# Patient Record
Sex: FEMALE | Race: WHITE | Employment: UNEMPLOYED | URBAN - NONMETROPOLITAN AREA
[De-identification: names, ages, dates, MRNs, and addresses within clinical notes are randomized per-mention and may not be internally consistent; named-entity substitution may affect disease eponyms.]

---

## 2019-01-08 ENCOUNTER — HOSPITAL ENCOUNTER (OUTPATIENT)
Age: 59
Discharge: HOME | End: 2019-01-08
Payer: MEDICARE

## 2019-01-08 LAB
ALBUMIN SERPL-MCNC: 4.2 G/DL (ref 3.5–5.1)
ALP BLD-CCNC: 75 U/L (ref 38–126)
ALT SERPL-CCNC: 24 U/L (ref 11–66)
ANION GAP SERPL CALCULATED.3IONS-SCNC: 12 MEQ/L (ref 8–16)
AST SERPL-CCNC: 37 U/L (ref 5–40)
BASOPHILS # BLD: 0.3 %
BASOPHILS ABSOLUTE: 0 THOU/MM3 (ref 0–0.1)
BILIRUB SERPL-MCNC: 0.4 MG/DL (ref 0.3–1.2)
BUN BLDV-MCNC: 11 MG/DL (ref 7–22)
CALCIUM SERPL-MCNC: 10.2 MG/DL (ref 8.5–10.5)
CHLORIDE BLD-SCNC: 99 MEQ/L (ref 98–111)
CHOLESTEROL, TOTAL: 200 MG/DL (ref 100–199)
CO2: 27 MEQ/L (ref 23–33)
CREAT SERPL-MCNC: 0.8 MG/DL (ref 0.4–1.2)
CREATININE, URINE: 233.2 MG/DL
EOSINOPHIL # BLD: 1.3 %
EOSINOPHILS ABSOLUTE: 0.1 THOU/MM3 (ref 0–0.4)
ERYTHROCYTE [DISTWIDTH] IN BLOOD BY AUTOMATED COUNT: 14.1 % (ref 11.5–14.5)
ERYTHROCYTE [DISTWIDTH] IN BLOOD BY AUTOMATED COUNT: 47.2 FL (ref 35–45)
GFR SERPL CREATININE-BSD FRML MDRD: 74 ML/MIN/1.73M2
GLUCOSE BLD-MCNC: 129 MG/DL (ref 70–108)
HCT VFR BLD CALC: 44.1 % (ref 37–47)
HDLC SERPL-MCNC: 34 MG/DL
HEMOGLOBIN: 13.7 GM/DL (ref 12–16)
IMMATURE GRANS (ABS): 0.07 THOU/MM3 (ref 0–0.07)
IMMATURE GRANULOCYTES: 0.7 %
LDL CHOLESTEROL CALCULATED: 109 MG/DL
LYMPHOCYTES # BLD: 30.8 %
LYMPHOCYTES ABSOLUTE: 3 THOU/MM3 (ref 1–4.8)
MCH RBC QN AUTO: 28.6 PG (ref 26–33)
MCHC RBC AUTO-ENTMCNC: 31.1 GM/DL (ref 32.2–35.5)
MCV RBC AUTO: 92.1 FL (ref 81–99)
MICROALBUMIN UR-MCNC: 54.1 MG/DL
MICROALBUMIN/CREAT UR-RTO: 232 MG/G (ref 0–30)
MONOCYTES # BLD: 4.7 %
MONOCYTES ABSOLUTE: 0.5 THOU/MM3 (ref 0.4–1.3)
NUCLEATED RED BLOOD CELLS: 0 /100 WBC
PLATELET # BLD: 336 THOU/MM3 (ref 130–400)
PMV BLD AUTO: 10.4 FL (ref 9.4–12.4)
POTASSIUM SERPL-SCNC: 4.8 MEQ/L (ref 3.5–5.2)
RBC # BLD: 4.79 MILL/MM3 (ref 4.2–5.4)
SEG NEUTROPHILS: 62.2 %
SEGMENTED NEUTROPHILS ABSOLUTE COUNT: 6.2 THOU/MM3 (ref 1.8–7.7)
SODIUM BLD-SCNC: 138 MEQ/L (ref 135–145)
TOTAL PROTEIN: 7.3 G/DL (ref 6.1–8)
TRIGL SERPL-MCNC: 287 MG/DL (ref 0–199)
TSH SERPL DL<=0.05 MIU/L-ACNC: 2.65 UIU/ML (ref 0.4–4.2)
WBC # BLD: 9.9 THOU/MM3 (ref 4.8–10.8)

## 2019-01-08 PROCEDURE — 85025 COMPLETE CBC W/AUTO DIFF WBC: CPT

## 2019-01-08 PROCEDURE — 36415 COLL VENOUS BLD VENIPUNCTURE: CPT

## 2019-01-08 PROCEDURE — 80061 LIPID PANEL: CPT

## 2019-01-08 PROCEDURE — 84443 ASSAY THYROID STIM HORMONE: CPT

## 2019-01-08 PROCEDURE — 80053 COMPREHEN METABOLIC PANEL: CPT

## 2019-01-08 PROCEDURE — 82043 UR ALBUMIN QUANTITATIVE: CPT

## 2019-08-02 ENCOUNTER — TELEPHONE (OUTPATIENT)
Dept: FAMILY MEDICINE CLINIC | Age: 59
End: 2019-08-02

## 2019-08-07 ENCOUNTER — TELEPHONE (OUTPATIENT)
Dept: FAMILY MEDICINE CLINIC | Age: 59
End: 2019-08-07

## 2019-08-07 NOTE — TELEPHONE ENCOUNTER
1. Appt time and date. (give directions)   Future Appointments   Date Time Provider Demi Farley   8/27/2019  2:20 PM Curtis Antoine, [address]         2. Arrive 15 min before appt. 3. Please bring all medications to appt. 4. Bring immunization record. (if no record, which immunizations have you had and where?)          Left detailed message on mach.

## 2019-08-25 NOTE — PROGRESS NOTES
due to type 2 diabetes mellitus ([facility]     Nicotine dependence     Umbilical hernia      as a complication  after cholecystectomy per pt      Urge urinary incontinence     Hypertension, essential        Past Medical History:   Diagnosis Date    Asthma, mild persistent     Depression with anxiety     DM2 (diabetes mellitus, type 2) (HCC)     GERD (gastroesophageal reflux disease)     Glaucoma     History of CVA (cerebrovascular accident)     x 2 per pt; last was ~2010    Hypertension, essential     Microalbuminuria due to type 2 diabetes mellitus ([facility]     Nicotine dependence     Umbilical hernia     as a complication  after cholecystectomy per pt    Urge urinary incontinence          Past Surgical History:   Procedure Laterality Date    CATARACT REMOVAL WITH IMPLANT Bilateral     CHOLECYSTECTOMY      COLONOSCOPY  2014, 2016    FOOT SURGERY Right     10/17/13, hardware placed and removed    TONSILLECTOMY      UPPER GASTROINTESTINAL ENDOSCOPY  2014, 2016         Allergies   Allergen Reactions    Latex          Social History     Tobacco Use    Smoking status: Current Every Day Smoker     Packs/day: 1.00     Years: 33.00     Pack years: 33.00    Smokeless tobacco: Never Used   Substance Use Topics    Alcohol use: No         Family History   Problem Relation Age of Onset    Diabetes Mother     Glaucoma Mother     Heart Attack Father     Diabetes Sister     Diabetes Brother     Colon Polyps Brother 52    Colon Polyps Sister 54    Colon Cancer Neg Hx     Breast Cancer Neg Hx          I have reviewed the patient's past medical history, past surgical history, allergies, medications, social and family history and I have made updates where appropriate.       Review of Systems  Positive responses are highlighted in bold    Constitutional:  Fever, Chills, Night Sweats, Fatigue, Unexpected changes in weight  Eyes:  Eye discharge, Eye pain, Eye redness, Visual disturbances   HENT:  Ear pain, Tinnitus, Nosebleeds, Trouble swallowing, Hearing loss, Sore throat  Cardiovascular:  Chest Pain, Palpitations, Orthopnea, Paroxysmal Nocturnal Dyspnea  Respiratory:  Cough, Wheezing, Shortness of breath, Chest tightness, Apnea  Gastrointestinal:  Nausea, Vomiting, Diarrhea, Constipation, Heartburn, Blood in stool  Genitourinary:  Difficulty or painful urination, Flank pain, Change in frequency, Urgency  Skin:  Color change, Rash, Itching, Wound  Psychiatric:  Hallucinations, Anxiety, Depression, Suicidal ideation  Hematological:  Enlarged glands, Easy bleeding, Easily bruising  Musculoskeletal:  Joint pain, Back pain, Gait problems, Joint swelling, Myalgias  Neurological:  Dizziness, Headaches, Presyncope, Numbness, Seizures, Tremors  Allergy:  Environmental allergies, Food allergies  Endocrine:  Heat Intolerance, Cold Intolerance, Polydipsia, Polyphagia, Polyuria      PHYSICAL EXAM:  Vitals:    08/27/19 1423   BP: 120/68   Pulse: 80   Resp: 20   Temp: 98.1 °F (36.7 °C)   TempSrc: Oral   Weight: 230 lb (104.3 kg)   Height: 5' 6\" (1.676 m)     Body mass index is 37.12 kg/m². Pain Score:   0 - No pain    VS Reviewed  General Appearance: A&O x 3, No acute distress,well developed and well- nourished  Head: normocephalic and atraumatic  Eyes: pupils equal, round, and reactive to light, extraocular eye movements intact, conjunctivae and eye lids without erythema  ENT: external ear and ear canal clear bilaterally but skin a bit dry, TMs intact and regular, nose without deformity, nasal mucosa and turbinates normal without polyps, oropharynx normal, dentition is normal for age  Neck: supple and non-tender without mass, no thyromegaly or thyroid nodules, no cervical lymphadenopathy  Pulmonary/Chest: clear to auscultation bilaterally- no wheezes, rales or rhonchi, normal air movement, no respiratory distress or retractions  Cardiovascular: S1 and S2 auscultated w/ RRR.  No murmurs, rubs, clicks, or gallops, distal pulses Attached    I have instructed Say Jeff to complete a self tracking handout on Blood Pressures  and Weights and instructed them to bring it with them to her next appointment. Barriers to learning and self management: none    Discussed use, benefit, and side effects of prescribed medications. Barriers to medication compliance addressed. All patient questions answered. Pt voiced understanding. Low Dose CT (LDCT) Lung Screening criteria met   Age 50-69   Pack year smoking >30   Still smoking or less than 15 year since quit   No sign or symptoms of lung cancer   > 11 months since last LDCT     Risks and benefits of lung cancer screening with LDCT scans discussed:    Significance of positive screen - False-positive LDCT results often occur. 95% of all positive results do not lead to a diagnosis of cancer. Usually further imaging can resolve most false-positive results; however, some patients may require invasive procedures. Over diagnosis risk - 10% to 12% of screen-detected lung cancer cases are over diagnosed--that is, the cancer would not have been detected in the patient's lifetime without the screening. Need for follow up screens annually to continue lung cancer screening effectiveness     Risks associated with radiation from annual LDCT- Radiation exposure is about the same as for a mammogram, which is about 1/3 of the annual background radiation exposure from everyday life. Starting screening at age 54 is not likely to increase cancer risk from radiation exposure. Patients with comorbidities resulting in life expectancy of < 10 years, or that would preclude treatment of an abnormality identified on CT, should not be screened due to lack of benefit.     To obtain maximal benefit from this screening, smoking cessation and long-term abstinence from smoking is critical      Electronically signed by Paloma Gusman DO on 8/27/2019 at 3:24 PM

## 2019-08-27 ENCOUNTER — TELEPHONE (OUTPATIENT)
Dept: FAMILY MEDICINE CLINIC | Age: 59
End: 2019-08-27

## 2019-08-27 ENCOUNTER — OFFICE VISIT (OUTPATIENT)
Dept: FAMILY MEDICINE CLINIC | Age: 59
End: 2019-08-27
Payer: MEDICARE

## 2019-08-27 VITALS
BODY MASS INDEX: 36.96 KG/M2 | WEIGHT: 230 LBS | HEART RATE: 80 BPM | TEMPERATURE: 98.1 F | HEIGHT: 66 IN | RESPIRATION RATE: 20 BRPM | DIASTOLIC BLOOD PRESSURE: 68 MMHG | SYSTOLIC BLOOD PRESSURE: 120 MMHG

## 2019-08-27 DIAGNOSIS — E11.29 TYPE 2 DIABETES MELLITUS WITH MICROALBUMINURIA, WITHOUT LONG-TERM CURRENT USE OF INSULIN (HCC): Primary | ICD-10-CM

## 2019-08-27 DIAGNOSIS — R80.9 TYPE 2 DIABETES MELLITUS WITH MICROALBUMINURIA, WITHOUT LONG-TERM CURRENT USE OF INSULIN (HCC): Primary | ICD-10-CM

## 2019-08-27 DIAGNOSIS — N39.41 URGE URINARY INCONTINENCE: ICD-10-CM

## 2019-08-27 DIAGNOSIS — F41.8 DEPRESSION WITH ANXIETY: ICD-10-CM

## 2019-08-27 DIAGNOSIS — E55.9 VITAMIN D DEFICIENCY: ICD-10-CM

## 2019-08-27 DIAGNOSIS — L29.9 ITCHING OF EAR: ICD-10-CM

## 2019-08-27 DIAGNOSIS — J45.30 MILD PERSISTENT ASTHMA WITHOUT COMPLICATION: ICD-10-CM

## 2019-08-27 DIAGNOSIS — F17.210 CIGARETTE NICOTINE DEPENDENCE WITHOUT COMPLICATION: ICD-10-CM

## 2019-08-27 DIAGNOSIS — K21.00 GASTROESOPHAGEAL REFLUX DISEASE WITH ESOPHAGITIS: ICD-10-CM

## 2019-08-27 DIAGNOSIS — Z12.31 ENCOUNTER FOR SCREENING MAMMOGRAM FOR MALIGNANT NEOPLASM OF BREAST: ICD-10-CM

## 2019-08-27 DIAGNOSIS — I10 HYPERTENSION, ESSENTIAL: ICD-10-CM

## 2019-08-27 DIAGNOSIS — Z86.73 HISTORY OF CVA (CEREBROVASCULAR ACCIDENT): ICD-10-CM

## 2019-08-27 LAB — HBA1C MFR BLD: 6.8 %

## 2019-08-27 PROCEDURE — 83036 HEMOGLOBIN GLYCOSYLATED A1C: CPT | Performed by: FAMILY MEDICINE

## 2019-08-27 PROCEDURE — G8417 CALC BMI ABV UP PARAM F/U: HCPCS | Performed by: FAMILY MEDICINE

## 2019-08-27 PROCEDURE — G8427 DOCREV CUR MEDS BY ELIG CLIN: HCPCS | Performed by: FAMILY MEDICINE

## 2019-08-27 PROCEDURE — 3044F HG A1C LEVEL LT 7.0%: CPT | Performed by: FAMILY MEDICINE

## 2019-08-27 PROCEDURE — G0296 VISIT TO DETERM LDCT ELIG: HCPCS | Performed by: FAMILY MEDICINE

## 2019-08-27 PROCEDURE — 4004F PT TOBACCO SCREEN RCVD TLK: CPT | Performed by: FAMILY MEDICINE

## 2019-08-27 PROCEDURE — 3017F COLORECTAL CA SCREEN DOC REV: CPT | Performed by: FAMILY MEDICINE

## 2019-08-27 PROCEDURE — 2022F DILAT RTA XM EVC RTNOPTHY: CPT | Performed by: FAMILY MEDICINE

## 2019-08-27 PROCEDURE — 99204 OFFICE O/P NEW MOD 45 MIN: CPT | Performed by: FAMILY MEDICINE

## 2019-08-27 RX ORDER — ALBUTEROL SULFATE 90 UG/1
2 AEROSOL, METERED RESPIRATORY (INHALATION) EVERY 6 HOURS PRN
COMMUNITY
End: 2021-10-25

## 2019-08-27 RX ORDER — ALBUTEROL SULFATE 90 UG/1
2 AEROSOL, METERED RESPIRATORY (INHALATION) EVERY 4 HOURS PRN
Qty: 2 INHALER | Refills: 3 | Status: SHIPPED | OUTPATIENT
Start: 2019-08-27 | End: 2021-06-02 | Stop reason: SDUPTHER

## 2019-08-27 RX ORDER — ATENOLOL 25 MG/1
25 TABLET ORAL DAILY
Qty: 90 TABLET | Refills: 3 | Status: SHIPPED | OUTPATIENT
Start: 2019-08-27 | End: 2020-08-27

## 2019-08-27 RX ORDER — BUSPIRONE HYDROCHLORIDE 10 MG/1
10 TABLET ORAL
COMMUNITY
End: 2020-03-23 | Stop reason: SDUPTHER

## 2019-08-27 RX ORDER — AMLODIPINE BESYLATE AND BENAZEPRIL HYDROCHLORIDE 10; 20 MG/1; MG/1
1 CAPSULE ORAL DAILY
Qty: 90 CAPSULE | Refills: 3 | Status: SHIPPED | OUTPATIENT
Start: 2019-08-27 | End: 2020-05-29

## 2019-08-27 RX ORDER — ATENOLOL 25 MG/1
25 TABLET ORAL DAILY
COMMUNITY
End: 2019-08-27 | Stop reason: SDUPTHER

## 2019-08-27 ASSESSMENT — PATIENT HEALTH QUESTIONNAIRE - PHQ9
SUM OF ALL RESPONSES TO PHQ9 QUESTIONS 1 & 2: 2
1. LITTLE INTEREST OR PLEASURE IN DOING THINGS: 1
2. FEELING DOWN, DEPRESSED OR HOPELESS: 1
SUM OF ALL RESPONSES TO PHQ QUESTIONS 1-9: 2
SUM OF ALL RESPONSES TO PHQ QUESTIONS 1-9: 2

## 2019-08-27 NOTE — PATIENT INSTRUCTIONS
Medicare and most other insurers, strict criteria must be met. If you do not meet these criteria, but still wish to undergo LDCT testing, you will be required to sign a waiver indicating your willingness to pay for the scan. Patient Education        Type 2 Diabetes: Care Instructions  Your Care Instructions    Type 2 diabetes is a disease that develops when the body's tissues cannot use insulin properly. Over time, the pancreas cannot make enough insulin. Insulin is a hormone that helps the body's cells use sugar (glucose) for energy. It also helps the body store extra sugar in muscle, fat, and liver cells. Without insulin, the sugar cannot get into the cells to do its work. It stays in the blood instead. This can cause high blood sugar levels. A person has diabetes when the blood sugar stays too high too much of the time. Over time, diabetes can lead to diseases of the heart, blood vessels, nerves, kidneys, and eyes. You may be able to control your blood sugar by losing weight, eating a healthy diet, and getting daily exercise. You may also have to take insulin or other diabetes medicine. Follow-up care is a key part of your treatment and safety. Be sure to make and go to all appointments. Call your doctor if you are having problems. It's also a good idea to know your test results and keep a list of the medicines you take. How can you care for yourself at home? · Keep your blood sugar at a target level (which you set with your doctor). ? Eat a good diet that spreads carbohydrate throughout the day. Carbohydrate--the body's main source of fuel--affects blood sugar more than any other nutrient. Carbohydrate is in fruits, vegetables, milk, and yogurt. It also is in breads, cereals, vegetables such as potatoes and corn, and sugary foods such as candy and cakes. ? Aim for 30 minutes of exercise on most, preferably all, days of the week. Walking is a good choice.  You also may want to do other activities, such as running, swimming, cycling, or playing tennis or team sports. If your doctor says it's okay, do muscle-strengthening exercises at least 2 times a week. ? Take your medicines exactly as prescribed. Call your doctor if you think you are having a problem with your medicine. You will get more details on the specific medicines your doctor prescribes. · Check your blood sugar as often as your doctor recommends. It is important to keep track of any symptoms you have, such as low blood sugar. Also tell your doctor if you have any changes in your activities, diet, or insulin use. · Talk to your doctor before you start taking aspirin every day. Aspirin can help certain people lower their risk of a heart attack or stroke. But taking aspirin isn't right for everyone, because it can cause serious bleeding. · Do not smoke. If you need help quitting, talk to your doctor about stop-smoking programs and medicines. These can increase your chances of quitting for good. · Keep your cholesterol and blood pressure at normal levels. You may need to take one or more medicines to reach your goals. Take them exactly as directed. Do not stop or change a medicine without talking to your doctor first.  When should you call for help? Call 911 anytime you think you may need emergency care. For example, call if:    · You passed out (lost consciousness), or you suddenly become very sleepy or confused. (You may have very low blood sugar.)    Call your doctor now or seek immediate medical care if:    · Your blood sugar is 300 mg/dL or is higher than the level your doctor has set for you.     · You have symptoms of low blood sugar, such as:  ? Sweating. ? Feeling nervous, shaky, and weak. ? Extreme hunger and slight nausea. ? Dizziness and headache.  ? Blurred vision. ?  Confusion.    Watch closely for changes in your health, and be sure to contact your doctor if:    · You often have problems controlling your blood sugar.     · You have

## 2019-09-12 ENCOUNTER — HOSPITAL ENCOUNTER (OUTPATIENT)
Dept: PULMONOLOGY | Age: 59
Discharge: HOME | End: 2019-09-12
Payer: MEDICARE

## 2019-09-12 DIAGNOSIS — J45.30 MILD PERSISTENT ASTHMA WITHOUT COMPLICATION: ICD-10-CM

## 2019-09-12 PROCEDURE — 94060 EVALUATION OF WHEEZING: CPT

## 2019-09-12 PROCEDURE — 94726 PLETHYSMOGRAPHY LUNG VOLUMES: CPT

## 2019-09-12 PROCEDURE — 94729 DIFFUSING CAPACITY: CPT

## 2019-09-18 ENCOUNTER — TELEPHONE (OUTPATIENT)
Dept: FAMILY MEDICINE CLINIC | Age: 59
End: 2019-09-18

## 2019-09-18 NOTE — TELEPHONE ENCOUNTER
Patient has been informed and understands but would like to know what she can take for sneezing   Please advise

## 2019-10-07 ENCOUNTER — TELEPHONE (OUTPATIENT)
Dept: FAMILY MEDICINE CLINIC | Age: 59
End: 2019-10-07

## 2019-10-13 ENCOUNTER — APPOINTMENT (OUTPATIENT)
Dept: CT IMAGING | Age: 59
End: 2019-10-13
Payer: MEDICARE

## 2019-10-13 ENCOUNTER — HOSPITAL ENCOUNTER (EMERGENCY)
Age: 59
Discharge: HOME | End: 2019-10-14
Attending: EMERGENCY MEDICINE
Payer: MEDICARE

## 2019-10-13 ENCOUNTER — APPOINTMENT (OUTPATIENT)
Dept: GENERAL RADIOLOGY | Age: 59
End: 2019-10-13
Payer: MEDICARE

## 2019-10-13 VITALS
SYSTOLIC BLOOD PRESSURE: 167 MMHG | BODY MASS INDEX: 37.12 KG/M2 | TEMPERATURE: 98 F | HEIGHT: 66 IN | WEIGHT: 231 LBS | OXYGEN SATURATION: 96 % | HEART RATE: 92 BPM | RESPIRATION RATE: 18 BRPM | DIASTOLIC BLOOD PRESSURE: 89 MMHG

## 2019-10-13 DIAGNOSIS — J45.901 MILD ASTHMA WITH ACUTE EXACERBATION, UNSPECIFIED WHETHER PERSISTENT: ICD-10-CM

## 2019-10-13 DIAGNOSIS — S09.90XA CLOSED HEAD INJURY, INITIAL ENCOUNTER: ICD-10-CM
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
## 2019-10-13 LAB
ALBUMIN SERPL-MCNC: 4 G/DL (ref 3.5–5.1)
ALP BLD-CCNC: 69 U/L (ref 38–126)
ALT SERPL-CCNC: 12 U/L (ref 11–66)
AMPHETAMINE+METHAMPHETAMINE URINE SCREEN: NEGATIVE
ANION GAP SERPL CALCULATED.3IONS-SCNC: 13 MEQ/L (ref 8–16)
AST SERPL-CCNC: 18 U/L (ref 5–40)
BACTERIA: ABNORMAL /HPF
BARBITURATE QUANTITATIVE URINE: NEGATIVE
BASOPHILS # BLD: 0.3 %
BASOPHILS ABSOLUTE: 0 THOU/MM3 (ref 0–0.1)
BENZODIAZEPINE QUANTITATIVE URINE: NEGATIVE
BILIRUB SERPL-MCNC: 0.2 MG/DL (ref 0.3–1.2)
BILIRUBIN URINE: ABNORMAL
BLOOD, URINE: NEGATIVE
BUN BLDV-MCNC: 14 MG/DL (ref 7–22)
CALCIUM SERPL-MCNC: 9.9 MG/DL (ref 8.5–10.5)
CANNABINOID QUANTITATIVE URINE: NEGATIVE
CASTS 2: ABNORMAL /LPF
CASTS UA: ABNORMAL /LPF
CHARACTER, URINE: ABNORMAL
CHLORIDE BLD-SCNC: 97 MEQ/L (ref 98–111)
CO2: 27 MEQ/L (ref 23–33)
COCAINE METABOLITE QUANTITATIVE URINE: NEGATIVE
COLOR: YELLOW
CREAT SERPL-MCNC: 0.7 MG/DL (ref 0.4–1.2)
CRYSTALS, UA: ABNORMAL
EKG ATRIAL RATE: 86 BPM
EKG P AXIS: 60 DEGREES
EKG P-R INTERVAL: 176 MS
EKG Q-T INTERVAL: 386 MS
EKG QRS DURATION: 78 MS
EKG QTC CALCULATION (BAZETT): 461 MS
EKG R AXIS: 55 DEGREES
EKG T AXIS: 81 DEGREES
EKG VENTRICULAR RATE: 86 BPM
EOSINOPHIL # BLD: 1.4 %
EOSINOPHILS ABSOLUTE: 0.1 THOU/MM3 (ref 0–0.4)
EPITHELIAL CELLS, UA: ABNORMAL /HPF
ERYTHROCYTE [DISTWIDTH] IN BLOOD BY AUTOMATED COUNT: 14.4 % (ref 11.5–14.5)
ERYTHROCYTE [DISTWIDTH] IN BLOOD BY AUTOMATED COUNT: 46.9 FL (ref 35–45)
ETHYL ALCOHOL, SERUM: < 0.01 %
GFR SERPL CREATININE-BSD FRML MDRD: 86 ML/MIN/1.73M2
GLUCOSE BLD-MCNC: 232 MG/DL (ref 70–108)
GLUCOSE URINE: NEGATIVE MG/DL
HCT VFR BLD CALC: 40.3 % (ref 37–47)
HEMOGLOBIN: 12.9 GM/DL (ref 12–16)
ICTOTEST: NEGATIVE
IMMATURE GRANS (ABS): 0.04 THOU/MM3 (ref 0–0.07)
IMMATURE GRANULOCYTES: 0.4 %
KETONES, URINE: NEGATIVE
LEUKOCYTE ESTERASE, URINE: NEGATIVE
LYMPHOCYTES # BLD: 24.3 %
LYMPHOCYTES ABSOLUTE: 2.6 THOU/MM3 (ref 1–4.8)
MCH RBC QN AUTO: 28.9 PG (ref 26–33)
MCHC RBC AUTO-ENTMCNC: 32 GM/DL (ref 32.2–35.5)
MCV RBC AUTO: 90.2 FL (ref 81–99)
MISCELLANEOUS 2: ABNORMAL
MONOCYTES # BLD: 4.4 %
MONOCYTES ABSOLUTE: 0.5 THOU/MM3 (ref 0.4–1.3)
NITRITE, URINE: NEGATIVE
NUCLEATED RED BLOOD CELLS: 0 /100 WBC
OPIATES, URINE: NEGATIVE
OSMOLALITY CALCULATION: 281.7 MOSMOL/KG (ref 275–300)
OXYCODONE: NEGATIVE
PH UA: 5 (ref 5–9)
PHENCYCLIDINE QUANTITATIVE URINE: NEGATIVE
PLATELET # BLD: 271 THOU/MM3 (ref 130–400)
PMV BLD AUTO: 10.5 FL (ref 9.4–12.4)
POTASSIUM SERPL-SCNC: 3.9 MEQ/L (ref 3.5–5.2)
PROTEIN UA: NEGATIVE
RBC # BLD: 4.47 MILL/MM3 (ref 4.2–5.4)
RBC URINE: ABNORMAL /HPF
REASON FOR REJECTION: NORMAL
REJECTED TEST: NORMAL
RENAL EPITHELIAL, UA: ABNORMAL
SEG NEUTROPHILS: 69.2 %
SEGMENTED NEUTROPHILS ABSOLUTE COUNT: 7.3 THOU/MM3 (ref 1.8–7.7)
SODIUM BLD-SCNC: 137 MEQ/L (ref 135–145)
SPECIFIC GRAVITY, URINE: 1.03 (ref 1–1.03)
TOTAL CK: 129 U/L (ref 30–135)
TOTAL PROTEIN: 6.8 G/DL (ref 6.1–8)
TROPONIN T: < 0.01 NG/ML
UROBILINOGEN, URINE: 1 EU/DL (ref 0–1)
WBC # BLD: 10.6 THOU/MM3 (ref 4.8–10.8)
WBC UA: ABNORMAL /HPF
YEAST: ABNORMAL

## 2019-10-13 PROCEDURE — 81001 URINALYSIS AUTO W/SCOPE: CPT

## 2019-10-13 PROCEDURE — G0480 DRUG TEST DEF 1-7 CLASSES: HCPCS

## 2019-10-13 PROCEDURE — 93010 ELECTROCARDIOGRAM REPORT: CPT | Performed by: NUCLEAR MEDICINE

## 2019-10-13 PROCEDURE — 94760 N-INVAS EAR/PLS OXIMETRY 1: CPT

## 2019-10-13 PROCEDURE — 94640 AIRWAY INHALATION TREATMENT: CPT

## 2019-10-13 PROCEDURE — 82550 ASSAY OF CK (CPK): CPT

## 2019-10-13 PROCEDURE — 70486 CT MAXILLOFACIAL W/O DYE: CPT

## 2019-10-13 PROCEDURE — 6370000000 HC RX 637 (ALT 250 FOR IP): Performed by: EMERGENCY MEDICINE

## 2019-10-13 PROCEDURE — 36415 COLL VENOUS BLD VENIPUNCTURE: CPT

## 2019-10-13 PROCEDURE — 72125 CT NECK SPINE W/O DYE: CPT

## 2019-10-13 PROCEDURE — 73110 X-RAY EXAM OF WRIST: CPT

## 2019-10-13 PROCEDURE — 80307 DRUG TEST PRSMV CHEM ANLYZR: CPT

## 2019-10-13 PROCEDURE — 85025 COMPLETE CBC W/AUTO DIFF WBC: CPT

## 2019-10-13 PROCEDURE — 99285 EMERGENCY DEPT VISIT HI MDM: CPT

## 2019-10-13 PROCEDURE — 80053 COMPREHEN METABOLIC PANEL: CPT

## 2019-10-13 PROCEDURE — 93005 ELECTROCARDIOGRAM TRACING: CPT | Performed by: EMERGENCY MEDICINE

## 2019-10-13 PROCEDURE — 87086 URINE CULTURE/COLONY COUNT: CPT

## 2019-10-13 PROCEDURE — 84484 ASSAY OF TROPONIN QUANT: CPT

## 2019-10-13 PROCEDURE — 70450 CT HEAD/BRAIN W/O DYE: CPT

## 2019-10-13 RX ORDER — PREDNISONE 20 MG/1
40 TABLET ORAL ONCE
Status: COMPLETED | OUTPATIENT
Start: 2019-10-13 | End: 2019-10-13

## 2019-10-13 RX ORDER — ACETAMINOPHEN 500 MG
1000 TABLET ORAL EVERY 6 HOURS PRN
Status: DISCONTINUED | OUTPATIENT
Start: 2019-10-13 | End: 2019-10-14 | Stop reason: HOSPADM

## 2019-10-13 RX ORDER — IPRATROPIUM BROMIDE AND ALBUTEROL SULFATE 2.5; .5 MG/3ML; MG/3ML
1 SOLUTION RESPIRATORY (INHALATION)
Status: COMPLETED | OUTPATIENT
Start: 2019-10-13 | End: 2019-10-13

## 2019-10-13 RX ADMIN — PREDNISONE 40 MG: 20 TABLET ORAL at 22:29

## 2019-10-13 RX ADMIN — IPRATROPIUM BROMIDE AND ALBUTEROL SULFATE 1 AMPULE: .5; 3 SOLUTION RESPIRATORY (INHALATION) at 19:58

## 2019-10-13 RX ADMIN — IPRATROPIUM BROMIDE AND ALBUTEROL SULFATE 1 AMPULE: .5; 3 SOLUTION RESPIRATORY (INHALATION) at 19:32

## 2019-10-13 RX ADMIN — ACETAMINOPHEN 1000 MG: 500 TABLET ORAL at 22:29

## 2019-10-13 RX ADMIN — IPRATROPIUM BROMIDE AND ALBUTEROL SULFATE 1 AMPULE: .5; 3 SOLUTION RESPIRATORY (INHALATION) at 19:49

## 2019-10-13 ASSESSMENT — PAIN DESCRIPTION - PAIN TYPE: TYPE: ACUTE PAIN

## 2019-10-13 ASSESSMENT — ENCOUNTER SYMPTOMS
CHEST TIGHTNESS: 0
COUGH: 0
SHORTNESS OF BREATH: 0
RECTAL PAIN: 0
BACK PAIN: 0
DIARRHEA: 0
NAUSEA: 0
BLOOD IN STOOL: 0
SINUS PRESSURE: 0
CONSTIPATION: 0
EYE PAIN: 0
ABDOMINAL PAIN: 0
SINUS PAIN: 0

## 2019-10-13 ASSESSMENT — PAIN SCALES - GENERAL
PAINLEVEL_OUTOF10: 5
PAINLEVEL_OUTOF10: 2
PAINLEVEL_OUTOF10: 5

## 2019-10-13 ASSESSMENT — PAIN DESCRIPTION - LOCATION: LOCATION: GENERALIZED

## 2019-10-13 ASSESSMENT — PAIN DESCRIPTION - FREQUENCY: FREQUENCY: CONTINUOUS

## 2019-10-13 ASSESSMENT — PAIN DESCRIPTION - DESCRIPTORS: DESCRIPTORS: ACHING

## 2019-10-14 LAB
ORGANISM: ABNORMAL
URINE CULTURE REFLEX: ABNORMAL

## 2019-10-14 RX ORDER — PREDNISONE 20 MG/1
20 TABLET ORAL 2 TIMES DAILY
Qty: 10 TABLET | Refills: 0 | Status: SHIPPED | OUTPATIENT
Start: 2019-10-14 | End: 2019-10-17

## 2019-10-16 ENCOUNTER — TELEPHONE (OUTPATIENT)
Dept: FAMILY MEDICINE CLINIC | Age: 59
End: 2019-10-16

## 2019-10-17 ENCOUNTER — OFFICE VISIT (OUTPATIENT)
Dept: FAMILY MEDICINE CLINIC | Age: 59
End: 2019-10-17
Payer: MEDICARE

## 2019-10-17 VITALS
BODY MASS INDEX: 38.67 KG/M2 | TEMPERATURE: 98.2 F | SYSTOLIC BLOOD PRESSURE: 136 MMHG | HEIGHT: 66 IN | OXYGEN SATURATION: 93 % | DIASTOLIC BLOOD PRESSURE: 74 MMHG | RESPIRATION RATE: 12 BRPM | WEIGHT: 240.6 LBS | HEART RATE: 80 BPM

## 2019-10-17 DIAGNOSIS — J01.90 ACUTE RHINOSINUSITIS: ICD-10-CM
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
## 2019-10-17 DIAGNOSIS — S02.2XXA CLOSED FRACTURE OF NASAL BONE, INITIAL ENCOUNTER: Primary | ICD-10-CM

## 2019-10-17 DIAGNOSIS — N39.41 URGE URINARY INCONTINENCE: Primary | Chronic | ICD-10-CM

## 2019-10-17 DIAGNOSIS — R31.29 MICROSCOPIC HEMATURIA: ICD-10-CM

## 2019-10-17 DIAGNOSIS — F17.210 CIGARETTE NICOTINE DEPENDENCE WITHOUT COMPLICATION: Chronic | ICD-10-CM
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
## 2019-10-17 DIAGNOSIS — J45.31 MILD PERSISTENT ASTHMA WITH ACUTE EXACERBATION: Chronic | ICD-10-CM

## 2019-10-17 LAB
BACTERIA: ABNORMAL
BILIRUBIN URINE: NEGATIVE
BILIRUBIN, POC: NORMAL
BLOOD URINE, POC: NORMAL
BLOOD, URINE: ABNORMAL
CASTS: ABNORMAL /LPF
CASTS: ABNORMAL /LPF
CHARACTER, URINE: ABNORMAL
CLARITY, POC: CLEAR
COLOR, POC: YELLOW
COLOR: YELLOW
CRYSTALS: ABNORMAL
EPITHELIAL CELLS, UA: ABNORMAL /HPF
GLUCOSE URINE, POC: NORMAL
GLUCOSE, URINE: NEGATIVE MG/DL
KETONES, POC: NORMAL
KETONES, URINE: NEGATIVE
LEUKOCYTE EST, POC: NORMAL
LEUKOCYTE ESTERASE, URINE: ABNORMAL
MISCELLANEOUS LAB TEST RESULT: ABNORMAL
NITRITE, POC: NORMAL
NITRITE, URINE: NEGATIVE
PH UA: 5 (ref 5–9)
PH, POC: 5.5
PROTEIN UA: ABNORMAL MG/DL
PROTEIN, POC: NORMAL
RBC URINE: ABNORMAL /HPF
RENAL EPITHELIAL, UA: ABNORMAL
SPECIFIC GRAVITY UA: 1.02 (ref 1–1.03)
SPECIFIC GRAVITY, POC: >=1.03
UROBILINOGEN, POC: 0.2
UROBILINOGEN, URINE: 1 EU/DL (ref 0–1)
WBC UA: > 200 /HPF
YEAST: ABNORMAL

## 2019-10-17 PROCEDURE — G8427 DOCREV CUR MEDS BY ELIG CLIN: HCPCS | Performed by: FAMILY MEDICINE

## 2019-10-17 PROCEDURE — 99214 OFFICE O/P EST MOD 30 MIN: CPT | Performed by: FAMILY MEDICINE

## 2019-10-17 PROCEDURE — G8484 FLU IMMUNIZE NO ADMIN: HCPCS | Performed by: FAMILY MEDICINE

## 2019-10-17 PROCEDURE — 4004F PT TOBACCO SCREEN RCVD TLK: CPT | Performed by: FAMILY MEDICINE

## 2019-10-17 PROCEDURE — G8417 CALC BMI ABV UP PARAM F/U: HCPCS | Performed by: FAMILY MEDICINE

## 2019-10-17 PROCEDURE — 81002 URINALYSIS NONAUTO W/O SCOPE: CPT | Performed by: FAMILY MEDICINE

## 2019-10-17 PROCEDURE — 3017F COLORECTAL CA SCREEN DOC REV: CPT | Performed by: FAMILY MEDICINE

## 2019-10-17 RX ORDER — ALBUTEROL SULFATE 1.25 MG/3ML
1 SOLUTION RESPIRATORY (INHALATION) EVERY 6 HOURS PRN
Qty: 360 ML | Refills: 3 | Status: CANCELLED | OUTPATIENT
Start: 2019-10-17

## 2019-10-17 RX ORDER — OXYBUTYNIN CHLORIDE 10 MG/1
10 TABLET, EXTENDED RELEASE ORAL DAILY
Qty: 90 TABLET | Refills: 3 | Status: SHIPPED | OUTPATIENT
Start: 2019-10-17 | End: 2020-08-27

## 2019-10-17 RX ORDER — VARENICLINE TARTRATE 25 MG
KIT ORAL
Qty: 1 BOX | Refills: 0 | Status: SHIPPED | OUTPATIENT
Start: 2019-10-17 | End: 2020-04-30

## 2019-10-17 RX ORDER — DOXYCYCLINE HYCLATE 100 MG/1
100 CAPSULE ORAL 2 TIMES DAILY
Qty: 20 CAPSULE | Refills: 0 | Status: SHIPPED | OUTPATIENT
Start: 2019-10-17 | End: 2019-10-27

## 2019-10-17 RX ORDER — PREDNISONE 10 MG/1
TABLET ORAL
Qty: 30 TABLET | Refills: 0 | Status: SHIPPED | OUTPATIENT
Start: 2019-10-17 | End: 2019-11-02

## 2019-10-17 RX ORDER — VARENICLINE TARTRATE 1 MG/1
1 TABLET, FILM COATED ORAL 2 TIMES DAILY
Qty: 60 TABLET | Refills: 1 | Status: SHIPPED | OUTPATIENT
Start: 2019-10-17 | End: 2020-04-30

## 2019-10-18 ENCOUNTER — HOSPITAL ENCOUNTER (OUTPATIENT)
Age: 59
Discharge: HOME | End: 2019-10-18
Payer: MEDICARE

## 2019-10-18 ENCOUNTER — HOSPITAL ENCOUNTER (OUTPATIENT)
Dept: GENERAL RADIOLOGY | Age: 59
Discharge: HOME | End: 2019-10-18
Payer: MEDICARE

## 2019-10-18 DIAGNOSIS — J45.31 MILD PERSISTENT ASTHMA WITH ACUTE EXACERBATION: Chronic | ICD-10-CM

## 2019-10-18 LAB
ORGANISM: ABNORMAL
URINE CULTURE, ROUTINE: ABNORMAL

## 2019-10-18 PROCEDURE — 71046 X-RAY EXAM CHEST 2 VIEWS: CPT

## 2019-10-21 ENCOUNTER — TELEPHONE (OUTPATIENT)
Dept: FAMILY MEDICINE CLINIC | Age: 59
End: 2019-10-21

## 2019-10-23 ENCOUNTER — TELEPHONE (OUTPATIENT)
Dept: FAMILY MEDICINE CLINIC | Age: 59
End: 2019-10-23

## 2019-10-24 ENCOUNTER — OFFICE VISIT (OUTPATIENT)
Dept: FAMILY MEDICINE CLINIC | Age: 59
End: 2019-10-24
Payer: MEDICARE

## 2019-10-24 VITALS
SYSTOLIC BLOOD PRESSURE: 136 MMHG | RESPIRATION RATE: 16 BRPM | HEART RATE: 70 BPM | DIASTOLIC BLOOD PRESSURE: 62 MMHG | HEIGHT: 69 IN | OXYGEN SATURATION: 95 % | TEMPERATURE: 98.3 F | WEIGHT: 239 LBS | BODY MASS INDEX: 35.4 KG/M2

## 2019-10-24 DIAGNOSIS — J01.90 ACUTE RHINOSINUSITIS: ICD-10-CM

## 2019-10-24 DIAGNOSIS — R31.29 MICROSCOPIC HEMATURIA: ICD-10-CM

## 2019-10-24 DIAGNOSIS — J45.30 MILD PERSISTENT ASTHMA WITHOUT COMPLICATION: Primary | ICD-10-CM

## 2019-10-24 DIAGNOSIS — F17.210 CIGARETTE NICOTINE DEPENDENCE WITHOUT COMPLICATION: ICD-10-CM

## 2019-10-24 PROCEDURE — G0008 ADMIN INFLUENZA VIRUS VAC: HCPCS | Performed by: FAMILY MEDICINE

## 2019-10-24 PROCEDURE — 4004F PT TOBACCO SCREEN RCVD TLK: CPT | Performed by: FAMILY MEDICINE

## 2019-10-24 PROCEDURE — 90686 IIV4 VACC NO PRSV 0.5 ML IM: CPT | Performed by: FAMILY MEDICINE

## 2019-10-24 PROCEDURE — 3017F COLORECTAL CA SCREEN DOC REV: CPT | Performed by: FAMILY MEDICINE

## 2019-10-24 PROCEDURE — G8417 CALC BMI ABV UP PARAM F/U: HCPCS | Performed by: FAMILY MEDICINE

## 2019-10-24 PROCEDURE — 99214 OFFICE O/P EST MOD 30 MIN: CPT | Performed by: FAMILY MEDICINE

## 2019-10-24 PROCEDURE — G8482 FLU IMMUNIZE ORDER/ADMIN: HCPCS | Performed by: FAMILY MEDICINE

## 2019-10-24 PROCEDURE — G8427 DOCREV CUR MEDS BY ELIG CLIN: HCPCS | Performed by: FAMILY MEDICINE

## 2019-11-14 ENCOUNTER — TELEPHONE (OUTPATIENT)
Dept: FAMILY MEDICINE CLINIC | Age: 59
End: 2019-11-14

## 2019-11-26 ENCOUNTER — TELEPHONE (OUTPATIENT)
Dept: FAMILY MEDICINE CLINIC | Age: 59
End: 2019-11-26

## 2019-12-17 ENCOUNTER — TELEPHONE (OUTPATIENT)
Dept: FAMILY MEDICINE CLINIC | Age: 59
End: 2019-12-17

## 2020-01-17 ENCOUNTER — TELEPHONE (OUTPATIENT)
Dept: FAMILY MEDICINE CLINIC | Age: 60
End: 2020-01-17

## 2020-01-17 NOTE — TELEPHONE ENCOUNTER
2nd attempt to contact the pt re:overdue labs & CT Dr Sergio Davidson ordered on 8/27/19. HIPAA form is up to date, orders mailed.

## 2020-03-23 RX ORDER — BUSPIRONE HYDROCHLORIDE 10 MG/1
TABLET ORAL
Qty: 270 TABLET | Refills: 3 | Status: SHIPPED | OUTPATIENT
Start: 2020-03-23 | End: 2020-11-27

## 2020-03-23 NOTE — TELEPHONE ENCOUNTER
Tyshawn Rooney called requesting a refill on the following medications:  Requested Prescriptions     Pending Prescriptions Disp Refills    busPIRone (BUSPAR) 10 MG tablet       Sig: Take 1 tablet by mouth 2 tab in am and 1 q     Pharmacy verified:  [facility]      Date of last visit: 10/24/19  Date of next visit (if applicable): 8/8/3992

## 2020-04-16 ENCOUNTER — TELEPHONE (OUTPATIENT)
Dept: FAMILY MEDICINE CLINIC | Age: 60
End: 2020-04-16

## 2020-04-30 ENCOUNTER — VIRTUAL VISIT (OUTPATIENT)
Dept: FAMILY MEDICINE CLINIC | Age: 60
End: 2020-04-30
Payer: MEDICARE

## 2020-04-30 PROCEDURE — 99442 PR PHYS/QHP TELEPHONE EVALUATION 11-20 MIN: CPT | Performed by: FAMILY MEDICINE

## 2020-04-30 RX ORDER — HYDROXYZINE HYDROCHLORIDE 25 MG/1
25 TABLET, FILM COATED ORAL NIGHTLY PRN
Qty: 30 TABLET | Refills: 2 | Status: SHIPPED | OUTPATIENT
Start: 2020-04-30 | End: 2020-09-24 | Stop reason: SDUPTHER

## 2020-04-30 NOTE — PROGRESS NOTES
10/17/19 136/74   10/13/19 (!) 167/89       -GERD:    HPI:    Taking meds as prescribed ?: yes  Tolerating well ?: yes  Side Effects ?: denies  Dysphagia ?: denies  Odynophagia ?: denies  Melena ?: denies  Working on TLCS ?: yes      -Hx of CVA x 2:  Last was 2010  No residual sxs per pt  No weakness  On full dose asa and plavix  Pt not sure why she is on DAPT  Not on statin, pt not sure why note      -Urge urinary incontinence: On ditropan  Works well  Keep sxs well controlled      -Vit d def: On supplementation  Tolerating well        -Depression/anxiety:  On zoloft and buspar  Working well  Moods stable  Denies SI/HI  Trouble sleeping the last few wks. Thinks d/t lock down.       -Smoking PRIOR VISIT:  Wants to quit  Would like to try chantix    UPDATE LAST VISIT:   Has chantix  Hasn't started yet  Hasn't picked quit date  Encouraged to do so     UPDATE TODAY:   1/2 PPD yet  Tried chantix, but didn't help  Not ready to quit.         Age/Gender Health Maintenance    Lipid -   Lab Results   Component Value Date    CHOL 200 (H) 01/08/2019    CHOL 207 (H) 09/21/2016    CHOL 171 10/06/2015     Lab Results   Component Value Date    TRIG 287 (H) 01/08/2019    TRIG 178 09/21/2016    TRIG 176 10/06/2015     Lab Results   Component Value Date    HDL 34 01/08/2019    HDL 40 09/21/2016    HDL 42 10/06/2015     Lab Results   Component Value Date    LDLCALC 109 01/08/2019    [address] 131 09/21/2016    LDLCALC 94 10/06/2015     No results found for: LABVLDL, VLDL  No results found for: CHOLHDLRATIO      TSH -   Lab Results   Component Value Date    TSH 2.650 01/08/2019       Colon Cancer Screening - + multiple polyps DEC 2016, repeat 3 years per GI, Jennifer  Lung Cancer Screening (Age 54 to [de-identified] with 30 pack year hx, current smoker or quit within past 15 years) - due, ordered    Tetanus - to get at pharmacy per medicare rules  Influenza Vaccine - UTD FALL 2019  Pneumonia Vaccine - UTD NOV 2016, PPV 23  Zoster - to get at  aspirin 325 MG tablet Take 325 mg by mouth daily.  clopidogrel (PLAVIX) 75 MG tablet Take 75 mg by mouth daily. No current facility-administered medications for this visit. Orders Placed This Encounter   Medications    hydrOXYzine (ATARAX) 25 MG tablet     Sig: Take 1 tablet by mouth nightly as needed for Anxiety     Dispense:  30 tablet     Refill:  2         All medications reviewed and reconciled, including OTC and herbal medications. Updated list given to patient.        Patient Active Problem List    Diagnosis Date Noted    Asthma, mild persistent     Depression with anxiety     DM2 (diabetes mellitus, type 2) ([address]     GERD (gastroesophageal reflux disease)     Glaucoma     History of CVA (cerebrovascular accident)      x 2 per pt; last was ~2010      Microalbuminuria due to type 2 diabetes mellitus ([address]     Nicotine dependence     Umbilical hernia      as a complication  after cholecystectomy per pt      Urge urinary incontinence     Hypertension, essential        Past Medical History:   Diagnosis Date    Asthma, mild persistent     Depression with anxiety     [facility]     GERD (gastroesophageal reflux disease)     Glaucoma     History of CVA (cerebrovascular accident)     x 2 per pt; last was ~2010    Hypertension, essential     Microalbuminuria due to type 2 diabetes mellitus ([address]     Nicotine dependence     Umbilical hernia     as a complication  after cholecystectomy per pt    Urge urinary incontinence          Past Surgical History:   Procedure Laterality Date    CATARACT REMOVAL WITH IMPLANT Bilateral     CHOLECYSTECTOMY      COLONOSCOPY  2014, 2016    FOOT SURGERY Right     10/17/13, hardware placed and removed    TONSILLECTOMY      UPPER GASTROINTESTINAL ENDOSCOPY  2014, 2016         Allergies   Allergen Reactions    Latex          Social History     Tobacco Use    Smoking status: Current Every Day Smoker

## 2020-05-01 RX ORDER — CLOPIDOGREL BISULFATE 75 MG/1
75 TABLET ORAL DAILY
Qty: 90 TABLET | Refills: 3 | Status: SHIPPED | OUTPATIENT
Start: 2020-05-01 | End: 2020-11-25 | Stop reason: SDUPTHER

## 2020-05-05 ENCOUNTER — TELEPHONE (OUTPATIENT)
Dept: FAMILY MEDICINE CLINIC | Age: 60
End: 2020-05-05

## 2020-05-08 ENCOUNTER — TELEPHONE (OUTPATIENT)
Dept: FAMILY MEDICINE CLINIC | Age: 60
End: 2020-05-08

## 2020-05-08 ENCOUNTER — TELEPHONE (OUTPATIENT)
Dept: ADMINISTRATIVE | Age: 60
End: 2020-05-08

## 2020-05-21 ENCOUNTER — HOSPITAL ENCOUNTER (OUTPATIENT)
Dept: CT IMAGING | Age: 60
Discharge: HOME | End: 2020-05-21
Payer: MEDICARE

## 2020-05-21 ENCOUNTER — HOSPITAL ENCOUNTER (OUTPATIENT)
Dept: WOMENS IMAGING | Age: 60
Discharge: HOME | End: 2020-05-21
Payer: MEDICARE

## 2020-05-21 PROCEDURE — G0297 LDCT FOR LUNG CA SCREEN: HCPCS

## 2020-05-21 PROCEDURE — 77063 BREAST TOMOSYNTHESIS BI: CPT

## 2020-05-22 ENCOUNTER — TELEPHONE (OUTPATIENT)
Dept: FAMILY MEDICINE CLINIC | Age: 60
End: 2020-05-22

## 2020-05-22 NOTE — TELEPHONE ENCOUNTER
----- Message from David George DO sent at 5/22/2020 10:07 AM EDT -----  Please let pt know that mammogram is normal. Let me know if questions, thanks!

## 2020-05-29 RX ORDER — AMLODIPINE BESYLATE AND BENAZEPRIL HYDROCHLORIDE 10; 20 MG/1; MG/1
CAPSULE ORAL
Qty: 90 CAPSULE | Refills: 3 | Status: SHIPPED | OUTPATIENT
Start: 2020-05-29 | End: 2021-04-27

## 2020-07-15 ENCOUNTER — TELEPHONE (OUTPATIENT)
Dept: FAMILY MEDICINE CLINIC | Age: 60
End: 2020-07-15

## 2020-07-15 NOTE — TELEPHONE ENCOUNTER
Please remind pt to get labs done soon    Thanks! Diagnosis Orders   1.  Type 2 diabetes mellitus with microalbuminuria, without long-term current use of insulin (HCC)  metFORMIN (GLUCOPHAGE) 850 MG tablet

## 2020-08-07 ENCOUNTER — TELEPHONE (OUTPATIENT)
Dept: FAMILY MEDICINE CLINIC | Age: 60
End: 2020-08-07

## 2020-08-07 NOTE — TELEPHONE ENCOUNTER
1st attempt to contact the pt re:overdue labs that Dr Campbell Bolds ordered on 4/30/20. Per pt's HIPAA form a VM was left asking the pt to return our call & let us know if she still plans to have them done.

## 2020-08-27 RX ORDER — ATENOLOL 25 MG/1
TABLET ORAL
Qty: 90 TABLET | Refills: 3 | Status: SHIPPED | OUTPATIENT
Start: 2020-08-27 | End: 2021-07-01

## 2020-08-27 RX ORDER — OXYBUTYNIN CHLORIDE 10 MG/1
TABLET, EXTENDED RELEASE ORAL
Qty: 90 TABLET | Refills: 3 | Status: SHIPPED | OUTPATIENT
Start: 2020-08-27 | End: 2021-06-24 | Stop reason: SDUPTHER

## 2020-09-24 RX ORDER — LANCETS 30 GAUGE
EACH MISCELLANEOUS
Qty: 300 EACH | Refills: 3 | Status: SHIPPED | OUTPATIENT
Start: 2020-09-24

## 2020-09-24 RX ORDER — GLUCOSAMINE HCL/CHONDROITIN SU 500-400 MG
CAPSULE ORAL
Qty: 300 STRIP | Refills: 3 | Status: SHIPPED | OUTPATIENT
Start: 2020-09-24

## 2020-09-24 RX ORDER — HYDROXYZINE HYDROCHLORIDE 25 MG/1
25 TABLET, FILM COATED ORAL NIGHTLY PRN
Qty: 90 TABLET | Refills: 2 | Status: SHIPPED | OUTPATIENT
Start: 2020-09-24

## 2020-09-24 NOTE — TELEPHONE ENCOUNTER
Patient was last seen 4/30/2020 and her next appt is 11/5/2020. Patient is calling in asking for a new order or prescription for a glucometer and any supplies necessary to [facility]. She said the machine she had broke and is not working. She said she is supposed to check her blood sugars twice daily. She is also asking for a refill of her sleeping pill that Dr Yasemin Contreras had prescribed her but she wasn't sure the name. Her pharmacy is [facility], please advise.

## 2020-09-24 NOTE — TELEPHONE ENCOUNTER
Diagnosis Orders   1. Type 2 diabetes mellitus with microalbuminuria, without long-term current use of insulin ([facility])  Blood Glucose Monitoring Suppl KIT    blood glucose monitor strips    Lancets MISC   2.  Depression with anxiety  hydrOXYzine (ATARAX) 25 MG tablet

## 2020-10-21 ENCOUNTER — TELEPHONE (OUTPATIENT)
Dept: FAMILY MEDICINE CLINIC | Age: 60
End: 2020-10-21

## 2020-10-21 NOTE — TELEPHONE ENCOUNTER
2nd attempt to contact the pt re:overdue labs Dr Skye Alfonso ordered on 4/30/20. No current HIPAA form on file so orders could not be mailed.

## 2020-11-04 NOTE — PROGRESS NOTES
Chief Complaint   Patient presents with    Medicare AWV    6 Month Follow-Up     hasnt been checking sugars    Pain     bilateral feet but mostly right foot feel like they are burning, has tingling at times       History obtained from the patient. SUBJECTIVE:  Rylie Whittington is a 62 y.o. female that presents today for     -DM2: On metformin  Last a1c 7.2  Never did labs ordered before, needs orders reprinted  No complications  Typically doesn't check sugars      -Asthma:  Doing well  Taking meds as rx'd    History of asthma?: Yes  Current medication regimen -     Frequency of day symptoms? A few times wk if that. Frequency of night time Sx?  never    Chronic cough?: no  Chest pain/Tightness?:  no  Shortness of breath?: no  Wheezing?  no    Last PFTs - SEPT 2019    Known triggers? Yes  Hospitalized and/or intubated in the past?: No  Smoker or smoke exposure in the home?: Yes  Number of times prescribed oral steroids in the past year - 1      -HTN:    HPI:    Taking meds as prescribed ?: yes  Tolerating well ?: yes  Side Effects ?: denies  BP at home ?: <140/90  Working on TLCS ?: yes  Chest Pain/SOB/Palpitations? denies    BP Readings from Last 3 Encounters:   11/05/20 124/62   10/24/19 136/62   10/17/19 136/74       -GERD:    HPI:    Taking meds as prescribed ?: yes  Tolerating well ?: yes  Side Effects ?: denies  Dysphagia ?: denies  Odynophagia ?: denies  Melena ?: denies  Working on TLCS ?: yes      -Hx of CVA x 2:  Last was 2010  No residual sxs per pt  No weakness  On full dose asa and plavix  Pt not sure why she is on DAPT  Not on statin, pt not sure why not  Wants to get labs done before starting      -Urge urinary incontinence: On ditropan  Works well  Keep sxs well controlled      -Vit d def:   On supplementation  Tolerating well        -Depression/anxiety:  On zoloft and buspar  Working well  Moods stable  Denies SI/HI      -Foot pain/numbness:  Getting recurrent burning pain in feet  Feel numb at times  Worse at night  Going on about 6+ months  R foot worse than L      -Smoking PRIOR VISIT:  Wants to quit  Would like to try chantix    UPDATE PRIOR VISIT:   Has chantix  Hasn't started yet  Hasn't picked quit date  Encouraged to do so     UPDATE LAST VISIT:   1/2 PPD yet  Tried chantix, but didn't help  Not ready to quit.       UPDATE TODAY:   Still smoking  About 3/4 to full PPD  Not ready to quit         Age/Gender Health Maintenance    Lipid -   Lab Results   Component Value Date    CHOL 200 (H) 01/08/2019    CHOL 207 (H) 09/21/2016    CHOL 171 10/06/2015     Lab Results   Component Value Date    TRIG 287 (H) 01/08/2019    TRIG 178 09/21/2016    TRIG 176 10/06/2015     Lab Results   Component Value Date    HDL 34 01/08/2019    HDL 40 09/21/2016    HDL 42 10/06/2015     Lab Results   Component Value Date    LDLCALC 109 01/08/2019    [address] 09/21/2016    LDLCALC 94 10/06/2015     No results found for: LABVLDL, VLDL  No results found for: CHOLHDLRATIO      TSH -   Lab Results   Component Value Date    TSH 2.650 01/08/2019       Colon Cancer Screening - + multiple polyps DEC 2016, repeat 3 years per GI, Jennifer  Lung Cancer Screening (Age 55 to [de-identified] with 30 pack year hx, current smoker or quit within past 15 years) - NEG MAY 2020    Tetanus - to get at pharmacy per medicare rules  Influenza Vaccine - UTD FALL 2020  Pneumonia Vaccine - UTD NOV 2016, PPV 23  Zoster - to get at pharmacy per medicare rules     Breast Cancer Screening - NEG MAY 2020  Cervical Cancer Screening - disucss next visit  Osteoporosis Screening - age 73      Diabetes Health Maintenance    A1C -   Lab Results   Component Value Date    LABA1C 7.2 11/05/2020    LABA1C 6.8 08/27/2019     ACE/ARB - yes, benazapril  Eye - records pending  Foot - UTD NOV 2020  ASA - yes    Microal/Cr -   Lab Results   Component Value Date    LABMICR 54.10 01/08/2019    LABCREA 233.2 01/08/2019    MACRR 232 (H) 01/08/2019       eGFR -   No results found for: Alyssa Macario  Lab Results   Component Value Date    LABGLOM 86 10/13/2019       Statin - no, not clear why not        Current Outpatient Medications   Medication Sig Dispense Refill    omega-3 acid ethyl esters (LOVAZA) 1 g capsule       hydrOXYzine (ATARAX) 25 MG tablet Take 1 tablet by mouth nightly as needed for Anxiety 90 tablet 2    oxybutynin (DITROPAN-XL) 10 MG extended release tablet TAKE 1 TABLET BY MOUTH ONCE DAILY 90 tablet 3    atenolol (TENORMIN) 25 MG tablet TAKE 1 TABLET BY MOUTH ONCE DAILY 90 tablet 3    metFORMIN (GLUCOPHAGE) 850 MG tablet Take 1 tablet by mouth 2 times daily (with meals) 180 tablet 3    amLODIPine-benazepril (LOTREL) 10-20 MG per capsule TAKE 1 CAPSULE BY MOUTH ONCE DAILY 90 capsule 3    clopidogrel (PLAVIX) 75 MG tablet Take 1 tablet by mouth daily 90 tablet 3    busPIRone (BUSPAR) 10 MG tablet 2 tab in am and 1 qhs 270 tablet 3    albuterol sulfate  (90 Base) MCG/ACT inhaler Inhale 2 puffs into the lungs every 6 hours as needed for Wheezing      lansoprazole (PREVACID SOLUTAB) 30 MG disintegrating tablet Take 30 mg by mouth daily      albuterol sulfate HFA (PROAIR HFA) 108 (90 Base) MCG/ACT inhaler Inhale 2 puffs into the lungs every 4 hours as needed for Wheezing or Shortness of Breath 2 Inhaler 3    vitamin D (CHOLECALCIFEROL) 1000 UNITS TABS tablet Take 2,000 Units by mouth daily       pantoprazole (PROTONIX) 40 MG tablet Take 40 mg by mouth daily.  fluticasone (FLOVENT HFA) 110 MCG/ACT inhaler Inhale 1 puff into the lungs 2 times daily.  sertraline (ZOLOFT) 100 MG tablet Take 100 mg by mouth daily.  aspirin 325 MG tablet Take 325 mg by mouth daily.         Blood Glucose Monitoring Suppl KIT Use As Directed (Patient not taking: Reported on 11/5/2020) 1 kit 0    blood glucose monitor strips Use to check sugars twice daily (Patient not taking: Reported on 11/5/2020) 300 strip 3    Lancets MISC Use to check sugars twice daily (Patient not taking: Reported on 11/5/2020) 300 each 3     No current facility-administered medications for this visit. No orders of the defined types were placed in this encounter. All medications reviewed and reconciled, including OTC and herbal medications. Updated list given to patient.        Patient Active Problem List    Diagnosis Date Noted    Asthma, mild persistent     Depression with anxiety     DM2 (diabetes mellitus, type 2) ([address]     GERD (gastroesophageal reflux disease)     Glaucoma     History of CVA (cerebrovascular accident)      x 2 per pt; last was ~2010      Microalbuminuria due to type 2 diabetes mellitus ([address]     Nicotine dependence     Umbilical hernia      as a complication  after cholecystectomy per pt      Urge urinary incontinence     Hypertension, essential        Past Medical History:   Diagnosis Date    Asthma, mild persistent     Depression with anxiety     [facility]     GERD (gastroesophageal reflux disease)     Glaucoma     History of CVA (cerebrovascular accident)     x 2 per pt; last was ~2010    Hypertension, essential     Microalbuminuria due to type 2 diabetes mellitus ([address]     Nicotine dependence     Umbilical hernia     as a complication  after cholecystectomy per pt    Urge urinary incontinence        Past Surgical History:   Procedure Laterality Date    CATARACT REMOVAL WITH IMPLANT Bilateral     CHOLECYSTECTOMY      COLONOSCOPY  2014, 2016    FOOT SURGERY Right     10/17/13, hardware placed and removed    TONSILLECTOMY      UPPER GASTROINTESTINAL ENDOSCOPY  2014, 2016       Allergies   Allergen Reactions    Latex        Social History     Tobacco Use    Smoking status: Current Every Day Smoker     Packs/day: 1.00     Years: 33.00     Pack years: 33.00    Smokeless tobacco: Never Used   Substance Use Topics    Alcohol use: No       Family History   Problem Relation Age of Onset    Diabetes Mother  Glaucoma Mother     Heart Attack Father     Diabetes Sister     Diabetes Brother     Colon Polyps Brother 52    Colon Polyps Sister 54    Colon Cancer Neg Hx     Breast Cancer Neg Hx          I have reviewed the patient's past medical history, past surgical history, allergies, medications, social and family history and I have made updates where appropriate. PHYSICAL EXAM:  Vitals:    11/05/20 1306   BP: 124/62   Pulse: 74   Resp: 18   Temp: 97.2 °F (36.2 °C)   TempSrc: Temporal   SpO2: 96%   Weight: 262 lb 6.4 oz (119 kg)   Height: 5' 7\" (1.702 m)     Body mass index is 41.1 kg/m². Pain Score:   3(feet)    VS Reviewed  General Appearance: A&O x 3, No acute distress,well developed and well- nourished  Eyes: pupils equal, round, and reactive to light, extraocular eye movements intact, conjunctivae and eye lids without erythema  ENT: external ear and ear canal clear bilaterally, TMs intact and regular, nose without deformity, nasal mucosa and turbinates normal without polyps, oropharynx normal, dentition is normal for age  Neck: supple and non-tender without mass, no thyromegaly or thyroid nodules, no cervical lymphadenopathy  Pulmonary/Chest: clear to auscultation bilaterally- no wheezes, rales or rhonchi, normal air movement, no respiratory distress or retractions  Cardiovascular: S1 and S2 auscultated w/ RRR. No murmurs, rubs, clicks, or gallops, distal pulses intact. Abdomen: soft, non-tender, non-distended, bowel sounds physiologic,  no rebound or guarding, no masses or hernias noted. Liver and spleen without enlargement. Extremities: no cyanosis, clubbing or edema of the lower extremities. Skin: warm and dry, no rash or erythema  Psych: Affect appropriate. Mood euthymic. Thought process is normal without evidence of depression or psychosis. Good insight and appropriate interaction. Cognition and memory appear to be intact. Foot: Bilat 2+DP/PT bilaterally. Skin warm, dry and intact.  Sensation risks of continued smoking as well as resources available to help quit. These include tobacco cessation classes as well as 1-800-QUIT NOW. No barriers other than lack of desire. 3+ min spent counseling. 14. Needs flu shot    - INFLUENZA, QUADV, 3 YRS AND OLDER, IM PF, PREFILL SYR OR SDV, 0.5ML (Carmita Jesus, ELENA)      DISPOSITION    Return in about 6 months (around 2021) for Follow-up Diabetes, follow-up on chronic medical conditions, sooner as needed. Justin Oviedo released without restrictions. Future Appointments   Date Time Provider Demi Farley   2021  3:40 PM Carline Lynn DO Michael Reed received counseling on the following healthy behaviors: nutrition, exercise, medication adherence and tobacco cessation    Patient given educational materials on: See Attached    I have instructed Justin Oviedo to complete a self tracking handout on Blood Pressures  and Smoking and instructed them to bring it with them to her next appointment. Barriers to learning and self management: none    Discussed use, benefit, and side effects of prescribed medications. Barriers to medication compliance addressed. All patient questions answered. Pt voiced understanding. Electronically signed by Carline Lynn DO on 2020 at 1:35 PM        Medicare Annual Wellness Visit  Name: [facility] Date: 2020   MRN: [id number] Sex: Female   Age: 62 y.o. Ethnicity: Non-/Non    : 1960 Race: Andrew Euceda is here for Medicare AWV; 6 Month Follow-Up (hasnt been checking sugars); and Pain (bilateral feet but mostly right foot feel like they are burning, has tingling at times)    Screenings for behavioral, psychosocial and functional/safety risks, and cognitive dysfunction are all negative except as indicated below.  These results, as well as other patient data from the Health Risk Assessment form, are documented in Flowsheets linked to this Encounter. Allergies   Allergen Reactions    Latex          Prior to Visit Medications    Medication Sig Taking? Authorizing Provider   omega-3 acid ethyl esters (LOVAZA) 1 g capsule  Yes Historical Provider, MD   hydrOXYzine (ATARAX) 25 MG tablet Take 1 tablet by mouth nightly as needed for Anxiety Yes  Beka, DO   oxybutynin (DITROPAN-XL) 10 MG extended release tablet TAKE 1 TABLET BY MOUTH ONCE DAILY Yes Hakan Manning, DO   atenolol (TENORMIN) 25 MG tablet TAKE 1 TABLET BY MOUTH ONCE DAILY Yes Hakan Manning, DO   metFORMIN (GLUCOPHAGE) 850 MG tablet Take 1 tablet by mouth 2 times daily (with meals) Yes Hakan Manning, DO   amLODIPine-benazepril (LOTREL) 10-20 MG per capsule TAKE 1 CAPSULE BY MOUTH ONCE DAILY Yes William Manning, DO   clopidogrel (PLAVIX) 75 MG tablet Take 1 tablet by mouth daily Yes Hakan Manning, DO   busPIRone (BUSPAR) 10 MG tablet 2 tab in am and 1 qhs Yes Hakan Manning, DO   albuterol sulfate  (90 Base) MCG/ACT inhaler Inhale 2 puffs into the lungs every 6 hours as needed for Wheezing Yes Historical Provider, MD   lansoprazole (PREVACID SOLUTAB) 30 MG disintegrating tablet Take 30 mg by mouth daily Yes Historical Provider, MD   albuterol sulfate HFA (PROAIR HFA) 108 (90 Base) MCG/ACT inhaler Inhale 2 puffs into the lungs every 4 hours as needed for Wheezing or Shortness of Breath Yes  Beka, DO   vitamin D (CHOLECALCIFEROL) 1000 UNITS TABS tablet Take 2,000 Units by mouth daily  Yes Historical Provider, MD   pantoprazole (PROTONIX) 40 MG tablet Take 40 mg by mouth daily. Yes Historical Provider, MD   fluticasone (FLOVENT HFA) 110 MCG/ACT inhaler Inhale 1 puff into the lungs 2 times daily. Yes Historical Provider, MD   sertraline (ZOLOFT) 100 MG tablet Take 100 mg by mouth daily. Yes Historical Provider, MD   aspirin 325 MG tablet Take 325 mg by mouth daily.    Yes Historical Provider, MD   Blood Glucose Monitoring Suppl KIT Use As Directed  Patient not taking: Reported on 11/5/2020  Chris Villalpando DO   blood glucose monitor strips Use to check sugars twice daily  Patient not taking: Reported on 11/5/2020  Chris Villalpando DO   Lancets MISC Use to check sugars twice daily  Patient not taking: Reported on 11/5/2020  Chris Villalpando DO         Past Medical History:   Diagnosis Date    Asthma, mild persistent     Depression with anxiety     [facility]     GERD (gastroesophageal reflux disease)     Glaucoma     History of CVA (cerebrovascular accident)     x 2 per pt; last was ~2010    Hypertension, essential     Microalbuminuria due to type 2 diabetes mellitus [facility]     Nicotine dependence     Umbilical hernia     as a complication  after cholecystectomy per pt    Urge urinary incontinence        Past Surgical History:   Procedure Laterality Date    CATARACT REMOVAL WITH IMPLANT Bilateral     CHOLECYSTECTOMY      COLONOSCOPY  2014, 2016    FOOT SURGERY Right     10/17/13, hardware placed and removed    TONSILLECTOMY      UPPER GASTROINTESTINAL ENDOSCOPY  2014, 2016         Family History   Problem Relation Age of Onset    Diabetes Mother     Glaucoma Mother     Heart Attack Father     Diabetes Sister     Diabetes Brother     Colon Polyps Brother 52    Colon Polyps Sister 54    Colon Cancer Neg Hx     Breast Cancer Neg Hx        CareTeam (Including outside providers/suppliers regularly involved in providing care):   Patient Care Team:  Chris Villalpando DO as PCP - General (Family Medicine)  Chris Villalpando DO as PCP - [facility] Empaneled Provider    Wt Readings from Last 3 Encounters:   11/05/20 262 lb 6.4 oz (119 kg)   10/24/19 239 lb (108.4 kg)   10/17/19 240 lb 9.6 oz (109.1 kg)     Vitals:    11/05/20 1306   BP: 124/62   Pulse: 74   Resp: 18   Temp: 97.2 °F (36.2 °C)   TempSrc: Temporal   SpO2: 96%   Weight: 262 lb 6.4 oz (119 kg)   Height: months?: No  Do you eat fewer than 2 meals per day?: No  Have you seen a dentist within the past year?: (!) No  Body mass index: (!) 41.09  Health Habits/Nutrition Interventions:  · Inadequate physical activity:  patient agrees to exercise for at least 150 minutes/week  · Nutritional issues:  educational materials for healthy, well-balanced diet provided  · Dental exam overdue:  patient encouraged to make appointment with his/her dentist    Hearing/Vision:  No exam data present  Hearing/Vision  Do you or your family notice any trouble with your hearing?: No  Do you have difficulty driving, watching TV, or doing any of your daily activities because of your eyesight?: No  Have you had an eye exam within the past year?: (!) No  Hearing/Vision Interventions:  · Vision concerns:  patient encouraged to make appointment with his/her eye specialist    Safety:  Safety  Do you have working smoke detectors?: Yes  Have all throw rugs been removed or fastened?: Yes  Do you have non-slip mats or surfaces in all bathtubs/showers?: (!) No  Do all of your stairways have a railing or banister?: Yes  Are your doorways, halls and stairs free of clutter?: Yes  Do you always fasten your seatbelt when you are in a car?: Yes  Safety Interventions:  · Home safety tips provided    ADL:  ADLs  In the past 7 days, did you need help from others to perform any of the following everyday activities? Eating, dressing, grooming, bathing, toileting, or walking/balance?: None  In the past 7 days, did you need help from others to take care of any of the following?  Laundry, housekeeping, banking/finances, shopping, telephone use, food preparation, transportation, or taking medications?: [place], Transportation  ADL Interventions:  · has help form family    Personalized Preventive Plan   Current Health Maintenance Status  Immunization History   Administered Date(s) Administered    Influenza, Quadv, IM, PF (6 mo and older Fluzone, Flulaval, Fluarix, and 3 yrs and older Afluria) 10/24/2019, 11/05/2020    Pneumococcal Polysaccharide ([id number]) 11/02/2016        Health Maintenance   Topic Date Due    Hepatitis C screen  1960    Diabetic retinal exam  05/11/1971    HIV screen  05/11/1976    Hepatitis B vaccine (1 of 3 - Risk 3-dose series) 05/11/1980    DTaP/Tdap/Td vaccine (1 - Tdap) 05/11/1980    Cervical cancer screen  05/11/1982    Shingles Vaccine (1 of 2) 05/11/2011    Annual Wellness Visit (AWV)  06/23/2019    Colon cancer screen colonoscopy  12/23/2019    Lipid screen  01/08/2020    Potassium monitoring  10/13/2020    Creatinine monitoring  10/13/2020    A1C test (Diabetic or Prediabetic)  05/05/2021    Breast cancer screen  05/21/2021    Low dose CT lung screening  05/21/2021    Diabetic foot exam  11/05/2021    Flu vaccine  Completed    Pneumococcal 0-64 years Vaccine  Completed    Hepatitis A vaccine  Aged Out    Hib vaccine  Aged Out    Meningococcal (ACWY) vaccine  Aged Out     Recommendations for [employer] Due: see orders and patient instructions/AVS.  . Recommended screening schedule for the next 5-10 years is provided to the patient in written form: see Patient Instructions/AVS.    Sharon Wheeler was seen today for medicare awv, 6 month follow-up, pain and health maintenance. Diagnoses and all orders for this visit:      Routine general medical examination at a health care facility      Care plan reviewed with and given to patient.        Electronically signed by Shailesh Cooper DO on 11/5/2020 at 1:35 PM

## 2020-11-04 NOTE — PATIENT INSTRUCTIONS
LAB INSTRUCTIONS:    Please complete labs within 4 week(s). Please fast for 8 hours prior to lab collection. The clinic will call you within 1 week of collection. If you have not heard from us within that amount of time, please call us at [phone]. Personalized Preventive Plan for Po Due - 11/5/2020  Medicare offers a range of preventive health benefits. Some of the tests and screenings are paid in full while other may be subject to a deductible, co-insurance, and/or copay. Some of these benefits include a comprehensive review of your medical history including lifestyle, illnesses that may run in your family, and various assessments and screenings as appropriate. After reviewing your medical record and screening and assessments performed today your provider may have ordered immunizations, labs, imaging, and/or referrals for you. A list of these orders (if applicable) as well as your Preventive Care list are included within your After Visit Summary for your review. Other Preventive Recommendations:    · A preventive eye exam performed by an eye specialist is recommended every 1-2 years to screen for glaucoma; cataracts, macular degeneration, and other eye disorders. · A preventive dental visit is recommended every 6 months. · Try to get at least 150 minutes of exercise per week or 10,000 steps per day on a pedometer . · Order or download the FREE \"Exercise & Physical Activity: Your Everyday Guide\" from [employer]. Call 6-114.563.1417 or search [employer] online. · You need 0613-4487 mg of calcium and 8432-1922 IU of vitamin D per day. It is possible to meet your calcium requirement with diet alone, but a vitamin D supplement is usually necessary to meet this goal.  · When exposed to the sun, use a sunscreen that protects against both UVA and UVB radiation with an SPF of 30 or greater.  Reapply every 2 to 3 hours or after sweating, drying off with a towel, or swimming. · Always wear a seat belt when traveling in a car. Always wear a helmet when riding a bicycle or motorcycle.

## 2020-11-05 ENCOUNTER — OFFICE VISIT (OUTPATIENT)
Dept: FAMILY MEDICINE CLINIC | Age: 60
End: 2020-11-05
Payer: MEDICARE

## 2020-11-05 VITALS
RESPIRATION RATE: 18 BRPM | TEMPERATURE: 97.2 F | HEIGHT: 67 IN | HEART RATE: 74 BPM | BODY MASS INDEX: 41.18 KG/M2 | OXYGEN SATURATION: 96 % | WEIGHT: 262.4 LBS | SYSTOLIC BLOOD PRESSURE: 124 MMHG | DIASTOLIC BLOOD PRESSURE: 62 MMHG

## 2020-11-05 LAB — HBA1C MFR BLD: 7.2 % (ref 4.3–5.7)

## 2020-11-05 PROCEDURE — G0438 PPPS, INITIAL VISIT: HCPCS | Performed by: FAMILY MEDICINE

## 2020-11-05 PROCEDURE — G8427 DOCREV CUR MEDS BY ELIG CLIN: HCPCS | Performed by: FAMILY MEDICINE

## 2020-11-05 PROCEDURE — 99213 OFFICE O/P EST LOW 20 MIN: CPT | Performed by: FAMILY MEDICINE

## 2020-11-05 PROCEDURE — 3017F COLORECTAL CA SCREEN DOC REV: CPT | Performed by: FAMILY MEDICINE

## 2020-11-05 PROCEDURE — 2022F DILAT RTA XM EVC RTNOPTHY: CPT | Performed by: FAMILY MEDICINE

## 2020-11-05 PROCEDURE — G0008 ADMIN INFLUENZA VIRUS VAC: HCPCS | Performed by: FAMILY MEDICINE

## 2020-11-05 PROCEDURE — G8482 FLU IMMUNIZE ORDER/ADMIN: HCPCS | Performed by: FAMILY MEDICINE

## 2020-11-05 PROCEDURE — 4004F PT TOBACCO SCREEN RCVD TLK: CPT | Performed by: FAMILY MEDICINE

## 2020-11-05 PROCEDURE — G8417 CALC BMI ABV UP PARAM F/U: HCPCS | Performed by: FAMILY MEDICINE

## 2020-11-05 PROCEDURE — 90686 IIV4 VACC NO PRSV 0.5 ML IM: CPT | Performed by: FAMILY MEDICINE

## 2020-11-05 PROCEDURE — 3051F HG A1C>EQUAL 7.0%<8.0%: CPT | Performed by: FAMILY MEDICINE

## 2020-11-05 RX ORDER — OMEGA-3-ACID ETHYL ESTERS 1 G/1
CAPSULE, LIQUID FILLED ORAL
COMMUNITY
Start: 2020-11-05

## 2020-11-05 ASSESSMENT — PATIENT HEALTH QUESTIONNAIRE - PHQ9
2. FEELING DOWN, DEPRESSED OR HOPELESS: 0
SUM OF ALL RESPONSES TO PHQ QUESTIONS 1-9: 0
1. LITTLE INTEREST OR PLEASURE IN DOING THINGS: 0
SUM OF ALL RESPONSES TO PHQ QUESTIONS 1-9: 0
SUM OF ALL RESPONSES TO PHQ QUESTIONS 1-9: 0
SUM OF ALL RESPONSES TO PHQ9 QUESTIONS 1 & 2: 0

## 2020-11-05 ASSESSMENT — LIFESTYLE VARIABLES: HOW OFTEN DO YOU HAVE A DRINK CONTAINING ALCOHOL: 0

## 2020-11-25 ENCOUNTER — TELEPHONE (OUTPATIENT)
Dept: FAMILY MEDICINE CLINIC | Age: 60
End: 2020-11-25

## 2020-11-25 RX ORDER — CLOPIDOGREL BISULFATE 75 MG/1
75 TABLET ORAL DAILY
Qty: 90 TABLET | Refills: 3 | Status: SHIPPED | OUTPATIENT
Start: 2020-11-25 | End: 2021-04-19 | Stop reason: SDUPTHER

## 2020-11-25 NOTE — TELEPHONE ENCOUNTER
Misha Schwartz called requesting a refill on the following medications:  Requested Prescriptions     Pending Prescriptions Disp Refills    clopidogrel (PLAVIX) 75 MG tablet 90 tablet 3     Sig: Take 1 tablet by mouth daily    metFORMIN (GLUCOPHAGE) 850 MG tablet 180 tablet 3     Sig: Take 1 tablet by mouth 2 times daily (with meals)     Pharmacy verified:  [facility]      Date of last visit: 11/5/20  Date of next visit (if applicable): 6/8/49

## 2020-11-25 NOTE — TELEPHONE ENCOUNTER
DM supplies   We received a fax from [employer] diabetic supply company , Is this a company you want to star receiving  DM supplies from?      Form attached

## 2020-11-27 RX ORDER — BUSPIRONE HYDROCHLORIDE 10 MG/1
TABLET ORAL
Qty: 270 TABLET | Refills: 3 | Status: SHIPPED | OUTPATIENT
Start: 2020-11-27 | End: 2021-02-02 | Stop reason: SDUPTHER

## 2020-11-30 NOTE — TELEPHONE ENCOUNTER
Spoke with pt, she gets her DM supplies from [employer]. She is not wanting to use [employer] diabetic supply company. Form faxed back to [employer] stating pt is not wanting to use their services.

## 2021-01-04 ENCOUNTER — TELEPHONE (OUTPATIENT)
Dept: FAMILY MEDICINE CLINIC | Age: 61
End: 2021-01-04

## 2021-01-04 NOTE — TELEPHONE ENCOUNTER
Patient had a referral for EMG w/Dr Turner on 12/1/2020. Test was cancelled due to \"Late Cancellation (PATIENT CALLED AND LM REQUESTING TO CANCEL APPOINTMENT). \"    Ok to cancel referral. It has not been rescheduled.

## 2021-02-02 ENCOUNTER — TELEPHONE (OUTPATIENT)
Dept: FAMILY MEDICINE CLINIC | Age: 61
End: 2021-02-02

## 2021-02-02 DIAGNOSIS — F41.8 DEPRESSION WITH ANXIETY: Chronic | ICD-10-CM

## 2021-02-02 RX ORDER — BUSPIRONE HYDROCHLORIDE 10 MG/1
TABLET ORAL
Qty: 270 TABLET | Refills: 3 | Status: SHIPPED | OUTPATIENT
Start: 2021-02-02 | End: 2021-06-02 | Stop reason: SDUPTHER

## 2021-02-02 NOTE — TELEPHONE ENCOUNTER
Name of Caller: Kiley Millard     Relationship to patient:Pharmacy    Organization name: [employer] contact number: [phone]    Reason for call: Script sent in on 11.27.2020 for Buspirone and there is two sets of directions and the company is just wanting some clarification on giving it to the pt please follow up, stated that they have been trying to get in contact for weeks

## 2021-02-02 NOTE — TELEPHONE ENCOUNTER
1st I'm hearing of need for clarification. Take 2 in AM and 1 tab QHS     Diagnosis Orders   1.  Depression with anxiety  busPIRone (BUSPAR) 10 MG tablet

## 2021-02-02 NOTE — TELEPHONE ENCOUNTER
[facility] has called the office to clarify the sig for the buspirone several times, but no pt information was given. When we called [employer] [facility] we would be on hold for 10 minutes or more. With the phones being busy its not appropriate for us to be on hold for that amount of time.

## 2021-04-19 DIAGNOSIS — Z86.73 HISTORY OF CVA (CEREBROVASCULAR ACCIDENT): Chronic | ICD-10-CM

## 2021-04-19 RX ORDER — CLOPIDOGREL BISULFATE 75 MG/1
75 TABLET ORAL DAILY
Qty: 90 TABLET | Refills: 3 | Status: SHIPPED | OUTPATIENT
Start: 2021-04-19 | End: 2022-02-21

## 2021-04-27 DIAGNOSIS — I10 HYPERTENSION, ESSENTIAL: ICD-10-CM

## 2021-04-27 RX ORDER — AMLODIPINE BESYLATE AND BENAZEPRIL HYDROCHLORIDE 10; 20 MG/1; MG/1
CAPSULE ORAL
Qty: 90 CAPSULE | Refills: 3 | Status: SHIPPED | OUTPATIENT
Start: 2021-04-27 | End: 2022-03-22

## 2021-05-06 ENCOUNTER — TELEPHONE (OUTPATIENT)
Dept: FAMILY MEDICINE CLINIC | Age: 61
End: 2021-05-06

## 2021-05-06 NOTE — TELEPHONE ENCOUNTER
Pt scheduled for appt today 5/6/21 with  Dr Navya Jerry  at 3:40 . Provider requested that we call pt to see if this time still works for her. I called the pt's number [phone], and a man answered stating this is not Che's number. Called pt emergency contact (brother) Armando Preston (on signed HIPAA) and left detailed msg on his answering mach requesting that he contact his sister and ask her to call at her earliest convenience to verify appt time and date.

## 2021-05-07 NOTE — TELEPHONE ENCOUNTER
Pt called to reschedule her new patient appt, she forgot she had an appt  Please advise if ok to reschedule appt.

## 2021-05-07 NOTE — TELEPHONE ENCOUNTER
Future Appointments   Date Time Provider Demi Farley   6/2/2021 10:40 AM Jose G Daniels DO [facility] [address]

## 2021-05-24 ENCOUNTER — HOSPITAL ENCOUNTER (OUTPATIENT)
Dept: WOMENS IMAGING | Age: 61
Discharge: HOME | End: 2021-05-24
Payer: MEDICARE

## 2021-05-24 DIAGNOSIS — Z12.31 VISIT FOR SCREENING MAMMOGRAM: ICD-10-CM

## 2021-05-24 PROCEDURE — 77063 BREAST TOMOSYNTHESIS BI: CPT

## 2021-05-25 ENCOUNTER — TELEPHONE (OUTPATIENT)
Dept: FAMILY MEDICINE CLINIC | Age: 61
End: 2021-05-25

## 2021-05-28 ENCOUNTER — TELEPHONE (OUTPATIENT)
Dept: FAMILY MEDICINE CLINIC | Age: 61
End: 2021-05-28

## 2021-05-28 NOTE — TELEPHONE ENCOUNTER
1st attempt to contact the pt re:overdue labs that Dr Morena Oates ordered on 2020.  Pt answered the phone and stated she will be getting them completed sometime soon before the labs  in 2021

## 2021-06-01 NOTE — PROGRESS NOTES
Chief Complaint   Patient presents with    6 Month Follow-Up     chronic issues    Asthma     worse d/t not taking meds    Anxiety       History obtained from the patient. SUBJECTIVE:  Mo Dobbs is a 62 y.o. female that presents today for     -DM2: On metformin  Last a1c 7.5  Never did labs ordered before, needs orders reprinted  No complications  Typically doesn't check sugars      -Asthma:  Asthma worse, but hasn't been using her inhalers at all  Needs new rx    History of asthma?: Yes  Current medication regimen -     Frequency of day symptoms? A few times wk if that when takes flovent as rx'd. Right now using every day  Frequency of night time Sx?  never    Chronic cough?: no  Chest pain/Tightness?:  no  Shortness of breath?: no  Wheezing?  no    Last PFTs - SEPT 2019    Known triggers? Yes  Hospitalized and/or intubated in the past?: No  Smoker or smoke exposure in the home?: Yes  Number of times prescribed oral steroids in the past year - 1      -HTN:    HPI:    Taking meds as prescribed ?: yes  Tolerating well ?: yes  Side Effects ?: denies  BP at home ?: <140/90  Working on TLCS ?: yes  Chest Pain/SOB/Palpitations? denies    BP Readings from Last 3 Encounters:   06/02/21 (!) 126/58   11/05/20 124/62   10/24/19 136/62       -GERD:    HPI:    Taking meds as prescribed ?: yes  Tolerating well ?: yes  Side Effects ?: denies  Dysphagia ?: denies  Odynophagia ?: denies  Melena ?: denies  Working on TLCS ?: yes      -Hx of CVA x 2:  Last was 2010  No residual sxs per pt  No weakness  On full dose asa and plavix  Pt not sure why she is on DAPT  Not on statin, pt not sure why not  Wants to get labs done before starting, reordered today      -Urge urinary incontinence: On ditropan  Works well  Keep sxs well controlled      -Vit d def:   On supplementation  Tolerating well        -Depression/anxiety:  On zoloft and buspar and prn atarax  Anxiety worse over the last few months  ataralx helps, on zoloft 100mg  Denies SI/HI      -Foot pain/numbness LAST VISIT:  Getting recurrent burning pain in feet  Feel numb at times  Worse at night  Going on about 6+ months  R foot worse than L    UPDATE TODAY:   No change in above  Did not do labs, xray or EMG       -Smoking PRIOR VISIT:  Wants to quit  Would like to try chantix    UPDATE PRIOR VISIT:   Has chantix  Hasn't started yet  Hasn't picked quit date  Encouraged to do so     UPDATE PRIOR VISIT:   1/2 PPD yet  Tried chantix, but didn't help  Not ready to quit. UPDATE TODAY:   Still smoking  About 3/4 to full PPD  Not ready to quit      Age/Gender Health Maintenance    Lipid -   Lab Results   Component Value Date    CHOL 200 (H) 01/08/2019    CHOL 207 (H) 09/21/2016    CHOL 171 10/06/2015     Lab Results   Component Value Date    TRIG 287 (H) 01/08/2019    TRIG 178 09/21/2016    TRIG 176 10/06/2015     Lab Results   Component Value Date    HDL 34 01/08/2019    HDL 40 09/21/2016    HDL 42 10/06/2015     Lab Results   Component Value Date    LDLCALC 109 01/08/2019    [address] 131 09/21/2016    LDLCALC 94 10/06/2015     No results found for: LABVLDL, VLDL  No results found for: CHOLHDLRATIO      TSH -   Lab Results   Component Value Date    TSH 2.650 01/08/2019       Colon Cancer Screening - + multiple polyps DEC 2016, repeat 3 years per Jennifer SEBASTIAN.  Pt to call to schedule (June 2021)  Lung Cancer Screening (Age 54 to [de-identified] with 30 pack year hx, current smoker or quit within past 15 years) - NEG MAY 2020/ordered June 2021    Tetanus - to get at pharmacy per medicare rules  Influenza Vaccine - UTD FALL 2020  Pneumonia Vaccine - UTD NOV 2016, PPV 23  Zoster - to get at pharmacy per medicare rules     Breast Cancer Screening - NEG MAY 2021  Cervical Cancer Screening - disucss next visit  Osteoporosis Screening - age 73      Diabetes Health Maintenance    A1C -   Lab Results   Component Value Date    LABA1C 7.4 06/02/2021    LABA1C 7.2 11/05/2020    LABA1C 6.8 08/27/2019 vitamin D (CHOLECALCIFEROL) 1000 UNITS TABS tablet Take 2,000 Units by mouth daily       sertraline (ZOLOFT) 100 MG tablet Take 100 mg by mouth daily.  aspirin 325 MG tablet Take 325 mg by mouth daily. No current facility-administered medications for this visit. Orders Placed This Encounter   Medications    albuterol sulfate HFA (PROAIR HFA) 108 (90 Base) MCG/ACT inhaler     Sig: Inhale 2 puffs into the lungs every 4 hours as needed for Wheezing or Shortness of Breath     Dispense:  2 Inhaler     Refill:  3    fluticasone (FLOVENT HFA) 110 MCG/ACT inhaler     Sig: Inhale 1 puff into the lungs 2 times daily     Dispense:  3 Inhaler     Refill:  3    busPIRone (BUSPAR) 15 MG tablet     Sig: Take 15 mg by mouth 2 times daily     Dispense:  180 tablet     Refill:  3    predniSONE (DELTASONE) 20 MG tablet     Sig: Take 2 tablets by mouth daily for 5 days     Dispense:  10 tablet     Refill:  0         All medications reviewed and reconciled, including OTC and herbal medications. Updated list given to patient.        Patient Active Problem List    Diagnosis Date Noted    Asthma, mild persistent     Depression with anxiety     [facility]2 [facility]diabetes mellitus, type 2) [facility] [address] [facility]     GERD (gastroesophageal reflux disease)     Glaucoma     History of CVA (cerebrovascular accident)      x 2 per pt; last was ~2010      [facility]album[facility]uria due to type [facility] diabetes [facility]     Nicotine dependence     Umbilical hernia      as a complication  after cholecystectomy per pt      Urge urinary incontinence     Hypertension, essential        Past Medical History:   Diagnosis Date    Asthma, mild persistent     Depression with anxiety     DM2 [facility]di[facility] mellitus, type 2) [facility]     GERD (gastroesophageal reflux disease)     Glaucoma     History of CVA (cerebrovascular accident)     x 2 per pt; last was ~2010    Hypertension, essential     Microalbuminuria due to type 2 diabetes mellitus ([facility])     Nicotine dependence     Umbilical hernia     as a complication  after cholecystectomy per pt    Urge urinary incontinence        Past Surgical History:   Procedure Laterality Date    CATARACT REMOVAL WITH IMPLANT Bilateral     CHOLECYSTECTOMY      COLONOSCOPY  2014, 2016    FOOT SURGERY Right     10/17/13, hardware placed and removed    TONSILLECTOMY      UPPER GASTROINTESTINAL ENDOSCOPY  2014, 2016       Allergies   Allergen Reactions    Latex        Social History     Tobacco Use    Smoking status: Current Every Day Smoker     Packs/day: 1.00     Years: 33.00     Pack years: 33.00    Smokeless tobacco: Never Used   Substance Use Topics    Alcohol use: No       Family History   Problem Relation Age of Onset    Diabetes Mother     Glaucoma Mother     Heart Attack Father     Diabetes Sister     Breast Cancer Sister     Diabetes Brother     Colon Polyps Brother 52    Colon Polyps Sister 54    Colon Cancer Neg Hx          I have reviewed the patient's past medical history, past surgical history, allergies, medications, social and family history and I have made updates where appropriate. PHYSICAL EXAM:  Vitals:    06/02/21 1046   BP: (!) 126/58   Pulse: 72   Resp: 22   Temp: 98.8 °F (37.1 °C)   TempSrc: Oral   SpO2: 96%   Weight: 255 lb (115.7 kg)   Height: 5' 6\" (1.676 m)     Body mass index is 41.16 kg/m².   Pain Score:   2 (feet)    VS Reviewed  General Appearance: A&O x 3, No acute distress,well developed and well- nourished  Eyes: pupils equal, round, and reactive to light, extraocular eye movements intact, conjunctivae and eye lids without erythema  ENT: external ear and ear canal clear bilaterally, TMs intact and regular, nose without deformity, nasal mucosa and turbinates normal without polyps, oropharynx normal, dentition is normal for age  Neck: supple and non-tender without mass, no thyromegaly or thyroid nodules, no cervical lymphadenopathy  Pulmonary/Chest: clear to auscultation bilaterally- no wheezes, rales or rhonchi, normal air movement, no respiratory distress or retractions  Cardiovascular: S1 and S2 auscultated w/ RRR. No murmurs, rubs, clicks, or gallops, distal pulses intact. Abdomen: soft, non-tender, non-distended, bowel sounds physiologic,  no rebound or guarding, no masses or hernias noted. Liver and spleen without enlargement. Extremities: no cyanosis, clubbing or edema of the lower extremities. Skin: warm and dry, no rash or erythema  Psych: Affect appropriate. Mood euthymic. Thought process is normal without evidence of depression or psychosis. Good insight and appropriate interaction. Cognition and memory appear to be intact. Foot: Bilat 2+DP/PT bilaterally. Skin warm, dry and intact. Sensation dec throughout to touch and with monofilament. Results for POC orders placed in visit on 06/02/21   POCT glycosylated hemoglobin (Hb A1C)   Result Value Ref Range    Hemoglobin A1C 7.4 (H) 4.3 - 5.7 %       ASSESSMENT & PLAN  1. Type 2 diabetes mellitus with microalbuminuria, without long-term current use of insulin ([facility])    Stable  At goal for age  Con't metformin  Due for labs, ordered again today    - CBC Auto Differential; Future  - Comprehensive Metabolic Panel; Future  - Lipid Panel; Future  - Microalbumin / Creatinine Urine Ratio; Future  - Protein / creatinine ratio, urine; Future  - TSH with Reflex; Future  - POCT glycosylated hemoglobin (Hb A1C)  -  DIABETES FOOT EXAM    2. Mild persistent asthma without complication    Inc sxs d/t not taking meds as rx'd  Resume flovent and prn alb. When takes these, sxs controlled  Short course pred  F/u in a wk if not improving  Reviewed ER precautions, pt understands. - albuterol sulfate HFA (PROAIR HFA) 108 (90 Base) MCG/ACT inhaler; Inhale 2 puffs into the lungs every 4 hours as needed for Wheezing or Shortness of Breath  Dispense: 2 Inhaler; Refill: 3  - fluticasone (FLOVENT HFA) 110 MCG/ACT inhaler;  Inhale 1 puff into the lungs 2 times daily  Dispense: 3 Inhaler; Refill: 3  - predniSONE (DELTASONE) 20 MG tablet; Take 2 tablets by mouth daily for 5 days  Dispense: 10 tablet; Refill: 0    3. Hypertension, essential    Stable  At goal  con't norvasc, atenolol  Due for labs, ordered    - CBC Auto Differential; Future  - Comprehensive Metabolic Panel; Future  - Lipid Panel; Future  - Microalbumin / Creatinine Urine Ratio; Future  - Protein / creatinine ratio, urine; Future  - TSH with Reflex; Future    4. Gastroesophageal reflux disease, unspecified whether esophagitis present    Stable  con't PPI    5. History of CVA (cerebrovascular accident)    Not sure why on DAPT and no statin  Unable to get records  Will consider regimen adjustment next visit once I have labs, labs reordered today  con't BP control too    6. Urge urinary incontinence    con't ditropan  Stable     7. Vitamin D deficiency    Check level  Adjust treatment based on results  Lab ordered    - Vitamin D 25 Hydroxy; Future    8. Depression with anxiety    Depression stable  Anxiety worse  con't zoloft and prn atarax  Inc bupsar to 15mg bid  F/u 6 wks    - busPIRone (BUSPAR) 15 MG tablet; Take 15 mg by mouth 2 times daily  Dispense: 180 tablet; Refill: 3    9. Insomnia, unspecified type    As per # 8    10. Pain in both feet    Unclear etiology  Pretty neg exam today  Get labs  Xray  EMG  Further w/u based on results    - Vitamin B12; Future  - [facility] (EMG) - David Plascencia MD    11. Numbness and tingling of both feet    - Vitamin B12; Future  - [facility] (EMG) - David Plascencia MD    12. Cigarette nicotine dependence without complication    In precontemplation stage and not ready to quit. Declines cessation, aware of risks of continued smoking as well as resources available to help quit. These include tobacco cessation classes as well as 1-800-QUIT NOW. No barriers other than lack of desire. 3+ min spent counseling.      LDCT ordered after shared decision making    - [place] VISIT TO DISCUSS LUNG CA SCREEN W LDCT  - CT Lung Screen (Annual); Future      DISPOSITION    Return in about 6 weeks (around 7/14/2021) for f/u multiple issues, sooner as needed. Daniel Escobar released without restrictions. Future Appointments   Date Time Provider Department Center   7/15/2021  3:20 PM Kalani Hendricks, [address] received counseling on the following healthy behaviors: nutrition, exercise, medication adherence and tobacco cessation    Patient given educational materials on: See Attached    I have instructed Daniel Escobar to complete a self tracking handout on Blood Pressures  and Smoking and instructed them to bring it with them to her next appointment. Barriers to learning and self management: none    Discussed use, benefit, and side effects of prescribed medications. Barriers to medication compliance addressed. All patient questions answered. Pt voiced understanding. Low Dose CT (LDCT) Lung Screening criteria met   Age 50-69   Pack year smoking >30   Still smoking or less than 15 year since quit   No sign or symptoms of lung cancer   > 11 months since last LDCT     Risks and benefits of lung cancer screening with LDCT scans discussed:    Significance of positive screen - False-positive LDCT results often occur. 95% of all positive results do not lead to a diagnosis of cancer. Usually further imaging can resolve most false-positive results; however, some patients may require invasive procedures. Over diagnosis risk - 10% to 12% of screen-detected lung cancer cases are over diagnosed--that is, the cancer would not have been detected in the patient's lifetime without the screening.     Need for follow up screens annually to continue lung cancer screening effectiveness     Risks associated with radiation from annual LDCT- Radiation exposure is about the same as for a mammogram, which is about 1/3 of the annual background radiation exposure from everyday life. Starting screening at age 54 is not likely to increase cancer risk from radiation exposure. Patients with comorbidities resulting in life expectancy of < 10 years, or that would preclude treatment of an abnormality identified on CT, should not be screened due to lack of benefit.     To obtain maximal benefit from this screening, smoking cessation and long-term abstinence from smoking is critical      Electronically signed by Juana Chambers DO on 6/2/2021 at 11:07 AM

## 2021-06-02 ENCOUNTER — OFFICE VISIT (OUTPATIENT)
Dept: FAMILY MEDICINE CLINIC | Age: 61
End: 2021-06-02
Payer: MEDICARE

## 2021-06-02 ENCOUNTER — TELEPHONE (OUTPATIENT)
Dept: FAMILY MEDICINE CLINIC | Age: 61
End: 2021-06-02

## 2021-06-02 VITALS
WEIGHT: 255 LBS | DIASTOLIC BLOOD PRESSURE: 58 MMHG | TEMPERATURE: 98.8 F | HEART RATE: 72 BPM | HEIGHT: 66 IN | BODY MASS INDEX: 40.98 KG/M2 | OXYGEN SATURATION: 96 % | SYSTOLIC BLOOD PRESSURE: 126 MMHG | RESPIRATION RATE: 22 BRPM

## 2021-06-02 DIAGNOSIS — R80.9 TYPE 2 DIABETES MELLITUS WITH MICROALBUMINURIA, WITHOUT LONG-TERM CURRENT USE OF INSULIN (HCC): Primary | ICD-10-CM

## 2021-06-02 DIAGNOSIS — M79.672 PAIN IN BOTH FEET: ICD-10-CM

## 2021-06-02 DIAGNOSIS — I10 HYPERTENSION, ESSENTIAL: ICD-10-CM

## 2021-06-02 DIAGNOSIS — G47.00 INSOMNIA, UNSPECIFIED TYPE: ICD-10-CM

## 2021-06-02 DIAGNOSIS — N39.41 URGE URINARY INCONTINENCE: ICD-10-CM

## 2021-06-02 DIAGNOSIS — K21.9 GASTROESOPHAGEAL REFLUX DISEASE, UNSPECIFIED WHETHER ESOPHAGITIS PRESENT: ICD-10-CM

## 2021-06-02 DIAGNOSIS — M79.671 PAIN IN BOTH FEET: ICD-10-CM

## 2021-06-02 DIAGNOSIS — Z86.73 HISTORY OF CVA (CEREBROVASCULAR ACCIDENT): Chronic | ICD-10-CM

## 2021-06-02 DIAGNOSIS — R20.0 NUMBNESS AND TINGLING OF BOTH FEET: ICD-10-CM

## 2021-06-02 DIAGNOSIS — J45.30 MILD PERSISTENT ASTHMA WITHOUT COMPLICATION: ICD-10-CM

## 2021-06-02 DIAGNOSIS — E55.9 VITAMIN D DEFICIENCY: ICD-10-CM

## 2021-06-02 DIAGNOSIS — R20.2 NUMBNESS AND TINGLING OF BOTH FEET: ICD-10-CM

## 2021-06-02 DIAGNOSIS — F41.8 DEPRESSION WITH ANXIETY: ICD-10-CM

## 2021-06-02 DIAGNOSIS — E11.29 TYPE 2 DIABETES MELLITUS WITH MICROALBUMINURIA, WITHOUT LONG-TERM CURRENT USE OF INSULIN (HCC): Primary | ICD-10-CM

## 2021-06-02 DIAGNOSIS — F17.210 CIGARETTE NICOTINE DEPENDENCE WITHOUT COMPLICATION: ICD-10-CM

## 2021-06-02 LAB — HBA1C MFR BLD: 7.4 % (ref 4.3–5.7)

## 2021-06-02 PROCEDURE — 99214 OFFICE O/P EST MOD 30 MIN: CPT | Performed by: FAMILY MEDICINE

## 2021-06-02 PROCEDURE — G0296 VISIT TO DETERM LDCT ELIG: HCPCS | Performed by: FAMILY MEDICINE

## 2021-06-02 PROCEDURE — 3051F HG A1C>EQUAL 7.0%<8.0%: CPT | Performed by: FAMILY MEDICINE

## 2021-06-02 RX ORDER — FLUTICASONE PROPIONATE 110 UG/1
1 AEROSOL, METERED RESPIRATORY (INHALATION) 2 TIMES DAILY
Qty: 3 INHALER | Refills: 3 | Status: SHIPPED | OUTPATIENT
Start: 2021-06-02

## 2021-06-02 RX ORDER — ALBUTEROL SULFATE 90 UG/1
2 AEROSOL, METERED RESPIRATORY (INHALATION) EVERY 4 HOURS PRN
Qty: 2 INHALER | Refills: 3 | Status: SHIPPED | OUTPATIENT
Start: 2021-06-02

## 2021-06-02 RX ORDER — PREDNISONE 20 MG/1
40 TABLET ORAL DAILY
Qty: 10 TABLET | Refills: 0 | Status: SHIPPED | OUTPATIENT
Start: 2021-06-02 | End: 2021-06-07

## 2021-06-02 RX ORDER — BUSPIRONE HYDROCHLORIDE 15 MG/1
15 TABLET ORAL 2 TIMES DAILY
Qty: 180 TABLET | Refills: 3 | Status: SHIPPED | OUTPATIENT
Start: 2021-06-02 | End: 2021-07-15 | Stop reason: SDUPTHER

## 2021-06-08 ENCOUNTER — TELEPHONE (OUTPATIENT)
Dept: FAMILY MEDICINE CLINIC | Age: 61
End: 2021-06-08

## 2021-06-08 NOTE — TELEPHONE ENCOUNTER
2nd attempt to contact the pt re:overdue labs Dr Horn Feeling ordered on 11/5/2020. HIPAA form is up to date, order mailed.

## 2021-06-11 ENCOUNTER — HOSPITAL ENCOUNTER (OUTPATIENT)
Dept: CT IMAGING | Age: 61
Discharge: HOME | End: 2021-06-11
Payer: MEDICARE

## 2021-06-11 DIAGNOSIS — F17.210 CIGARETTE NICOTINE DEPENDENCE WITHOUT COMPLICATION: ICD-10-CM

## 2021-06-11 PROCEDURE — 71271 CT THORAX LUNG CANCER SCR C-: CPT

## 2021-06-14 ENCOUNTER — TELEPHONE (OUTPATIENT)
Dept: FAMILY MEDICINE CLINIC | Age: 61
End: 2021-06-14

## 2021-06-14 NOTE — LETTER
[address] [facility]  [address] [place] [address]  Phone: [phone]  [facility]ax: [phone]        June 16, 2021    [address] [facility] [address]  [address] [address]      Dear Luz Grant: We have made several attempts to contact you by phone and have   been unsuccessful. Please call our office at your earliest convenience  At [phone] opt 2. Thank you.       Sincerely,        Ren Root DO

## 2021-06-24 DIAGNOSIS — N39.41 URGE URINARY INCONTINENCE: Chronic | ICD-10-CM

## 2021-06-24 RX ORDER — OXYBUTYNIN CHLORIDE 10 MG/1
TABLET, EXTENDED RELEASE ORAL
Qty: 90 TABLET | Refills: 3 | Status: SHIPPED | OUTPATIENT
Start: 2021-06-24 | End: 2022-05-05 | Stop reason: SDUPTHER

## 2021-06-29 ENCOUNTER — PROCEDURE VISIT (OUTPATIENT)
Dept: NEUROLOGY | Age: 61
End: 2021-06-29
Payer: MEDICARE

## 2021-06-29 ENCOUNTER — NURSE ONLY (OUTPATIENT)
Dept: LAB | Age: 61
End: 2021-06-29

## 2021-06-29 DIAGNOSIS — R20.0 NUMBNESS AND TINGLING OF BOTH FEET: ICD-10-CM

## 2021-06-29 DIAGNOSIS — R20.0 BILATERAL LEG NUMBNESS: Primary | ICD-10-CM

## 2021-06-29 DIAGNOSIS — I10 HYPERTENSION, ESSENTIAL: ICD-10-CM

## 2021-06-29 DIAGNOSIS — R80.9 TYPE 2 DIABETES MELLITUS WITH MICROALBUMINURIA, WITHOUT LONG-TERM CURRENT USE OF INSULIN (HCC): ICD-10-CM

## 2021-06-29 DIAGNOSIS — R20.2 NUMBNESS AND TINGLING OF BOTH FEET: ICD-10-CM

## 2021-06-29 DIAGNOSIS — M79.672 PAIN IN BOTH FEET: ICD-10-CM

## 2021-06-29 DIAGNOSIS — E55.9 VITAMIN D DEFICIENCY: ICD-10-CM

## 2021-06-29 DIAGNOSIS — M79.671 PAIN IN BOTH FEET: ICD-10-CM

## 2021-06-29 DIAGNOSIS — G62.9 NEUROPATHY: ICD-10-CM

## 2021-06-29 DIAGNOSIS — E11.29 TYPE 2 DIABETES MELLITUS WITH MICROALBUMINURIA, WITHOUT LONG-TERM CURRENT USE OF INSULIN (HCC): ICD-10-CM

## 2021-06-29 DIAGNOSIS — M54.16 LUMBAR RADICULOPATHY: ICD-10-CM

## 2021-06-29 LAB
ALBUMIN SERPL-MCNC: 4.3 G/DL (ref 3.5–5.1)
ALP BLD-CCNC: 80 U/L (ref 38–126)
ALT SERPL-CCNC: 19 U/L (ref 11–66)
ANION GAP SERPL CALCULATED.3IONS-SCNC: 12 MEQ/L (ref 8–16)
AST SERPL-CCNC: 23 U/L (ref 5–40)
BASOPHILS # BLD: 0.3 %
BASOPHILS ABSOLUTE: 0 THOU/MM3 (ref 0–0.1)
BILIRUB SERPL-MCNC: 0.5 MG/DL (ref 0.3–1.2)
BUN BLDV-MCNC: 9 MG/DL (ref 7–22)
CALCIUM SERPL-MCNC: 10.4 MG/DL (ref 8.5–10.5)
CHLORIDE BLD-SCNC: 97 MEQ/L (ref 98–111)
CHOLESTEROL, TOTAL: 209 MG/DL (ref 100–199)
CO2: 26 MEQ/L (ref 23–33)
CREAT SERPL-MCNC: 0.8 MG/DL (ref 0.4–1.2)
EOSINOPHIL # BLD: 1.7 %
EOSINOPHILS ABSOLUTE: 0.2 THOU/MM3 (ref 0–0.4)
ERYTHROCYTE [DISTWIDTH] IN BLOOD BY AUTOMATED COUNT: 14.5 % (ref 11.5–14.5)
ERYTHROCYTE [DISTWIDTH] IN BLOOD BY AUTOMATED COUNT: 50 FL (ref 35–45)
GFR SERPL CREATININE-BSD FRML MDRD: 73 ML/MIN/1.73M2
GLUCOSE BLD-MCNC: 171 MG/DL (ref 70–108)
HCT VFR BLD CALC: 45 % (ref 37–47)
HDLC SERPL-MCNC: 30 MG/DL
HEMOGLOBIN: 14.2 GM/DL (ref 12–16)
IMMATURE GRANS (ABS): 0.02 THOU/MM3 (ref 0–0.07)
IMMATURE GRANULOCYTES: 0.2 %
LDL CHOLESTEROL CALCULATED: 121 MG/DL
LYMPHOCYTES # BLD: 30.8 %
LYMPHOCYTES ABSOLUTE: 2.9 THOU/MM3 (ref 1–4.8)
MCH RBC QN AUTO: 29.5 PG (ref 26–33)
MCHC RBC AUTO-ENTMCNC: 31.6 GM/DL (ref 32.2–35.5)
MCV RBC AUTO: 93.6 FL (ref 81–99)
MONOCYTES # BLD: 5.3 %
MONOCYTES ABSOLUTE: 0.5 THOU/MM3 (ref 0.4–1.3)
NUCLEATED RED BLOOD CELLS: 0 /100 WBC
PLATELET # BLD: 283 THOU/MM3 (ref 130–400)
PMV BLD AUTO: 10.4 FL (ref 9.4–12.4)
POTASSIUM SERPL-SCNC: 4.4 MEQ/L (ref 3.5–5.2)
RBC # BLD: 4.81 MILL/MM3 (ref 4.2–5.4)
SEG NEUTROPHILS: 61.7 %
SEGMENTED NEUTROPHILS ABSOLUTE COUNT: 5.8 THOU/MM3 (ref 1.8–7.7)
SODIUM BLD-SCNC: 135 MEQ/L (ref 135–145)
TOTAL PROTEIN: 7.1 G/DL (ref 6.1–8)
TRIGL SERPL-MCNC: 288 MG/DL (ref 0–199)
TSH SERPL DL<=0.05 MIU/L-ACNC: 4 UIU/ML (ref 0.4–4.2)
VITAMIN B-12: 443 PG/ML (ref 211–911)
VITAMIN D 25-HYDROXY: 89 NG/ML (ref 30–100)
WBC # BLD: 9.4 THOU/MM3 (ref 4.8–10.8)

## 2021-06-29 PROCEDURE — 95886 MUSC TEST DONE W/N TEST COMP: CPT | Performed by: PSYCHIATRY & NEUROLOGY

## 2021-06-29 PROCEDURE — 95910 NRV CNDJ TEST 7-8 STUDIES: CPT | Performed by: PSYCHIATRY & NEUROLOGY

## 2021-07-01 ENCOUNTER — TELEPHONE (OUTPATIENT)
Dept: FAMILY MEDICINE CLINIC | Age: 61
End: 2021-07-01

## 2021-07-01 DIAGNOSIS — I10 HYPERTENSION, ESSENTIAL: ICD-10-CM

## 2021-07-01 DIAGNOSIS — N39.41 URGE URINARY INCONTINENCE: Chronic | ICD-10-CM

## 2021-07-01 RX ORDER — OXYBUTYNIN CHLORIDE 10 MG/1
TABLET, EXTENDED RELEASE ORAL
Qty: 30 TABLET | Refills: 11 | OUTPATIENT
Start: 2021-07-01

## 2021-07-01 RX ORDER — ATENOLOL 25 MG/1
TABLET ORAL
Qty: 90 TABLET | Refills: 3 | Status: SHIPPED | OUTPATIENT
Start: 2021-07-01 | End: 2022-05-23

## 2021-07-01 NOTE — TELEPHONE ENCOUNTER
----- Message from Claude Pilot, DO sent at 6/30/2021  6:37 PM EDT -----  Please let pt know that not all labs are back but most are  What is back is stable and appropriate. Will call with rest of results once available. Let me know if questions, thanks!

## 2021-07-02 ENCOUNTER — HOSPITAL ENCOUNTER (OUTPATIENT)
Age: 61
Discharge: HOME | End: 2021-07-02
Payer: MEDICARE

## 2021-07-02 ENCOUNTER — HOSPITAL ENCOUNTER (OUTPATIENT)
Dept: GENERAL RADIOLOGY | Age: 61
Discharge: HOME | End: 2021-07-02
Payer: MEDICARE

## 2021-07-02 DIAGNOSIS — M79.672 PAIN IN BOTH FEET: ICD-10-CM

## 2021-07-02 DIAGNOSIS — M79.671 PAIN IN BOTH FEET: ICD-10-CM

## 2021-07-02 DIAGNOSIS — R20.2 NUMBNESS AND TINGLING OF BOTH FEET: ICD-10-CM

## 2021-07-02 DIAGNOSIS — R20.0 NUMBNESS AND TINGLING OF BOTH FEET: ICD-10-CM

## 2021-07-02 LAB
CREATININE URINE: 144.3 MG/DL
MICROALBUMIN UR-MCNC: 3.96 MG/DL
PROT/CREAT RATIO, UR: 0.16
PROTEIN, URINE: 23.3 MG/DL

## 2021-07-02 PROCEDURE — 73630 X-RAY EXAM OF FOOT: CPT

## 2021-07-06 ENCOUNTER — TELEPHONE (OUTPATIENT)
Dept: FAMILY MEDICINE CLINIC | Age: 61
End: 2021-07-06

## 2021-07-06 NOTE — LETTER
[address] [facility]  [address] [facility] [facility] [address]  Phone: [phone]  Fax: [phone]    Dr. Jimbo Sawyer       July 9, 2021    [address] [address]      Dear Lakesha Meyer: We have made several attempts to contact you by phone and have   been unsuccessful. Please call our office at your earliest convenience  At [phone] opt 2. Thank you. If you have any questions or concerns, please don't hesitate to call.     Sincerely,

## 2021-07-15 ENCOUNTER — OFFICE VISIT (OUTPATIENT)
Dept: FAMILY MEDICINE CLINIC | Age: 61
End: 2021-07-15
Payer: MEDICARE

## 2021-07-15 VITALS
SYSTOLIC BLOOD PRESSURE: 138 MMHG | BODY MASS INDEX: 40.18 KG/M2 | HEART RATE: 72 BPM | RESPIRATION RATE: 16 BRPM | DIASTOLIC BLOOD PRESSURE: 64 MMHG | TEMPERATURE: 98.6 F | HEIGHT: 66 IN | OXYGEN SATURATION: 95 % | WEIGHT: 250 LBS

## 2021-07-15 DIAGNOSIS — F17.210 CIGARETTE NICOTINE DEPENDENCE WITHOUT COMPLICATION: ICD-10-CM

## 2021-07-15 DIAGNOSIS — G47.00 INSOMNIA, UNSPECIFIED TYPE: ICD-10-CM

## 2021-07-15 DIAGNOSIS — J45.30 MILD PERSISTENT ASTHMA WITHOUT COMPLICATION: Primary | ICD-10-CM

## 2021-07-15 DIAGNOSIS — E78.5 DYSLIPIDEMIA: Chronic | ICD-10-CM

## 2021-07-15 DIAGNOSIS — F41.8 DEPRESSION WITH ANXIETY: ICD-10-CM

## 2021-07-15 DIAGNOSIS — M79.671 PAIN IN BOTH FEET: ICD-10-CM

## 2021-07-15 DIAGNOSIS — R20.0 NUMBNESS AND TINGLING OF BOTH FEET: ICD-10-CM

## 2021-07-15 DIAGNOSIS — R20.2 NUMBNESS AND TINGLING OF BOTH FEET: ICD-10-CM

## 2021-07-15 DIAGNOSIS — M79.672 PAIN IN BOTH FEET: ICD-10-CM

## 2021-07-15 PROCEDURE — 99214 OFFICE O/P EST MOD 30 MIN: CPT | Performed by: FAMILY MEDICINE

## 2021-07-15 RX ORDER — BUSPIRONE HYDROCHLORIDE 15 MG/1
15 TABLET ORAL 2 TIMES DAILY
Qty: 180 TABLET | Refills: 3 | Status: SHIPPED | OUTPATIENT
Start: 2021-07-15 | End: 2021-12-22 | Stop reason: SDUPTHER

## 2021-07-15 RX ORDER — ATORVASTATIN CALCIUM 40 MG/1
40 TABLET, FILM COATED ORAL DAILY
Qty: 90 TABLET | Refills: 3 | Status: SHIPPED | OUTPATIENT
Start: 2021-07-15 | End: 2022-07-05

## 2021-07-15 NOTE — PATIENT INSTRUCTIONS
-Call Dr. Scott Kirby office to set up your f/u Colonoscopy. -LAB INSTRUCTIONS:    Please complete labs IN 6 week(s). Please fast for 8 hours prior to lab collection. The clinic will call you within 1 week of collection. If you have not heard from us within that amount of time, please call us at [phone].

## 2021-07-15 NOTE — PROGRESS NOTES
Chief Complaint   Patient presents with    Follow-up     Asthma, anxiety, feet, labs, smoking       History obtained from the patient. SUBJECTIVE:  Princess Lopez is a 62 y.o. female that presents today for     -Asthma LAST VISIT:  Asthma worse, but hasn't been using her inhalers at all  Needs new rx    History of asthma?: Yes  Current medication regimen -     Frequency of day symptoms? A few times wk if that when takes flovent as rx'd. Right now using every day  Frequency of night time Sx?  never    Chronic cough?: no  Chest pain/Tightness?:  no  Shortness of breath?: no  Wheezing?  no    Last PFTs - SEPT 2019    Known triggers?   Yes  Hospitalized and/or intubated in the past?: No  Smoker or smoke exposure in the home?: Yes  Number of times prescribed oral steroids in the past year - 1    UPDATE TODAY:   Breathing doing much better now that taking meds again  Not needing rescue inhaler nearly as often now that taking flovent again  Denies cough/wheezing or SOB         -Depression/anxiety LAST VISIT:  On zoloft and buspar and prn atarax  Anxiety worse over the last few months  ataralx helps, on zoloft 100mg  Denies SI/HI    UPDATE TODAY:   No change in above  Had buspar dose inc to 15mg bid last visit  However, she did not pick it up, needs it sent in again       -Foot pain/numbness PRIOR VISIT:  Getting recurrent burning pain in feet  Feel numb at times  Worse at night  Going on about 6+ months  R foot worse than L    UPDATE LAST VISIT:   No change in above  Did not do labs, xray or EMG     UPDATE TODAY:   No change in above  xrays done, showed some OA  EMG done, results pending       -HLD:  High on labs  Hx of cva and DM2  Willing to start lipitor.       -Smoking PRIOR VISIT:  Wants to quit  Would like to try chantix    UPDATE PRIOR VISIT:   Has chantix  Hasn't started yet  Hasn't picked quit date  Encouraged to do so     UPDATE PRIOR VISIT:   1/2 PPD yet  Tried chantix, but didn't help  Not ready to quit.      UPDATE TODAY:   Still smoking  About 3/4 to full PPD  Not ready to quit      Age/Gender Health Maintenance    Lipid -   Lab Results   Component Value Date    CHOL 209 (H) 06/29/2021    CHOL 200 (H) 01/08/2019    CHOL 207 (H) 09/21/2016     Lab Results   Component Value Date    TRIG 288 (H) 06/29/2021    TRIG 287 (H) 01/08/2019    TRIG 178 09/21/2016     Lab Results   Component Value Date    HDL 30 06/29/2021    HDL 34 01/08/2019    HDL 40 09/21/2016     Lab Results   Component Value Date    LDLCALC 121 06/29/2021    LDLCALC 109 01/08/2019    [address] 09/21/2016     No results found for: LABVLDL, VLDL  No results found for: CHOLHDLRATIO      TSH -   Lab Results   Component Value Date    TSH 4.000 06/29/2021       Colon Cancer Screening - + multiple polyps DEC 2016, repeat 3 years per Jennifer SEBASTIAN.  Pt to call to schedule (June 2021/july 2021)  Lung Cancer Screening (Age 54 to [de-identified] with 30 pack year hx, current smoker or quit within past 15 years) - LUNGRADS 3 June 2021, repeat 6 months    Tetanus - to get at pharmacy per medicare rules  Influenza Vaccine - UTD FALL 2020  Pneumonia Vaccine - UTD NOV 2016, PPV 23  Zoster - to get at pharmacy per medicare rules     Breast Cancer Screening - NEG MAY 2021  Cervical Cancer Screening - disucss next visit  Osteoporosis Screening - age 73      Diabetes Health Maintenance    A1C -   Lab Results   Component Value Date    LABA1C 7.4 06/02/2021    LABA1C 7.2 11/05/2020    LABA1C 6.8 08/27/2019     ACE/ARB - yes, benazapril  Eye - records pending  Foot - UTD NOV 2020  ASA - yes    Microal/Cr -   Component      Latest Ref Rng & Units 6/29/2021          10:01 AM   Protein, Urine      mg/dl 23.3   Creatinine, Urine      mg/dl 144.3   Prot/Creat Ratio, Ur       0.16     Lab Results   Component Value Date    LABMICR 3.96 06/29/2021    LABCREA 233.2 01/08/2019    MACRR 232 (H) 01/08/2019       eGFR -   No results found for: GFRESTIMATE  Lab Results   Component Value Date LABGLOM 73 06/29/2021       Statin - yes, lipitor, 40 mg qhs      Current Outpatient Medications   Medication Sig Dispense Refill    busPIRone (BUSPAR) 15 MG tablet Take 15 mg by mouth 2 times daily 180 tablet 3    atorvastatin (LIPITOR) 40 MG tablet Take 1 tablet by mouth daily 90 tablet 3    atenolol (TENORMIN) 25 MG tablet TAKE 1 TABLET BY MOUTH ONCE DAILY 90 tablet 3    oxybutynin (DITROPAN-XL) 10 MG extended release tablet TAKE 1 TABLET BY MOUTH ONCE DAILY 90 tablet 3    albuterol sulfate HFA (PROAIR HFA) 108 (90 Base) MCG/ACT inhaler Inhale 2 puffs into the lungs every 4 hours as needed for Wheezing or Shortness of Breath 2 Inhaler 3    fluticasone (FLOVENT HFA) 110 MCG/ACT inhaler Inhale 1 puff into the lungs 2 times daily 3 Inhaler 3    amLODIPine-benazepril (LOTREL) 10-20 MG per capsule TAKE 1 CAPSULE BY MOUTH ONCE DAILY 90 capsule 3    clopidogrel (PLAVIX) 75 MG tablet Take 1 tablet by mouth daily 90 tablet 3    metFORMIN (GLUCOPHAGE) 850 MG tablet Take 1 tablet by mouth 2 times daily (with meals) 180 tablet 3    omega-3 acid ethyl esters (LOVAZA) 1 g capsule       Blood Glucose Monitoring Suppl KIT Use As Directed 1 kit 0    blood glucose monitor strips Use to check sugars twice daily 300 strip 3    Lancets MISC Use to check sugars twice daily 300 each 3    hydrOXYzine (ATARAX) 25 MG tablet Take 1 tablet by mouth nightly as needed for Anxiety 90 tablet 2    albuterol sulfate  (90 Base) MCG/ACT inhaler Inhale 2 puffs into the lungs every 6 hours as needed for Wheezing      lansoprazole (PREVACID SOLUTAB) 30 MG disintegrating tablet Take 30 mg by mouth daily      vitamin D (CHOLECALCIFEROL) 1000 UNITS TABS tablet Take 2,000 Units by mouth daily       sertraline (ZOLOFT) 100 MG tablet Take 100 mg by mouth daily.  aspirin 325 MG tablet Take 325 mg by mouth daily. No current facility-administered medications for this visit.      Orders Placed This Encounter   Medications Alcohol use: No       Family History   Problem Relation Age of Onset    Diabetes Mother    Magi Sanabria Glaucoma Mother     Heart Attack Father     Diabetes Sister     Breast Cancer Sister     Diabetes Brother     Colon Polyps Brother 52    Colon Polyps Sister 54    Colon Cancer Neg Hx          I have reviewed the patient's past medical history, past surgical history, allergies, medications, social and family history and I have made updates where appropriate. PHYSICAL EXAM:  Vitals:    07/15/21 1528   BP: 138/64   Pulse: 72   Resp: 16   Temp: 98.6 °F (37 °C)   TempSrc: Oral   SpO2: 95%   Weight: 250 lb (113.4 kg)   Height: 5' 6\" (1.676 m)     Body mass index is 40.35 kg/m². Pain Score:   3 (feet)    VS Reviewed  General Appearance: A&O x 3, No acute distress,well developed and well- nourished  Eyes: pupils equal, round, and reactive to light, extraocular eye movements intact, conjunctivae and eye lids without erythema  ENT: external ear and ear canal clear bilaterally, TMs intact and regular, nose without deformity, nasal mucosa and turbinates normal without polyps, oropharynx normal, dentition is normal for age  Neck: supple and non-tender without mass, no thyromegaly or thyroid nodules, no cervical lymphadenopathy  Pulmonary/Chest: clear to auscultation bilaterally- no wheezes, rales or rhonchi, normal air movement, no respiratory distress or retractions  Cardiovascular: S1 and S2 auscultated w/ RRR. No murmurs, rubs, clicks, or gallops, distal pulses intact. Abdomen: soft, non-tender, non-distended, bowel sounds physiologic,  no rebound or guarding, no masses or hernias noted. Liver and spleen without enlargement. Extremities: no cyanosis, clubbing or edema of the lower extremities. Skin: warm and dry, no rash or erythema  Psych: Affect appropriate. Mood euthymic. Thought process is normal without evidence of depression or psychosis. Good insight and appropriate interaction.   Cognition and memory appear to be intact.       Nurse Only on 06/29/2021   Component Date Value Ref Range Status    Vitamin B-12 06/29/2021 443  211 - 911 pg/mL Final    Vit D, 25-Hydroxy 06/29/2021 89  30 - 100 ng/ml Final    TSH 06/29/2021 4.000  0.400 - 4.200 uIU/mL Final    Protein, Urine 06/29/2021 23.3  mg/dl Final    Creatinine, Urine 06/29/2021 144.3  mg/dl Final    Prot/Creat Ratio, Ur 06/29/2021 0.16   Final    Microalbumin, Random Urine 06/29/2021 3.96  mg/dL Final    Cholesterol, Total 06/29/2021 209* 100 - 199 mg/dL Final    Triglycerides 06/29/2021 288* 0 - 199 mg/dL Final    HDL 06/29/2021 30  mg/dL Final    LDL Calculated 06/29/2021 121  mg/dL Final    Glucose 06/29/2021 171* 70 - 108 mg/dL Final    CREATININE 06/29/2021 0.8  0.4 - 1.2 mg/dL Final    BUN 06/29/2021 9  7 - 22 mg/dL Final    Sodium 06/29/2021 135  135 - 145 meq/L Final    Potassium 06/29/2021 4.4  3.5 - 5.2 meq/L Final    Chloride 06/29/2021 97* 98 - 111 meq/L Final    CO2 06/29/2021 26  23 - 33 meq/L Final    Calcium 06/29/2021 10.4  8.5 - 10.5 mg/dL Final    AST 06/29/2021 23  5 - 40 U/L Final    Alkaline Phosphatase 06/29/2021 80  38 - 126 U/L Final    Total Protein 06/29/2021 7.1  6.1 - 8.0 g/dL Final    Albumin 06/29/2021 4.3  3.5 - 5.1 g/dL Final    Total Bilirubin 06/29/2021 0.5  0.3 - 1.2 mg/dL Final    ALT 06/29/2021 19  11 - 66 U/L Final    WBC 06/29/2021 9.4  4.8 - 10.8 thou/mm3 Final    RBC 06/29/2021 4.81  4.20 - 5.40 mill/mm3 Final    Hemoglobin 06/29/2021 14.2  12.0 - 16.0 gm/dl Final    Hematocrit 06/29/2021 45.0  37.0 - 47.0 % Final    MCV 06/29/2021 93.6  81.0 - 99.0 fL Final    MCH 06/29/2021 29.5  26.0 - 33.0 pg Final    MCHC 06/29/2021 31.6* 32.2 - 35.5 gm/dl Final    RDW-CV 06/29/2021 14.5  11.5 - 14.5 % Final    RDW-SD 06/29/2021 50.0* 35.0 - 45.0 fL Final    Platelets 76/82/0344 283  130 - 400 thou/mm3 Final    MPV 06/29/2021 10.4  9.4 - 12.4 fL Final    Seg Neutrophils 06/29/2021 61.7  % Final    Lymphocytes 06/29/2021 30.8  % Final    Monocytes 06/29/2021 5.3  % Final    Eosinophils 06/29/2021 1.7  % Final    Basophils 06/29/2021 0.3  % Final    Immature Granulocytes 06/29/2021 0.2  % Final    Segs Absolute 06/29/2021 5.8  1 - 7 thou/mm3 Final    Lymphocytes Absolute 06/29/2021 2.9  1.0 - 4.8 thou/mm3 Final    Monocytes Absolute 06/29/2021 0.5  0.4 - 1.3 thou/mm3 Final    Eosinophils Absolute 06/29/2021 0.2  0.0 - 0.4 thou/mm3 Final    Basophils Absolute 06/29/2021 0.0  0.0 - 0.1 thou/mm3 Final    Immature Grans (Abs) 06/29/2021 0.02  0.00 - 0.07 thou/mm3 Final    nRBC 06/29/2021 0  /100 wbc Final    Anion Gap 06/29/2021 12.0  8.0 - 16.0 meq/L Final    Est, Glom Filt Rate 06/29/2021 73* ml/min/1.73m2 Final       Narrative   PROCEDURE: XR FOOT RIGHT (MIN 3 VIEWS)       CLINICAL INFORMATION: Numbness and tingling of both feet, Numbness and tingling of both feet, Pain in both feet, Pain in both feet .       COMPARISON: 6/8/2015       TECHNIQUE: 4 projection       FINDINGS: Very similar to the prior examination. There is flexion deformity of the forefoot. This limits evaluation. There is a remote fusion of the first interphalangeal joint. There is no acute fracture or bone destruction is seen. Enthesophytes at the    insertion of the Achilles tendon and the plantar fascia. Bone density is diminished.           Impression   Stable appearance of the foot. Multiple chronic findings detailed in the above report               **This report has been created using voice recognition software.  It may contain minor errors which are inherent in voice recognition technology. **       Final report electronically signed by Dr. Kamlesh Ely on 7/2/2021 3:36 PM       Narrative   PROCEDURE: XR FOOT LEFT (MIN 3 VIEWS)       CLINICAL INFORMATION: Numbness and tingling of both feet, Numbness and tingling of both feet, Pain in both feet, Pain in both feet .       COMPARISON: First digit exam dated 1/20/2011     TECHNIQUE: 4 projections       FINDINGS: As on the right foot there is flexion deformity of the forefoot. Somewhat limiting the evaluation. No acute fracture or dislocation is seen. Prominent calcaneal spurring with large enthesophyte at the insertion of the plantar fascial small at    the insertion of the Achilles tendon. Bone density is diminished. No soft tissue abnormality is seen.           Impression   Chronic changes as detailed above. Similar to the right foot.               **This report has been created using voice recognition software.  It may contain minor errors which are inherent in voice recognition technology. **       Final report electronically signed by Dr. Sainz Given on 7/2/2021 3:37 PM       ASSESSMENT & PLAN  1. Mild persistent asthma without complication    Sig improved  Back on flovent, con't this  con't prn alb  AAP reviewed    2. Depression with anxiety    No better no worse, as was not able to  inc dose of buspar  Re-written today  con't zoloft and prn atarax  Will call her in 6 wks and see how anxiety is doing    - busPIRone (BUSPAR) 15 MG tablet; Take 15 mg by mouth 2 times daily  Dispense: 180 tablet; Refill: 3    3. Insomnia, unspecified type    As above    4. Pain in both feet    W/u on going  Await EMG results  Will f/u with her based on those  Xray results reviewed with pt    5. Numbness and tingling of both feet      6. Dyslipidemia    Start lipitor  Labs in 6 wks    - atorvastatin (LIPITOR) 40 MG tablet; Take 1 tablet by mouth daily  Dispense: 90 tablet; Refill: 3  - Lipid Panel; Future    7. Cigarette nicotine dependence without complication    In precontemplation stage and not ready to quit. Declines cessation, aware of risks of continued smoking as well as resources available to help quit. These include tobacco cessation classes as well as 1-800-QUIT NOW. No barriers other than lack of desire. 3+ min spent counseling.        DISPOSITION    Return in about 5 months (around 12/3/2021) for AWV, Follow-up Diabetes, follow-up on chronic medical conditions, sooner as needed. Chase Moser released without restrictions. Future Appointments   Date Time Provider Demi Farley   12/15/2021 10:40 AM Mian Abdi DO [facility]     PATIENT COUNSELING    Barriers to learning and self management: none    Discussed use, benefit, and side effects of prescribed medications. Barriers to medication compliance addressed. All patient questions answered. Pt voiced understanding.        Electronically signed by Mian Abdi DO on 7/15/2021 at 3:45 PM

## 2021-07-17 ENCOUNTER — HOSPITAL ENCOUNTER (EMERGENCY)
Age: 61
Discharge: HOME | End: 2021-07-17
Attending: EMERGENCY MEDICINE
Payer: MEDICARE

## 2021-07-17 VITALS
BODY MASS INDEX: 40.18 KG/M2 | HEART RATE: 62 BPM | WEIGHT: 250 LBS | HEIGHT: 66 IN | RESPIRATION RATE: 18 BRPM | OXYGEN SATURATION: 95 % | TEMPERATURE: 97.8 F | DIASTOLIC BLOOD PRESSURE: 75 MMHG | SYSTOLIC BLOOD PRESSURE: 160 MMHG

## 2021-07-17 DIAGNOSIS — F41.1 ANXIETY STATE: ICD-10-CM

## 2021-07-17 DIAGNOSIS — T50.901A ACCIDENTAL OVERDOSE, INITIAL ENCOUNTER: Primary | ICD-10-CM

## 2021-07-17 PROCEDURE — 99283 EMERGENCY DEPT VISIT LOW MDM: CPT

## 2021-07-17 ASSESSMENT — ENCOUNTER SYMPTOMS
PHOTOPHOBIA: 0
COUGH: 0
DIARRHEA: 0
CHEST TIGHTNESS: 0
ABDOMINAL PAIN: 0
STRIDOR: 0
SORE THROAT: 0
EYE PAIN: 0
ABDOMINAL DISTENTION: 0
BACK PAIN: 0
EYE ITCHING: 0
EYE REDNESS: 0
RHINORRHEA: 0
CONSTIPATION: 0
VOMITING: 0
NAUSEA: 0
SHORTNESS OF BREATH: 0
WHEEZING: 0
EYE DISCHARGE: 0

## 2021-07-17 NOTE — ED TRIAGE NOTES
Patient took her dose of Metformin 850 mg and  Buspar 15 mg at 10 pm and then took another dose at 1030 by accident because she couldn't remember taking

## 2021-07-17 NOTE — ED PROVIDER NOTES
[address] ENCOUNTER      PATIENT NAME: Ray Valle  MRN: [id number]  : 1960  GAR: 2021  PROVIDER: Diana Lindo MD      CHIEF COMPLAINT       Chief Complaint   Patient presents with    Drug Overdose     took extra dose of metformin       Patient is seen and evaluated in a timely fashion. Nurses Notes are reviewed and I agree except as noted in the HPI. HISTORY OF PRESENT ILLNESS    Ray Valle is a 62 y.o. female who presents to Emergency Department with Drug Overdose (took extra dose of metformin)     Patient presents to ED because she took an extra dosage of BuSpar and Metformin tonight around 10 PM.  She took an extra dose of Metformin 850 mg and BuSpar 15 mg. She seems anxious and she complains of mild dizziness. She is alert and oriented x 4. No chest pain. No SOB. No confusion. No weakness. This is an accidental overdose no suicidal homicidal ideation or plan. This HPI was provided by patient. REVIEW OF SYSTEMS   Review of Systems   Constitutional: Negative for activity change, appetite change, chills, fatigue, fever and unexpected weight change. HENT: Negative for congestion, ear discharge, ear pain, hearing loss, nosebleeds, rhinorrhea and sore throat. Eyes: Negative for photophobia, pain, discharge, redness and itching. Respiratory: Negative for cough, chest tightness, shortness of breath, wheezing and stridor. Cardiovascular: Negative for chest pain, palpitations and leg swelling. Gastrointestinal: Negative for abdominal distention, abdominal pain, constipation, diarrhea, nausea and vomiting. Endocrine: Negative for cold intolerance, heat intolerance, polydipsia and polyphagia. Genitourinary: Negative for dysuria, flank pain, frequency and hematuria. Musculoskeletal: Negative for arthralgias, back pain, gait problem, myalgias, neck pain and neck stiffness. Skin: Negative for pallor, rash and wound. Allergic/Immunologic: Negative for environmental allergies and food allergies. Neurological: Positive for dizziness. Negative for tremors, syncope, weakness and headaches. Psychiatric/Behavioral: Negative for agitation, behavioral problems, confusion, self-injury, sleep disturbance and suicidal ideas. The patient is nervous/anxious. PAST MEDICAL HISTORY     Past Medical History:   Diagnosis Date    Asthma, mild persistent     Depression with anxiety     DM2 (diabetes mellitus, type 2) ([facility] [address]     Dyslipidemia     GERD (gastroesophageal reflux disease)     Glaucoma     History of CVA (cerebrovascular accident)     x 2 per pt; last was ~2010    Hypertension, essential     Microalbuminuria due to type 2 diabetes mellitus ([facility] [address]     Nicotine dependence     Umbilical hernia     as a complication  after cholecystectomy per pt    Urge urinary incontinence        SURGICAL HISTORY       Past Surgical History:   Procedure Laterality Date    CATARACT REMOVAL WITH IMPLANT Bilateral     CHOLECYSTECTOMY      COLONOSCOPY  2014, 2016    FOOT SURGERY Right     10/17/13, hardware placed and removed    TONSILLECTOMY      UPPER GASTROINTESTINAL ENDOSCOPY  2014, 2016       CURRENT MEDICATIONS       Previous Medications    ALBUTEROL SULFATE HFA (PROAIR HFA) 108 (90 BASE) MCG/ACT INHALER    Inhale 2 puffs into the lungs every 4 hours as needed for Wheezing or Shortness of Breath    ALBUTEROL SULFATE  (90 BASE) MCG/ACT INHALER    Inhale 2 puffs into the lungs every 6 hours as needed for Wheezing    AMLODIPINE-BENAZEPRIL (LOTREL) 10-20 MG PER CAPSULE    TAKE 1 CAPSULE BY MOUTH ONCE DAILY    ASPIRIN 325 MG TABLET    Take 325 mg by mouth daily.       ATENOLOL (TENORMIN) 25 MG TABLET    TAKE 1 TABLET BY MOUTH ONCE DAILY    ATORVASTATIN (LIPITOR) 40 MG TABLET    Take 1 tablet by mouth daily    BLOOD GLUCOSE MONITOR STRIPS    Use to check sugars twice daily    BLOOD GLUCOSE MONITORING SUPPL KIT    Use As Directed    BUSPIRONE (BUSPAR) 15 MG TABLET    Take 15 mg by mouth 2 times daily    CLOPIDOGREL (PLAVIX) 75 MG TABLET    Take 1 tablet by mouth daily    FLUTICASONE (FLOVENT HFA) 110 MCG/ACT INHALER    Inhale 1 puff into the lungs 2 times daily    HYDROXYZINE (ATARAX) 25 MG TABLET    Take 1 tablet by mouth nightly as needed for Anxiety    LANCETS MISC    Use to check sugars twice daily    LANSOPRAZOLE (PREVACID SOLUTAB) 30 MG DISINTEGRATING TABLET    Take 30 mg by mouth daily    METFORMIN (GLUCOPHAGE) 850 MG TABLET    Take 1 tablet by mouth 2 times daily (with meals)    OMEGA-3 ACID ETHYL ESTERS (LOVAZA) 1 G CAPSULE        OXYBUTYNIN (DITROPAN-XL) 10 MG EXTENDED RELEASE TABLET    TAKE 1 TABLET BY MOUTH ONCE DAILY    SERTRALINE (ZOLOFT) 100 MG TABLET    Take 100 mg by mouth daily. VITAMIN D (CHOLECALCIFEROL) 1000 UNITS TABS TABLET    Take 2,000 Units by mouth daily        ALLERGIES     Latex    FAMILY HISTORY     She indicated that her mother is . She indicated that her father is . She indicated that two of her three sisters are . She indicated that the status of her brother is unknown. She indicated that the status of her neg hx is unknown.   family history includes Breast Cancer in her sister; Colon Polyps (age of onset: 52) in her brother; Colon Polyps (age of onset: 54) in her sister; Diabetes in her brother, mother, and sister; Glaucoma in her mother; Heart Attack in her father. SOCIAL HISTORY      reports that she has been smoking. She has a 33.00 pack-year smoking history. She has never used smokeless tobacco. She reports that she does not drink alcohol and does not use drugs. PHYSICAL EXAM      height is 5' 6\" (1.676 m) and weight is 250 lb (113.4 kg). Her blood pressure is 160/75 (abnormal) and her pulse is 62. Her respiration is 18 and oxygen saturation is 95%. Physical Exam  Vitals and nursing note reviewed. Constitutional:       Appearance: She is well-developed.  She is not diaphoretic. HENT:      Head: Normocephalic and atraumatic. Nose: Nose normal.   Eyes:      General: No scleral icterus. Right eye: No discharge. Left eye: No discharge. Conjunctiva/sclera: Conjunctivae normal.      Pupils: Pupils are equal, round, and reactive to light. Neck:      Vascular: No JVD. Trachea: No tracheal deviation. Cardiovascular:      Rate and Rhythm: Normal rate and regular rhythm. Heart sounds: Normal heart sounds. No murmur heard. No friction rub. No gallop. Pulmonary:      Effort: Pulmonary effort is normal. No respiratory distress. Breath sounds: Normal breath sounds. No stridor. No wheezing or rales. Chest:      Chest wall: No tenderness. Abdominal:      General: Bowel sounds are normal. There is no distension. Palpations: Abdomen is soft. There is no mass. Tenderness: There is no abdominal tenderness. There is no guarding or rebound. Hernia: No hernia is present. Musculoskeletal:         General: No tenderness or deformity. Cervical back: Normal range of motion and neck supple. Lymphadenopathy:      Cervical: No cervical adenopathy. Skin:     General: Skin is warm and dry. Capillary Refill: Capillary refill takes less than 2 seconds. Coloration: Skin is not pale. Findings: No erythema or rash. Neurological:      Mental Status: She is alert and oriented to person, place, and time. Cranial Nerves: No cranial nerve deficit. Sensory: No sensory deficit. Motor: No abnormal muscle tone. Coordination: Coordination normal.      Deep Tendon Reflexes: Reflexes normal.   Psychiatric:         Behavior: Behavior normal.         Thought Content: Thought content normal.         Judgment: Judgment normal.         ANCILLARY TEST RESULTS   EKG: Interpreted by me  Not indicated    LAB RESULTS:  No results found for this visit on 07/17/21.     RADIOLOGY REPORTS  No orders to display MEDICAL DEDISION MAKINGS AND RATIONALES     Differential diagnsis: Accidental overdose, anxiety    Actions: None    ED Vitals:  Vitals:    07/17/21 0014   BP: (!) 160/75   Pulse: 62   Resp: 18   TempSrc: Oral   SpO2: 95%   Weight: 250 lb (113.4 kg)   Height: 5' 6\" (1.676 m)       Patient has stable vital signs. Normal physical exam.  It has been 2.5 hours after she had the accidental overdose. The extra dosage of Metformin and BuSpar she took are far from causing toxicity. Patient is reassured and discharged with PCP follow-up as scheduled. CRITICAL CARE   None    CONSULTS   None    PROCEDURES   None    FINAL IMPRESSION AND DISPOSITION      1. Accidental overdose, initial encounter    2.  Anxiety state        Discharge home    PATIENT REFERRED TO:  [address]  [address] [address] [phone]    In 3 days  ED discharge follow-up      DISCHARGE MEDICATIONS:  New Prescriptions    No medications on file       (Please note that portions of this note were completed with a voice recognition program.  Efforts were made to edit the dictations but occasionally words aremis-transcribed.)    MD Jonathan Hernandez MD  07/17/21 [id number]

## 2021-08-30 ENCOUNTER — TELEPHONE (OUTPATIENT)
Dept: FAMILY MEDICINE CLINIC | Age: 61
End: 2021-08-30

## 2021-08-30 NOTE — TELEPHONE ENCOUNTER
----- Message from Kayleen Pineda DO sent at 8/27/2021  6:20 AM EDT -----  Please call pt and see how anxiety doing on inc dose of buspar   Let me know, thanks!

## 2021-08-30 NOTE — TELEPHONE ENCOUNTER
Ok  Give the med another 4 wks  F/u in office if still having issues at that point  Let me know if questions, thanks!

## 2021-10-25 NOTE — PROGRESS NOTES
Chief Complaint   Patient presents with    Hip Pain     left, about 2 weeks     Asthma       History obtained from the patient. SUBJECTIVE:  Kurt Gill is a 62 y.o. female that presents today for     -L hip pain:  Started last wk Saturday  Was moving things around in her room  Better with rest  Worse with movement  No fall or specific injury  Has tried some stretching and ice  Mostly in gluteal muscle.       -Asthma PRIOR VISIT:  Asthma worse, but hasn't been using her inhalers at all  Needs new rx    History of asthma?: Yes  Current medication regimen -     Frequency of day symptoms? A few times wk if that when takes flovent as rx'd. Right now using every day  Frequency of night time Sx?  never    Chronic cough?: no  Chest pain/Tightness?:  no  Shortness of breath?: no  Wheezing?  no    Last PFTs - SEPT 2019    Known triggers?   Yes  Hospitalized and/or intubated in the past?: No  Smoker or smoke exposure in the home?: Yes  Number of times prescribed oral steroids in the past year - 1    UPDATE LAST VISIT:   Breathing doing much better now that taking meds again  Not needing rescue inhaler nearly as often now that taking flovent again  Denies cough/wheezing or SOB    UPDATE TODAY:   Had been doing well  Lost inhalers    So having inc cough, wheezing and mild SOB last wk  No fevers  No body aches  No loss of taste or smell  Found back flovent and alb yesterday, resumed today      Age/Gender Health Maintenance    Lipid -   Lab Results   Component Value Date    CHOL 209 (H) 06/29/2021    CHOL 200 (H) 01/08/2019    CHOL 207 (H) 09/21/2016     Lab Results   Component Value Date    TRIG 288 (H) 06/29/2021    TRIG 287 (H) 01/08/2019    TRIG 178 09/21/2016     Lab Results   Component Value Date    HDL 30 06/29/2021    HDL 34 01/08/2019    HDL 40 09/21/2016     Lab Results   Component Value Date    LDLCALC 121 06/29/2021    LDLCALC 109 01/08/2019    [address] 09/21/2016     No results found for: LABVLDL, MG extended release tablet TAKE 1 TABLET BY MOUTH ONCE DAILY 90 tablet 3    albuterol sulfate HFA (PROAIR HFA) 108 (90 Base) MCG/ACT inhaler Inhale 2 puffs into the lungs every 4 hours as needed for Wheezing or Shortness of Breath 2 Inhaler 3    fluticasone (FLOVENT HFA) 110 MCG/ACT inhaler Inhale 1 puff into the lungs 2 times daily 3 Inhaler 3    amLODIPine-benazepril (LOTREL) 10-20 MG per capsule TAKE 1 CAPSULE BY MOUTH ONCE DAILY 90 capsule 3    clopidogrel (PLAVIX) 75 MG tablet Take 1 tablet by mouth daily 90 tablet 3    metFORMIN (GLUCOPHAGE) 850 MG tablet Take 1 tablet by mouth 2 times daily (with meals) 180 tablet 3    omega-3 acid ethyl esters (LOVAZA) 1 g capsule       Blood Glucose Monitoring Suppl KIT Use As Directed 1 kit 0    blood glucose monitor strips Use to check sugars twice daily 300 strip 3    Lancets MISC Use to check sugars twice daily 300 each 3    hydrOXYzine (ATARAX) 25 MG tablet Take 1 tablet by mouth nightly as needed for Anxiety 90 tablet 2    lansoprazole (PREVACID SOLUTAB) 30 MG disintegrating tablet Take 30 mg by mouth daily      vitamin D (CHOLECALCIFEROL) 1000 UNITS TABS tablet Take 2,000 Units by mouth daily       sertraline (ZOLOFT) 100 MG tablet Take 100 mg by mouth daily.  aspirin 325 MG tablet Take 325 mg by mouth daily. No current facility-administered medications for this visit. Orders Placed This Encounter   Medications    predniSONE (DELTASONE) 20 MG tablet     Sig: Take 2 tablets by mouth daily for 5 days     Dispense:  10 tablet     Refill:  0    tiZANidine (ZANAFLEX) 4 MG tablet     Sig: Take 1 tablet by mouth every 8 hours as needed (L buttocks pain/spasm)     Dispense:  45 tablet     Refill:  0         All medications reviewed and reconciled, including OTC and herbal medications. Updated list given to patient.        Patient Active Problem List    Diagnosis Date Noted    Dyslipidemia     Asthma, mild persistent     Depression with anxiety     DM2 [facility] mellitus[facility] type 2) [facility]     GERD (gastroesophageal reflux disease)     Glaucoma     History of CVA (cerebrovascular accident)      x 2 per pt; last was ~2010      Microalbuminuria due to type 2 diabetes mellitus ([facility] [address]     Nicotine dependence     Umbilical hernia      as a complication  after cholecystectomy per pt      Urge urinary incontinence     Hypertension, essential        Past Medical History:   Diagnosis Date    Asthma, mild persistent     Depression with anxiety     DM2 (diabetes mellitus, type 2) (HCC)     Dyslipidemia     GERD (gastroesophageal reflux disease)     Glaucoma     History of CVA (cerebrovascular accident)     x 2 per pt; last was ~2010    Hypertension, essential     Microalbuminuria due to type 2 diabetes mellitus ([facility] [address]     Nicotine dependence     Umbilical hernia     as a complication  after cholecystectomy per pt    Urge urinary incontinence        Past Surgical History:   Procedure Laterality Date    CATARACT REMOVAL WITH IMPLANT Bilateral     CHOLECYSTECTOMY      COLONOSCOPY  2014, 2016    FOOT SURGERY Right     10/17/13, hardware placed and removed    TONSILLECTOMY      UPPER GASTROINTESTINAL ENDOSCOPY  2014, 2016       Allergies   Allergen Reactions    Latex        Social History     Tobacco Use    Smoking status: Current Every Day Smoker     Packs/day: 1.00     Years: 33.00     Pack years: 33.00    Smokeless tobacco: Never Used   Substance Use Topics    Alcohol use: No       Family History   Problem Relation Age of Onset    Diabetes Mother     Glaucoma Mother     Heart Attack Father     Diabetes Sister     Breast Cancer Sister     Diabetes Brother     Colon Polyps Brother 52    Colon Polyps Sister 54    Colon Cancer Neg Hx          I have reviewed the patient's past medical history, past surgical history, allergies, medications, social and family history and I have made updates where appropriate.       Review of Systems  Positive responses are highlighted in bold    Constitutional:  Fever, Chills, Night Sweats, Fatigue, Unexpected changes in weight  HENT:  Ear pain, Tinnitus, Nosebleeds, Trouble swallowing, Hearing loss, Sore throat  Cardiovascular:  Chest Pain, Palpitations, Orthopnea, Paroxysmal Nocturnal Dyspnea  Respiratory:  Cough, Wheezing, Shortness of breath, Chest tightness, Apnea  Gastrointestinal:  Nausea, Vomiting, Diarrhea, Constipation, Heartburn, Blood in stool  Genitourinary:  Difficulty or painful urination, Flank pain, Change in frequency, Urgency  Skin:  Color change, Rash, Itching, Wound  Musculoskeletal:  Joint pain, Back pain, Gait problems, Joint swelling, Myalgias  Neurological:  Dizziness, Headaches, Presyncope, Numbness, Seizures, Tremors  Endocrine:  Heat Intolerance, Cold Intolerance, Polydipsia, Polyphagia, Polyuria      PHYSICAL EXAM:  Vitals:    10/26/21 0907   BP: 138/70   Pulse: 73   Resp: 16   Temp: 98.8 °F (37.1 °C)   TempSrc: Oral   SpO2: 92%   Weight: 235 lb 8 oz (106.8 kg)   Height: 5' 6\" (1.676 m)     Body mass index is 38.01 kg/m². Pain Score:   5 (L buttocks/hip)    VS Reviewed  General Appearance: A&O x 3, No acute distress,well developed and well- nourished  Eyes: pupils equal, round, and reactive to light, extraocular eye movements intact, conjunctivae and eye lids without erythema  ENT: external ear and ear canal clear bilaterally, TMs intact and regular, nose without deformity, nasal mucosa and turbinates normal without polyps, oropharynx normal, dentition is normal for age  Neck: supple and non-tender without mass, no thyromegaly or thyroid nodules, no cervical lymphadenopathy  Pulmonary/Chest: clear to auscultation bilaterally- no wheezes, rales or rhonchi, normal air movement, no respiratory distress or retractions  Cardiovascular: S1 and S2 auscultated w/ RRR. No murmurs, rubs, clicks, or gallops, distal pulses intact.   Abdomen: soft, non-tender, non-distended, bowel sounds physiologic,  no rebound or guarding, no masses or hernias noted. Liver and spleen without enlargement. Extremities: no cyanosis, clubbing or edema of the lower extremities. Skin: warm and dry, no rash or erythema  HIP EXAM:  Examination of the left hip shows: There is not deformity. There is not erythema. There is mild pain with internal and external rotation. ROM diminished range of motion. Leg lengths: Equal  Trochanteric region is not tender to palpation. Sacral Iliac is  tender to palpation but more on belly of gluteal muscle  There is mild pain with weight bearing. ASSESSMENT & PLAN  1. Muscle strain of left gluteal region, initial encounter    Hx and exam gluteal strain  Given HEP  pred  tiz  F/u next wk    - predniSONE (DELTASONE) 20 MG tablet; Take 2 tablets by mouth daily for 5 days  Dispense: 10 tablet; Refill: 0  - tiZANidine (ZANAFLEX) 4 MG tablet; Take 1 tablet by mouth every 8 hours as needed (L buttocks pain/spasm)  Dispense: 45 tablet; Refill: 0    2. Mild persistent asthma with acute exacerbation    AE asthma d/t missing flovent/prn alb  Back on now, starting yesterday  pred burst  con't prn alb  F/u 1 wk  Monitor sugars while on pred d/t DM hx  Reviewed ER precautions, pt understands. - predniSONE (DELTASONE) 20 MG tablet; Take 2 tablets by mouth daily for 5 days  Dispense: 10 tablet; Refill: 0      DISPOSITION    Return in about 1 week (around 11/2/2021) for f/u L hip pain as asthma, sooner as needed. Jermain Irizarry released without restrictions. Future Appointments   Date Time Provider Demi Farley   12/15/2021 10:40 AM Venita Gonzalez DO [facility]     PATIENT COUNSELING    Barriers to learning and self management: none    Discussed use, benefit, and side effects of prescribed medications. Barriers to medication compliance addressed. All patient questions answered. Pt voiced understanding.        Electronically signed by Venita Gonzalez DO on 10/26/2021 at 9:48 AM

## 2021-10-26 ENCOUNTER — OFFICE VISIT (OUTPATIENT)
Dept: FAMILY MEDICINE CLINIC | Age: 61
End: 2021-10-26
Payer: MEDICARE

## 2021-10-26 VITALS
HEIGHT: 66 IN | OXYGEN SATURATION: 92 % | HEART RATE: 73 BPM | DIASTOLIC BLOOD PRESSURE: 70 MMHG | TEMPERATURE: 98.8 F | SYSTOLIC BLOOD PRESSURE: 138 MMHG | RESPIRATION RATE: 16 BRPM | WEIGHT: 235.5 LBS | BODY MASS INDEX: 37.85 KG/M2

## 2021-10-26 DIAGNOSIS — J45.31 MILD PERSISTENT ASTHMA WITH ACUTE EXACERBATION: Chronic | ICD-10-CM

## 2021-10-26 DIAGNOSIS — S76.012A MUSCLE STRAIN OF LEFT GLUTEAL REGION, INITIAL ENCOUNTER: Primary | ICD-10-CM

## 2021-10-26 PROCEDURE — 99214 OFFICE O/P EST MOD 30 MIN: CPT | Performed by: FAMILY MEDICINE

## 2021-10-26 RX ORDER — PREDNISONE 20 MG/1
40 TABLET ORAL DAILY
Qty: 10 TABLET | Refills: 0 | Status: SHIPPED | OUTPATIENT
Start: 2021-10-26 | End: 2021-10-31

## 2021-10-26 RX ORDER — TIZANIDINE 4 MG/1
4 TABLET ORAL EVERY 8 HOURS PRN
Qty: 45 TABLET | Refills: 0 | Status: SHIPPED | OUTPATIENT
Start: 2021-10-26 | End: 2022-02-02 | Stop reason: SDUPTHER

## 2021-10-26 NOTE — PATIENT INSTRUCTIONS
Patient Education        Gluteal Strain: Rehab Exercises  Introduction  Here are some examples of exercises for you to try. The exercises may be suggested for a condition or for rehabilitation. Start each exercise slowly. Ease off the exercises if you start to have pain. You will be told when to start these exercises and which ones will work best for you. How to do the exercises  Hip rotator stretch    1. Lie on your back with both knees bent and your feet flat on the floor. 2. Put the ankle of your affected leg on your opposite thigh near your knee. 3. Use your hand to gently push the knee of your affected leg away from your body until you feel a gentle stretch around your hip. 4. Hold the stretch for 15 to 30 seconds. 5. Repeat 2 to 4 times. 6. Repeat steps 1 through 5, but this time use your hand to gently pull your knee toward your opposite shoulder. 7. Switch legs and repeat steps 1 through 6, even if only one hip is sore. Seated hip rotator stretch    1. Sit in a sturdy chair. 2. Cross your affected leg over your knee, resting your foot on top of your knee. 3. Keep your back straight, and slowly lean forward until you feel a stretch in your hip. 4. Hold for 15 to 30 seconds. 5. Switch legs and repeat steps 1 through 4 on your other side. 6. Repeat 2 to 4 times. Hamstring stretch (lying down)    1. Lie flat on your back with your legs straight. If you feel discomfort in your back, place a small towel roll under your lower back. 2. Holding the back of your affected leg, lift your leg straight up and toward your body until you feel a stretch at the back of your thigh. 3. Hold the stretch for at least 30 seconds. 4. Repeat 2 to 4 times. 5. Switch legs and repeat steps 1 through 4, even if only one hip is sore. Bridging    1. Lie on your back with both knees bent. Your knees should be bent about 90 degrees.   2. Then push your feet into the floor, squeeze your buttocks, and lift your hips off the floor until your shoulders, hips, and knees are all in a straight line. 3. Hold for about 6 seconds as you continue to breathe normally, and then slowly lower your hips back down to the floor and rest for up to 10 seconds. 4. Repeat 8 to 12 times. Single-leg bridge    1. Lie on your back, with your arms at your sides. 2. Bend one knee, and keep that foot flat on the floor. The other leg should be straight. 3. Raise the straight leg up so that the knee is level with the bent knee. 4. Tighten your belly muscles by pulling your belly button in toward your spine. Lift your buttocks up and be careful not to let your hips drop down. 5. Hold for about 6 seconds as you continue to breathe normally, and then slowly lower your hips back down to the floor. 6. Switch legs and repeat steps 1 though 5.  7. Repeat 8 to 12 times. Follow-up care is a key part of your treatment and safety. Be sure to make and go to all appointments, and call your doctor if you are having problems. It's also a good idea to know your test results and keep a list of the medicines you take. Where can you learn more? Go to https://[employer]. org and sign in to your [employer] account. Enter O164 in the 17u.cn box to learn more about \"Gluteal Strain: Rehab Exercises. \"     If you do not have an account, please click on the \"Sign Up Now\" link. Current as of: July 1, 2021               Content Version: 13.0  © 2006-2021 [employer], Inc[employer]. Care instructions adapted under license by [facility] ([facility]). If you have questions about a medical condition or this instruction, always ask your healthcare professional. [address] any warranty or liability for your use of this information.

## 2021-11-01 NOTE — PROGRESS NOTES
(H) 09/21/2016     Lab Results   Component Value Date    TRIG 288 (H) 06/29/2021    TRIG 287 (H) 01/08/2019    TRIG 178 09/21/2016     Lab Results   Component Value Date    HDL 30 06/29/2021    HDL 34 01/08/2019    HDL 40 09/21/2016     Lab Results   Component Value Date    LDLCALC 121 06/29/2021    LDLCALC 109 01/08/2019    [address] 09/21/2016     No results found for: LABVLDL, VLDL  No results found for: CHOLHDLRATIO      TSH -   Lab Results   Component Value Date    TSH 4.000 06/29/2021       Colon Cancer Screening - + multiple polyps DEC 2016, repeat 3 years per Jennifer SEBASTIAN.  Pt to call to schedule (June 2021/july 2021)  Lung Cancer Screening (Age 54 to [de-identified] with 30 pack year hx, current smoker or quit within past 15 years) - LUNGRADS 3 June 2021, repeat 6 months    Tetanus - to get at pharmacy per medicare rules  Influenza Vaccine - UTD FALL 2021  Pneumonia Vaccine - UTD NOV 2016, PPV 23  Zoster - to get at pharmacy per medicare rules     Breast Cancer Screening - NEG MAY 2021  Cervical Cancer Screening - disucss next visit  Osteoporosis Screening - age 73      Diabetes Health Maintenance    A1C -   Lab Results   Component Value Date    LABA1C 7.4 06/02/2021    LABA1C 7.2 11/05/2020    LABA1C 6.8 08/27/2019     ACE/ARB - yes, benazapril  Eye - records pending  Foot - UTD NOV 2020  ASA - yes    Microal/Cr -   Component      Latest Ref Rng & Units 6/29/2021          10:01 AM   Protein, Urine      mg/dl 23.3   Creatinine, Urine      mg/dl 144.3   Prot/Creat Ratio, Ur       0.16     Lab Results   Component Value Date    LABMICR 3.96 06/29/2021    LABCREA 233.2 01/08/2019    MACRR 232 (H) 01/08/2019       eGFR -   No results found for: GFRESTIMATE  Lab Results   Component Value Date    LABGLOM 73 06/29/2021       Statin - yes, lipitor, 40 mg qhs      Current Outpatient Medications   Medication Sig Dispense Refill    tiZANidine (ZANAFLEX) 4 MG tablet Take 1 tablet by mouth every 8 hours as needed (L buttocks pain/spasm) 45 tablet 0    busPIRone (BUSPAR) 15 MG tablet Take 15 mg by mouth 2 times daily 180 tablet 3    atorvastatin (LIPITOR) 40 MG tablet Take 1 tablet by mouth daily 90 tablet 3    atenolol (TENORMIN) 25 MG tablet TAKE 1 TABLET BY MOUTH ONCE DAILY 90 tablet 3    oxybutynin (DITROPAN-XL) 10 MG extended release tablet TAKE 1 TABLET BY MOUTH ONCE DAILY 90 tablet 3    albuterol sulfate HFA (PROAIR HFA) 108 (90 Base) MCG/ACT inhaler Inhale 2 puffs into the lungs every 4 hours as needed for Wheezing or Shortness of Breath 2 Inhaler 3    fluticasone (FLOVENT HFA) 110 MCG/ACT inhaler Inhale 1 puff into the lungs 2 times daily 3 Inhaler 3    amLODIPine-benazepril (LOTREL) 10-20 MG per capsule TAKE 1 CAPSULE BY MOUTH ONCE DAILY 90 capsule 3    clopidogrel (PLAVIX) 75 MG tablet Take 1 tablet by mouth daily 90 tablet 3    metFORMIN (GLUCOPHAGE) 850 MG tablet Take 1 tablet by mouth 2 times daily (with meals) 180 tablet 3    omega-3 acid ethyl esters (LOVAZA) 1 g capsule       Blood Glucose Monitoring Suppl KIT Use As Directed 1 kit 0    blood glucose monitor strips Use to check sugars twice daily 300 strip 3    Lancets MISC Use to check sugars twice daily 300 each 3    hydrOXYzine (ATARAX) 25 MG tablet Take 1 tablet by mouth nightly as needed for Anxiety 90 tablet 2    lansoprazole (PREVACID SOLUTAB) 30 MG disintegrating tablet Take 30 mg by mouth daily      vitamin D (CHOLECALCIFEROL) 1000 UNITS TABS tablet Take 2,000 Units by mouth daily       sertraline (ZOLOFT) 100 MG tablet Take 100 mg by mouth daily.  aspirin 325 MG tablet Take 325 mg by mouth daily. No current facility-administered medications for this visit. No orders of the defined types were placed in this encounter. All medications reviewed and reconciled, including OTC and herbal medications. Updated list given to patient.        Patient Active Problem List    Diagnosis Date Noted    Dyslipidemia     Asthma, mild persistent     Depression with anxiety     DM2 (diabetes mellitus, type 2) (HCC)     GERD (gastroesophageal reflux disease)     Glaucoma     History of CVA (cerebrovascular accident)      x 2 per pt; last was ~2010      Microalbuminuria due to type 2 diabetes mellitus ([facility] [address]     Nicotine dependence     Umbilical hernia      as a complication  after cholecystectomy per pt      Urge urinary incontinence     Hypertension, essential        Past Medical History:   Diagnosis Date    Asthma, mild persistent     Depression with anxiety     DM2 (diabetes mellitus, type 2) (HCC)     Dyslipidemia     GERD (gastroesophageal reflux disease)     Glaucoma     History of CVA (cerebrovascular accident)     x 2 per pt; last was ~2010    Hypertension, essential     Microalbuminuria due to type 2 diabetes mellitus ([facility] [address]     Nicotine dependence     Umbilical hernia     as a complication  after cholecystectomy per pt    Urge urinary incontinence        Past Surgical History:   Procedure Laterality Date    CATARACT REMOVAL WITH IMPLANT Bilateral     CHOLECYSTECTOMY      COLONOSCOPY  2014, 2016    FOOT SURGERY Right     10/17/13, hardware placed and removed    TONSILLECTOMY      UPPER GASTROINTESTINAL ENDOSCOPY  2014, 2016       Allergies   Allergen Reactions    Latex        Social History     Tobacco Use    Smoking status: Current Every Day Smoker     Packs/day: 1.00     Years: 33.00     Pack years: 33.00    Smokeless tobacco: Never Used   Substance Use Topics    Alcohol use: No       Family History   Problem Relation Age of Onset    Diabetes Mother     Glaucoma Mother     Heart Attack Father     Diabetes Sister     Breast Cancer Sister     Diabetes Brother     Colon Polyps Brother 52    Colon Polyps Sister 54    Colon Cancer Neg Hx          I have reviewed the patient's past medical history, past surgical history, allergies, medications, social and family history and I have made updates where appropriate. Review of Systems  Positive responses are highlighted in bold    Constitutional:  Fever, Chills, Night Sweats, Fatigue, Unexpected changes in weight  HENT:  Ear pain, Tinnitus, Nosebleeds, Trouble swallowing, Hearing loss, Sore throat  Cardiovascular:  Chest Pain, Palpitations, Orthopnea, Paroxysmal Nocturnal Dyspnea  Respiratory:  Cough, Wheezing, Shortness of breath, Chest tightness, Apnea  Gastrointestinal:  Nausea, Vomiting, Diarrhea, Constipation, Heartburn, Blood in stool  Genitourinary:  Difficulty or painful urination, Flank pain, Change in frequency, Urgency  Skin:  Color change, Rash, Itching, Wound  Musculoskeletal:  Joint pain, Back pain, Gait problems, Joint swelling, Myalgias  Neurological:  Dizziness, Headaches, Presyncope, Numbness, Seizures, Tremors  Endocrine:  Heat Intolerance, Cold Intolerance, Polydipsia, Polyphagia, Polyuria      PHYSICAL EXAM:  Vitals:    11/02/21 0858   BP: 136/80   Pulse: 64   Resp: 16   Temp: 97.9 °F (36.6 °C)   TempSrc: Oral   SpO2: 93%   Weight: 238 lb (108 kg)   Height: 5' 6\" (1.676 m)     Body mass index is 38.41 kg/m². Pain Score:   2 (L hip)    VS Reviewed  General Appearance: A&O x 3, No acute distress,well developed and well- nourished  Eyes: pupils equal, round, and reactive to light, extraocular eye movements intact, conjunctivae and eye lids without erythema  ENT: external ear and ear canal clear bilaterally, TMs intact and regular, nose without deformity, nasal mucosa and turbinates normal without polyps, oropharynx normal, dentition is normal for age  Neck: supple and non-tender without mass, no thyromegaly or thyroid nodules, no cervical lymphadenopathy  Pulmonary/Chest: clear to auscultation bilaterally- no wheezes, rales or rhonchi, normal air movement, no respiratory distress or retractions  Cardiovascular: S1 and S2 auscultated w/ RRR. No murmurs, rubs, clicks, or gallops, distal pulses intact.   Abdomen: soft, non-tender, non-distended, bowel sounds physiologic,  no rebound or guarding, no masses or hernias noted. Liver and spleen without enlargement. Extremities: no cyanosis, clubbing or edema of the lower extremities. Skin: warm and dry, no rash or erythema      ASSESSMENT & PLAN  1. Muscle strain of left gluteal region, initial encounter    Improving  con't prn tiz  con't HEP  Offered PT, declines  If no better in 3 to 4 wks, call, and will setup with PT at that time    2. Mild persistent asthma with acute exacerbation    Good improvement from last visit  con't flovent bid  con't prn alb  con't good med compliance    3. Needs flu shot    - INFLUENZA, MDCK QUADV, 2 YRS AND OLDER, IM, PF, PREFILL SYR OR SDV, 0.5ML (FLUCELVAX QUADV, PF)      DISPOSITION    Return in 6 weeks (on 12/15/2021) for AWV, Follow-up Diabetes, follow-up on chronic medical conditions, sooner as needed. Luci Cornejo released without restrictions. Future Appointments   Date Time Provider Demi Farley   12/15/2021 10:40 AM Dimas Contreras DO [facility]     PATIENT COUNSELING    Barriers to learning and self management: none    Discussed use, benefit, and side effects of prescribed medications. Barriers to medication compliance addressed. All patient questions answered. Pt voiced understanding.        Electronically signed by Dimas Contreras DO on 11/2/2021 at 10:17 AM

## 2021-11-02 ENCOUNTER — OFFICE VISIT (OUTPATIENT)
Dept: FAMILY MEDICINE CLINIC | Age: 61
End: 2021-11-02
Payer: MEDICARE

## 2021-11-02 VITALS
TEMPERATURE: 97.9 F | DIASTOLIC BLOOD PRESSURE: 80 MMHG | RESPIRATION RATE: 16 BRPM | HEIGHT: 66 IN | OXYGEN SATURATION: 93 % | WEIGHT: 238 LBS | HEART RATE: 64 BPM | SYSTOLIC BLOOD PRESSURE: 136 MMHG | BODY MASS INDEX: 38.25 KG/M2

## 2021-11-02 DIAGNOSIS — Z23 NEEDS FLU SHOT: ICD-10-CM

## 2021-11-02 DIAGNOSIS — J45.31 MILD PERSISTENT ASTHMA WITH ACUTE EXACERBATION: ICD-10-CM

## 2021-11-02 DIAGNOSIS — S76.012A MUSCLE STRAIN OF LEFT GLUTEAL REGION, INITIAL ENCOUNTER: Primary | ICD-10-CM

## 2021-11-02 PROCEDURE — 90674 CCIIV4 VAC NO PRSV 0.5 ML IM: CPT | Performed by: FAMILY MEDICINE

## 2021-11-02 PROCEDURE — 99213 OFFICE O/P EST LOW 20 MIN: CPT | Performed by: FAMILY MEDICINE

## 2021-11-02 PROCEDURE — G0008 ADMIN INFLUENZA VIRUS VAC: HCPCS | Performed by: FAMILY MEDICINE

## 2021-11-02 NOTE — PROGRESS NOTES
Immunization(s) given during visit:    Immunizations Administered     Name Date Dose Route    Influenza, MDCK Quadv, IM, PF (Flucelvax 2 yrs and older) 11/2/2021 0.5 mL Intramuscular    Site: Deltoid- Right    Lot: [id number]    NDC: 79188-137-37          Most recent Vaccine Information Sheet dated 8/6/21 given to pt        Vaccine Information Sheet, \"Influenza - Inactivated\"  given to Kurt Gill, or parent/legal guardian of  Kurt Gill and verbalized understanding. Patient responses:    Have you ever had a reaction to a flu vaccine? No  Do you have an allergy to eggs, neomycin or polymixin? No  Do you have an allergy to Thimerosal, contact lens solution, or Merthiolate? No  Have you ever had Guillian Clyde Syndrome? No  Do you have any current illness? No  Do you have a temperature above 100 degrees? No  Are you pregnant? No  If pregnant, permission obtained from physician? No  Do you have an active neurological disorder? No      Flu vaccine given per order. Please see immunization tab.

## 2021-11-22 DIAGNOSIS — R80.9 TYPE 2 DIABETES MELLITUS WITH MICROALBUMINURIA, WITHOUT LONG-TERM CURRENT USE OF INSULIN (HCC): ICD-10-CM

## 2021-11-22 DIAGNOSIS — E11.29 TYPE 2 DIABETES MELLITUS WITH MICROALBUMINURIA, WITHOUT LONG-TERM CURRENT USE OF INSULIN (HCC): ICD-10-CM

## 2021-12-03 ENCOUNTER — TELEPHONE (OUTPATIENT)
Dept: FAMILY MEDICINE CLINIC | Age: 61
End: 2021-12-03

## 2021-12-03 DIAGNOSIS — R91.1 PULMONARY NODULE: Primary | ICD-10-CM

## 2021-12-03 NOTE — TELEPHONE ENCOUNTER
Pt informed and transferred her to [facility] and spoke to [facility] to get her CT of her chest scheduled

## 2021-12-03 NOTE — TELEPHONE ENCOUNTER
Please let pt know she is due for 6 month f/u CT chest to monitor new pulm nodule. Mid dEC 2021    Thanks! Diagnosis Orders   1.  Pulmonary nodule  CT CHEST WO CONTRAST     Future Appointments   Date Time Provider Demi Farley   12/15/2021 10:40 AM Venita Gonzalez DO [address] [facility]

## 2021-12-15 NOTE — PROGRESS NOTES
Chief Complaint   Patient presents with    Medicare AWV    Follow-up     DM2 and chronic issues    Depression     Much worse    Other     L buttocks/hip pain       History obtained from the patient. SUBJECTIVE:  Mac Stephenson is a 62 y.o. female that presents today for     -DM2: On metformin  No complications  Typically doesn't check sugars  a1c worse  Willing to inc dose      -Asthma:  Breathing stable  On advair      -HTN:    HPI:    Taking meds as prescribed ?: yes  Tolerating well ?: yes  Side Effects ?: denies  BP at home ?: <140/90  Working on TLCS ?: yes  Chest Pain/SOB/Palpitations? denies    BP Readings from Last 3 Encounters:   12/16/21 132/74   11/02/21 136/80   10/26/21 138/70       -GERD:     HPI:     Taking meds as prescribed ?: yes  Tolerating well ?: yes  Side Effects ?: denies  Dysphagia ?: denies  Odynophagia ?: denies  Melena ?: denies  Working on TLCS ?: yes        -Hx of CVA x 2:  Last was 2010  No residual sxs per pt  No weakness  On full dose asa and plavix  Pt not sure why she is on DAPT  Not on statin, pt not sure why not  Wants to get labs done before starting, reordered today      -Urge urinary incontinence: On ditropan  Works well  Keep sxs well controlled        -Vit d def: On supplementation  Tolerating well        -Depression/anxiety:  On zoloft and busar  Both are worse the last 6 wks        -Pulm nodule:  Noted on CT chest June 2021  Due for f/u CT, ordered and scheduled for 12/21      -HLD:    HPI:  High on labs  Started on lipitor  F/u labs better    Taking meds as prescribed ?: yes  Tolerating well ?: yes  Side Effects ?: denies  Muscle Pain?: denies  Working on TLCS ?: yes      -L hip pain PRIOR VISIT:  Started last wk Saturday  Was moving things around in her room  Better with rest  Worse with movement  No fall or specific injury  Has tried some stretching and ice  Mostly in gluteal muscle.      UPDATE LAST VISIT:   Doing better  Still with pain on standing, but nearly as bad as before  Done with steroids  Muscle relaxer, Tizanidine, helping. Doing HEP    UPDATE TODAY:   Worse again  In L buttocks  Radiates down leg  Better with sitting  Worse with standing       -Smoking:  Smoking about 1 PPD  Not quite ready to quit      Age/Gender Health Maintenance    Lipid -   Lab Results   Component Value Date    CHOL 111 12/15/2021    CHOL 209 (H) 06/29/2021    CHOL 200 (H) 01/08/2019     Lab Results   Component Value Date    TRIG 166 12/15/2021    TRIG 288 (H) 06/29/2021    TRIG 287 (H) 01/08/2019     Lab Results   Component Value Date    HDL 33 12/15/2021    HDL 30 06/29/2021    HDL 34 01/08/2019     Lab Results   Component Value Date    LDLCALC 45 12/15/2021    [address] 06/29/2021 [address] 01/08/2019     No results found for: LABVLDL, VLDL  No results found for: CHOLHDLRATIO      TSH -   Lab Results   Component Value Date    TSH 4.000 06/29/2021       Colon Cancer Screening - + multiple polyps DEC 2016, repeat 3 years per Jennifer SEBASTIAN.  Pt to call to schedule (June 2021/july 2021/DEC 2021)  Lung Cancer Screening (Age 54 to [de-identified] with 30 pack year hx, current smoker or quit within past 15 years) - LUNGRADS 3 June 2021, repeat 6 months    Tetanus - to get at pharmacy per medicare rules  Influenza Vaccine - UTD FALL 2021  Pneumonia Vaccine - UTD NOV 2016, PPV 23  Zoster - to get at pharmacy per medicare rules     Breast Cancer Screening - NEG MAY 2021  Cervical Cancer Screening - disucss next visit  Osteoporosis Screening - age 73      Diabetes Health Maintenance    A1C -   Lab Results   Component Value Date    LABA1C 7.7 12/16/2021    LABA1C 7.4 06/02/2021    LABA1C 7.2 11/05/2020    LABA1C 6.8 08/27/2019     ACE/ARB - yes, benazapril  Eye - records pending  Foot - UTD DEC 2021  ASA - yes    Microal/Cr -   Component      Latest Ref Rng & Units 6/29/2021          10:01 AM   Protein, Urine      mg/dl 23.3   Creatinine, Urine      mg/dl 144.3   Prot/Creat Ratio, Ur       0.16 Lab Results   Component Value Date    LABMICR 3.96 06/29/2021    LABCREA 233.2 01/08/2019    MACRR 232 (H) 01/08/2019       eGFR -   No results found for: GFRESTIMATE  Lab Results   Component Value Date    LABGLOM 73 06/29/2021       Statin - yes, lipitor, 40 mg qhs      Current Outpatient Medications   Medication Sig Dispense Refill    metFORMIN (GLUCOPHAGE) 1000 MG tablet Take 1 tablet by mouth 2 times daily (with meals) 180 tablet 3    sertraline (ZOLOFT) 100 MG tablet Take 1.5 tablets by mouth daily 45 tablet 3    tiZANidine (ZANAFLEX) 4 MG tablet Take 1 tablet by mouth every 8 hours as needed (L buttocks pain/spasm) 45 tablet 0    busPIRone (BUSPAR) 15 MG tablet Take 15 mg by mouth 2 times daily 180 tablet 3    atorvastatin (LIPITOR) 40 MG tablet Take 1 tablet by mouth daily 90 tablet 3    atenolol (TENORMIN) 25 MG tablet TAKE 1 TABLET BY MOUTH ONCE DAILY 90 tablet 3    oxybutynin (DITROPAN-XL) 10 MG extended release tablet TAKE 1 TABLET BY MOUTH ONCE DAILY 90 tablet 3    albuterol sulfate HFA (PROAIR HFA) 108 (90 Base) MCG/ACT inhaler Inhale 2 puffs into the lungs every 4 hours as needed for Wheezing or Shortness of Breath 2 Inhaler 3    fluticasone (FLOVENT HFA) 110 MCG/ACT inhaler Inhale 1 puff into the lungs 2 times daily 3 Inhaler 3    amLODIPine-benazepril (LOTREL) 10-20 MG per capsule TAKE 1 CAPSULE BY MOUTH ONCE DAILY 90 capsule 3    clopidogrel (PLAVIX) 75 MG tablet Take 1 tablet by mouth daily 90 tablet 3    omega-3 acid ethyl esters (LOVAZA) 1 g capsule       Blood Glucose Monitoring Suppl KIT Use As Directed 1 kit 0    blood glucose monitor strips Use to check sugars twice daily 300 strip 3    Lancets MISC Use to check sugars twice daily 300 each 3    hydrOXYzine (ATARAX) 25 MG tablet Take 1 tablet by mouth nightly as needed for Anxiety 90 tablet 2    lansoprazole (PREVACID SOLUTAB) 30 MG disintegrating tablet Take 30 mg by mouth daily      vitamin D (CHOLECALCIFEROL) 1000 UNITS TABS tablet Take 2,000 Units by mouth daily       aspirin 325 MG tablet Take 325 mg by mouth daily. No current facility-administered medications for this visit. Orders Placed This Encounter   Medications    metFORMIN (GLUCOPHAGE) 1000 MG tablet     Sig: Take 1 tablet by mouth 2 times daily (with meals)     Dispense:  180 tablet     Refill:  3    sertraline (ZOLOFT) 100 MG tablet     Sig: Take 1.5 tablets by mouth daily     Dispense:  45 tablet     Refill:  3         All medications reviewed and reconciled, including OTC and herbal medications. Updated list given to patient.        Patient Active Problem List    Diagnosis Date Noted    Dyslipidemia     Asthma, mild persistent     Depression with anxiety     DM2 [facility]di[facility] mellitus[facility] type 2) ([facility]     GERD (gastroesophageal reflux disease)     Glaucoma     History of CVA (cerebrovascular accident)      x 2 per pt; last was ~2010      Microalbuminuria due to type 2 diabetes mellitus ([address]     Nicotine dependence     Umbilical hernia      as a complication  after cholecystectomy per pt      Urge urinary incontinence     Hypertension, essential        Past Medical History:   Diagnosis Date    Asthma, mild persistent     Depression with anxiety     DM2 [facility]diabetes mellitus, type 2) ([facility]     Dyslipidemia     GERD (gastroesophageal reflux disease)     Glaucoma     History of CVA (cerebrovascular accident)     x 2 per pt; last was ~2010    Hypertension, essential     Microalbuminuria due to type 2 diabetes mellitus ([address]     Nicotine dependence     Umbilical hernia     as a complication  after cholecystectomy per pt    Urge urinary incontinence        Past Surgical History:   Procedure Laterality Date    CATARACT REMOVAL WITH IMPLANT Bilateral     CHOLECYSTECTOMY      COLONOSCOPY  2014, 2016    FOOT SURGERY Right     10/17/13, hardware placed and removed    TONSILLECTOMY      UPPER GASTROINTESTINAL ENDOSCOPY  2014, 2016 Allergies   Allergen Reactions    Latex        Social History     Tobacco Use    Smoking status: Current Every Day Smoker     Packs/day: 1.00     Years: 33.00     Pack years: 33.00    Smokeless tobacco: Never Used   Substance Use Topics    Alcohol use: No       Family History   Problem Relation Age of Onset    Diabetes Mother    Edita Han Glaucoma Mother     Heart Attack Father     Diabetes Sister     Breast Cancer Sister     Diabetes Brother     Colon Polyps Brother 52    Colon Polyps Sister 54    Colon Cancer Neg Hx          I have reviewed the patient's past medical history, past surgical history, allergies, medications, social and family history and I have made updates where appropriate. Review of Systems  Positive responses are highlighted in bold    Constitutional:  Fever, Chills, Night Sweats, Fatigue, Unexpected changes in weight  HENT:  Ear pain, Tinnitus, Nosebleeds, Trouble swallowing, Hearing loss, Sore throat  Cardiovascular:  Chest Pain, Palpitations, Orthopnea, Paroxysmal Nocturnal Dyspnea  Respiratory:  Cough, Wheezing, Shortness of breath, Chest tightness, Apnea  Gastrointestinal:  Nausea, Vomiting, Diarrhea, Constipation, Heartburn, Blood in stool  Genitourinary:  Difficulty or painful urination, Flank pain, Change in frequency, Urgency  Skin:  Color change, Rash, Itching, Wound  Musculoskeletal:  Joint pain, Back pain, Gait problems, Joint swelling, Myalgias  Neurological:  Dizziness, Headaches, Presyncope, Numbness, Seizures, Tremors  Endocrine:  Heat Intolerance, Cold Intolerance, Polydipsia, Polyphagia, Polyuria      PHYSICAL EXAM:  Vitals:    12/16/21 0934 12/16/21 1010   BP: (!) 148/78 132/74   Pulse: 83    Resp: 18    Temp: 98.6 °F (37 °C)    TempSrc: Oral    SpO2: 94%    Weight: 230 lb (104.3 kg)    Height: 5' 6.5\" (1.689 m)      Body mass index is 36.57 kg/m².   Pain Score:   3 (L buttocks/hip pain)    VS Reviewed  General Appearance: A&O x 3, No acute distress,well developed and well- nourished  Eyes: pupils equal, round, and reactive to light, extraocular eye movements intact, conjunctivae and eye lids without erythema  ENT: external ear and ear canal clear bilaterally, TMs intact and regular, nose without deformity, nasal mucosa and turbinates normal without polyps, oropharynx normal, dentition is normal for age  Neck: supple and non-tender without mass, no thyromegaly or thyroid nodules, no cervical lymphadenopathy  Pulmonary/Chest: clear to auscultation bilaterally- no wheezes, rales or rhonchi, normal air movement, no respiratory distress or retractions  Cardiovascular: S1 and S2 auscultated w/ RRR. No murmurs, rubs, clicks, or gallops, distal pulses intact. Abdomen: soft, non-tender, non-distended, bowel sounds physiologic,  no rebound or guarding, no masses or hernias noted. Liver and spleen without enlargement. Extremities: no cyanosis, clubbing or edema of the lower extremities. Skin: warm and dry, no rash or erythema  Psych: Affect constricted. Mood depressed. Thought process is normal without evidence of psychosis. Good insight and appropriate interaction. Cognition and memory appear to be intact. HIP EXAM:  Examination of the left hip shows: There is not deformity. There is not erythema. There is none pain with internal and external rotation. There is none pain with flexion and extension. There isnone pain with active SLR. ROM full range of motion. Leg lengths: Equal  Trochanteric region is not tender to palpation. Sacral Iliac is not tender to palpation. There is none pain with weight bearing. +TTP L piriformis  Foot: Bilat 2+DP/PT bilaterally. Skin warm, dry and intact. Sensation normal throughout to touch and with monofilament. Results for POC orders placed in visit on 12/16/21   POCT glycosylated hemoglobin (Hb A1C)   Result Value Ref Range    Hemoglobin A1C 7.7 (H) 4.3 - 5.7 %       ASSESSMENT & PLAN  1.  Type 2 diabetes mellitus with AM Andrew Meléndez DO [facility]     PATIENT COUNSELING    Barriers to learning and self management: none    Discussed use, benefit, and side effects of prescribed medications. Barriers to medication compliance addressed. All patient questions answered. Pt voiced understanding. Electronically signed by Andrew Meléndez DO on 2021 at 10:10 AM        Medicare Annual Wellness Visit  Name: Hernán Obrien Date: 2021   MRN: [id number] Sex: Female   Age: 62 y.o. Ethnicity: Non- / Non    : 1960 Race: White (non-)      Dorita Vargas is here for Medicare AWV, Follow-up (DM2 and chronic issues), Depression (Much worse), and Other (L buttocks/hip pain)    Screenings for behavioral, psychosocial and functional/safety risks, and cognitive dysfunction are all negative except as indicated below. These results, as well as other patient data from the [address] [facility] [address] [facility], are documented in Flowsheets linked to this Encounter. Allergies   Allergen Reactions    Latex          Prior to Visit Medications    Medication Sig Taking?  Authorizing Provider   metFORMIN (GLUCOPHAGE) 1000 MG tablet Take 1 tablet by mouth 2 times daily (with meals) Yes Andrew Meléndez DO   sertraline (ZOLOFT) 100 MG tablet Take 1.5 tablets by mouth daily Yes Hakan Manning DO   tiZANidine (ZANAFLEX) 4 MG tablet Take 1 tablet by mouth every 8 hours as needed (L buttocks pain/spasm) Yes Hakan Manning DO   busPIRone (BUSPAR) 15 MG tablet Take 15 mg by mouth 2 times daily Yes Hakan Manning DO   atorvastatin (LIPITOR) 40 MG tablet Take 1 tablet by mouth daily Yes Hakan Manning DO   atenolol (TENORMIN) 25 MG tablet TAKE 1 TABLET BY MOUTH ONCE DAILY Yes Hakan Manning DO   oxybutynin (DITROPAN-XL) 10 MG extended release tablet TAKE 1 TABLET BY MOUTH ONCE DAILY Yes Hakan Manning DO   albuterol sulfate HFA (PROAIR HFA) 108 (90 Base) MCG/ACT inhaler Inhale 2 puffs into the lungs every 4 hours as needed for Wheezing or Shortness of Breath Yes Hakan Manning, DO   fluticasone (FLOVENT HFA) 110 MCG/ACT inhaler Inhale 1 puff into the lungs 2 times daily Yes Hakan Manning, DO   amLODIPine-benazepril (LOTREL) 10-20 MG per capsule TAKE 1 CAPSULE BY MOUTH ONCE DAILY Yes Hardy Wilkerson DO   clopidogrel (PLAVIX) 75 MG tablet Take 1 tablet by mouth daily Yes Hardy Wilkerson DO   omega-3 acid ethyl esters (LOVAZA) 1 g capsule  Yes Historical Provider, MD   Blood Glucose Monitoring Suppl KIT Use As Directed Yes Hardy Wilkerson DO   blood glucose monitor strips Use to check sugars twice daily Yes aHrdy Wilkerson DO   Lancets MISC Use to check sugars twice daily Yes Hardy Wilkerson DO   hydrOXYzine (ATARAX) 25 MG tablet Take 1 tablet by mouth nightly as needed for Anxiety Yes Hardy Wilkerson DO   lansoprazole (PREVACID SOLUTAB) 30 MG disintegrating tablet Take 30 mg by mouth daily Yes Historical Provider, MD   vitamin D (CHOLECALCIFEROL) 1000 UNITS TABS tablet Take 2,000 Units by mouth daily  Yes Historical Provider, MD   aspirin 325 MG tablet Take 325 mg by mouth daily.    Yes Historical Provider, MD         Past Medical History:   Diagnosis Date    Asthma, mild persistent     Depression with anxiety     [facility]     Dyslipidemia     GERD (gastroesophageal reflux disease)     Glaucoma     History of CVA (cerebrovascular accident)     x 2 per pt; last was ~2010    Hypertension, essential     Microalbuminuria due to type 2 diabetes mellitus ([facility]     Nicotine dependence     Umbilical hernia     as a complication  after cholecystectomy per pt    Urge urinary incontinence        Past Surgical History:   Procedure Laterality Date    CATARACT REMOVAL WITH IMPLANT Bilateral     CHOLECYSTECTOMY      COLONOSCOPY  2014, 2016    FOOT SURGERY Right     10/17/13, hardware placed and removed    Frequency  1 pack/day for 33 years (33 pk yrs)    Smokeless Tobacco Use  Never Used          Alcohol History     Alcohol Use Status  No          Drug Use     Drug Use Status  No          Sexual Activity     Sexually Active  Not Asked               Alcohol Screening:       A score of 8 or more is associated with harmful or hazardous drinking. A score of 13 or more in women, and 15 or more in men, is likely to indicate alcohol dependence. Substance Abuse Interventions:  · Tobacco abuse:  tobacco cessation tips and resources provided    General Health and ACP:  General  In general, how would you say your health is?: (!) Poor  In the past 7 days, have you experienced any of the following?  New or Increased Pain, New or Increased Fatigue, Loneliness, Social Isolation, Stress or Anger?: (!) New or Increased Fatigue, Anger  Do you get the social and emotional support that you need?: Yes  Do you have a Living Will?: (!) No  Advance Directives     Power of  Living Will ACP-Advance Directive ACP-Power of     Not on File Not on File Not on File Not on File      General Health Risk Interventions:  · Poor self-assessment of health status: patient declines any further evaluation/treatment for this issue  · Fatigue: regular exercise recommended- 3-5 times per week, 30-45 minutes per session  · No Living Will: Patient declines ACP discussion/assistance    Health Habits/Nutrition:  Health Habits/Nutrition  Do you exercise for at least 20 minutes 2-3 times per week?: (!) No  Have you lost any weight without trying in the past 3 months?: No  Do you eat only one meal per day?: No  Have you seen the dentist within the past year?: (!) No  Body mass index: (!) 36.56  Health Habits/Nutrition Interventions:  · Inadequate physical activity:  educational materials provided to promote increased physical activity  · Nutritional issues:  educational materials for healthy, well-balanced diet provided  · Dental exam overdue: patient encouraged to make appointment with his/her dentist     Safety:  Safety  Do you have working smoke detectors?: Yes  Have all throw rugs been removed or fastened?: (!) No  Do you have non-slip mats or surfaces in all bathtubs/showers?: (!) No  Do all of your stairways have a railing or banister?: Yes  Are your doorways, halls and stairs free of clutter?: Yes  Do you always fasten your seatbelt when you are in a car?: (!) No  Safety Interventions:  · Home safety tips provided    ADL:  ADLs  In the past 7 days, did you need help from others to perform any of the following everyday activities? Eating, dressing, grooming, bathing, toileting, or walking/balance?: None  In the past 7 days, did you need help from others to take care of any of the following?  Laundry, housekeeping, banking/finances, shopping, telephone use, food preparation, transportation, or taking medications?: (!) Transportation, Banking/Finances, Shopping  ADL Interventions:  · has help with this    Personalized Preventive Plan   Current Health Maintenance Status  Immunization History   Administered Date(s) Administered    COVID-19, Steward Peter, PF, 30mcg/0.3mL 04/08/2021, 05/06/2021    Influenza, MDCK Quadv, IM, PF (Flucelvax 2 yrs and older) 11/02/2021    Influenza, Quadv, IM, PF (6 mo and older Fluzone, Flulaval, Fluarix, and 3 yrs and older Afluria) 10/24/2019, 11/05/2020    Pneumococcal Polysaccharide ([id number]) 11/02/2016        Health Maintenance   Topic Date Due    Hepatitis C screen  Never done    HIV screen  Never done    Diabetic retinal exam  Never done    DTaP/Tdap/Td vaccine (1 - Tdap) Never done    Cervical cancer screen  Never done    Shingles Vaccine (1 of 2) Never done    Colon cancer screen colonoscopy  12/23/2019    Annual Wellness Visit (AWV)  11/06/2021    COVID-19 Vaccine (3 - Booster for Pfizer series) 11/06/2021    Breast cancer screen  05/24/2022    Low dose CT lung screening  06/11/2022    A1C test (Diabetic or Prediabetic)  06/16/2022    Potassium monitoring  06/29/2022    Creatinine monitoring  06/29/2022    Lipid screen  12/15/2022    Diabetic foot exam  12/16/2022    Pneumococcal 0-64 years Vaccine (2 of 2 - PPSV23) 05/11/2026    Flu vaccine  Completed    Hepatitis A vaccine  Aged Out    Hib vaccine  Aged Out    Meningococcal (ACWY) vaccine  Aged Out     Recommendations for [employer] Due: see orders and patient instructions/AVS.    Recommended screening schedule for the next 5-10 years is provided to the patient in written form: see Patient Instructions/AVS.    Cayla Sales was seen today for medicare awv, follow-up and health maintenance. Diagnoses and all orders for this visit:      Routine general medical examination at a health care facility      Care plan reviewed with and given to patient.        Electronically signed by Ginette Sanders DO on 12/16/2021 at 10:10 AM

## 2021-12-16 ENCOUNTER — OFFICE VISIT (OUTPATIENT)
Dept: FAMILY MEDICINE CLINIC | Age: 61
End: 2021-12-16
Payer: MEDICARE

## 2021-12-16 VITALS
HEIGHT: 67 IN | WEIGHT: 230 LBS | HEART RATE: 83 BPM | DIASTOLIC BLOOD PRESSURE: 74 MMHG | RESPIRATION RATE: 18 BRPM | TEMPERATURE: 98.6 F | SYSTOLIC BLOOD PRESSURE: 132 MMHG | OXYGEN SATURATION: 94 % | BODY MASS INDEX: 36.1 KG/M2

## 2021-12-16 DIAGNOSIS — I10 HYPERTENSION, ESSENTIAL: ICD-10-CM

## 2021-12-16 DIAGNOSIS — N39.41 URGE URINARY INCONTINENCE: ICD-10-CM

## 2021-12-16 DIAGNOSIS — G57.02 PIRIFORMIS SYNDROME OF LEFT SIDE: ICD-10-CM

## 2021-12-16 DIAGNOSIS — E55.9 VITAMIN D DEFICIENCY: ICD-10-CM

## 2021-12-16 DIAGNOSIS — R80.9 TYPE 2 DIABETES MELLITUS WITH MICROALBUMINURIA, WITHOUT LONG-TERM CURRENT USE OF INSULIN (HCC): ICD-10-CM

## 2021-12-16 DIAGNOSIS — K21.9 GASTROESOPHAGEAL REFLUX DISEASE, UNSPECIFIED WHETHER ESOPHAGITIS PRESENT: ICD-10-CM

## 2021-12-16 DIAGNOSIS — F17.210 CIGARETTE NICOTINE DEPENDENCE WITHOUT COMPLICATION: ICD-10-CM

## 2021-12-16 DIAGNOSIS — E11.29 TYPE 2 DIABETES MELLITUS WITH MICROALBUMINURIA, WITHOUT LONG-TERM CURRENT USE OF INSULIN (HCC): ICD-10-CM

## 2021-12-16 DIAGNOSIS — R91.1 PULMONARY NODULE: ICD-10-CM

## 2021-12-16 DIAGNOSIS — J45.30 MILD PERSISTENT ASTHMA WITHOUT COMPLICATION: ICD-10-CM

## 2021-12-16 DIAGNOSIS — Z86.73 HISTORY OF CVA (CEREBROVASCULAR ACCIDENT): ICD-10-CM

## 2021-12-16 DIAGNOSIS — Z00.00 ROUTINE GENERAL MEDICAL EXAMINATION AT A HEALTH CARE FACILITY: Primary | ICD-10-CM

## 2021-12-16 DIAGNOSIS — E78.5 DYSLIPIDEMIA: ICD-10-CM

## 2021-12-16 DIAGNOSIS — F41.8 DEPRESSION WITH ANXIETY: ICD-10-CM

## 2021-12-16 LAB — HBA1C MFR BLD: 7.7 % (ref 4.3–5.7)

## 2021-12-16 PROCEDURE — G0439 PPPS, SUBSEQ VISIT: HCPCS | Performed by: FAMILY MEDICINE

## 2021-12-16 PROCEDURE — 99214 OFFICE O/P EST MOD 30 MIN: CPT | Performed by: FAMILY MEDICINE

## 2021-12-16 PROCEDURE — 3051F HG A1C>EQUAL 7.0%<8.0%: CPT | Performed by: FAMILY MEDICINE

## 2021-12-16 RX ORDER — SERTRALINE HYDROCHLORIDE 100 MG/1
150 TABLET, FILM COATED ORAL DAILY
Qty: 45 TABLET | Refills: 3 | Status: SHIPPED | OUTPATIENT
Start: 2021-12-16 | End: 2022-02-02 | Stop reason: ALTCHOICE

## 2021-12-16 ASSESSMENT — PATIENT HEALTH QUESTIONNAIRE - PHQ9
4. FEELING TIRED OR HAVING LITTLE ENERGY: 3
3. TROUBLE FALLING OR STAYING ASLEEP: 3
SUM OF ALL RESPONSES TO PHQ9 QUESTIONS 1 & 2: 6
5. POOR APPETITE OR OVEREATING: 3
SUM OF ALL RESPONSES TO PHQ QUESTIONS 1-9: 23
2. FEELING DOWN, DEPRESSED OR HOPELESS: 3
6. FEELING BAD ABOUT YOURSELF - OR THAT YOU ARE A FAILURE OR HAVE LET YOURSELF OR YOUR FAMILY DOWN: 3
1. LITTLE INTEREST OR PLEASURE IN DOING THINGS: 3
7. TROUBLE CONCENTRATING ON THINGS, SUCH AS READING THE NEWSPAPER OR WATCHING TELEVISION: 3
9. THOUGHTS THAT YOU WOULD BE BETTER OFF DEAD, OR OF HURTING YOURSELF: 0
8. MOVING OR SPEAKING SO SLOWLY THAT OTHER PEOPLE COULD HAVE NOTICED. OR THE OPPOSITE, BEING SO FIGETY OR RESTLESS THAT YOU HAVE BEEN MOVING AROUND A LOT MORE THAN USUAL: 2

## 2021-12-16 ASSESSMENT — LIFESTYLE VARIABLES: HOW OFTEN DO YOU HAVE A DRINK CONTAINING ALCOHOL: 0

## 2021-12-16 NOTE — PATIENT INSTRUCTIONS
Personalized Preventive Plan for Nato Del Valle - 12/16/2021  Medicare offers a range of preventive health benefits. Some of the tests and screenings are paid in full while other may be subject to a deductible, co-insurance, and/or copay. Some of these benefits include a comprehensive review of your medical history including lifestyle, illnesses that may run in your family, and various assessments and screenings as appropriate. After reviewing your medical record and screening and assessments performed today your provider may have ordered immunizations, labs, imaging, and/or referrals for you. A list of these orders (if applicable) as well as your Preventive Care list are included within your After Visit Summary for your review. Other Preventive Recommendations:    · A preventive eye exam performed by an eye specialist is recommended every 1-2 years to screen for glaucoma; cataracts, macular degeneration, and other eye disorders. · A preventive dental visit is recommended every 6 months. · Try to get at least 150 minutes of exercise per week or 10,000 steps per day on a pedometer . · Order or download the FREE \"Exercise & Physical Activity: Your Everyday Guide\" from [employer]. Call 0-494.630.2417 or search [employer] online. · You need 8323-2555 mg of calcium and 7866-7160 IU of vitamin D per day. It is possible to meet your calcium requirement with diet alone, but a vitamin D supplement is usually necessary to meet this goal.  · When exposed to the sun, use a sunscreen that protects against both UVA and UVB radiation with an SPF of 30 or greater. Reapply every 2 to 3 hours or after sweating, drying off with a towel, or swimming. · Always wear a seat belt when traveling in a car. Always wear a helmet when riding a bicycle or motorcycle.

## 2021-12-22 DIAGNOSIS — F41.8 DEPRESSION WITH ANXIETY: ICD-10-CM

## 2021-12-22 RX ORDER — BUSPIRONE HYDROCHLORIDE 10 MG/1
TABLET ORAL
Qty: 90 TABLET | Refills: 10 | OUTPATIENT
Start: 2021-12-22

## 2021-12-22 RX ORDER — BUSPIRONE HYDROCHLORIDE 15 MG/1
15 TABLET ORAL 2 TIMES DAILY
Qty: 180 TABLET | Refills: 3 | Status: SHIPPED | OUTPATIENT
Start: 2021-12-22 | End: 2022-07-18

## 2022-01-07 ENCOUNTER — HOSPITAL ENCOUNTER (OUTPATIENT)
Dept: CT IMAGING | Age: 62
Discharge: HOME | End: 2022-01-07
Payer: MEDICARE

## 2022-01-07 DIAGNOSIS — R91.1 PULMONARY NODULE: ICD-10-CM

## 2022-01-07 PROCEDURE — 71250 CT THORAX DX C-: CPT

## 2022-01-10 ENCOUNTER — TELEPHONE (OUTPATIENT)
Dept: FAMILY MEDICINE CLINIC | Age: 62
End: 2022-01-10

## 2022-01-10 NOTE — TELEPHONE ENCOUNTER
----- Message from Magi Solomon DO sent at 1/9/2022 11:26 AM EST -----  Please let pt know that f/u Chest CT looked good  Nodules seen prior have resolved  Return to once yearly CT for cancer screening  Let me know if questions, thanks!
Pt notified and agreeable .
good, to achieve stated therapy goals

## 2022-01-27 ENCOUNTER — TELEPHONE (OUTPATIENT)
Dept: FAMILY MEDICINE CLINIC | Age: 62
End: 2022-01-27

## 2022-01-27 NOTE — TELEPHONE ENCOUNTER
Pt missed appt today . Please call and check to make sure pt is okay. Please have pt  Reschedule appt .

## 2022-02-01 NOTE — PROGRESS NOTES
Chief Complaint   Patient presents with    Follow-up     DM2, Depression/Anxiety, L hip pain and smoking       History obtained from the patient. SUBJECTIVE:  Lisette Hill is a 62 y.o. female that presents today for     -DM2: On metformin, increased last visit to 1000mg bid d/t a1c rising. Tolerating well thus far  Working on some diet changes      -Depression/anxiety LAST VISIT:  On zoloft and busar  Both are worse the last 6 wks    UPDATE TODAY:   We inc zoloft to 150mg last visit from 100mg  However, feels depression worse, not better  Having some side effects too  Anxiety ok  Was on wellbutrin in the remote past. But has been on some dose of zoloft/buspar since at least 2019  Denies SI/HI         -L hip pain PRIOR VISIT:  Started last wk Saturday  Was moving things around in her room  Better with rest  Worse with movement  No fall or specific injury  Has tried some stretching and ice  Mostly in gluteal muscle. UPDATE PRIOR VISIT:   Doing better  Still with pain on standing, but nearly as bad as before  Done with steroids  Muscle relaxer, Tizanidine, helping.    Doing HEP    UPDATE LAST VISIT:   Worse again  In L buttocks  Radiates down leg  Better with sitting  Worse with standing     UPDATE TODAY:   Pain a bit worse  Just started PT last wk though  In L buttocks, radiates down leg  Ran out of tizanidine, but was helping when taking.        -Smoking:  Smoking about 1 PPD  Not quite ready to quit      Age/Gender Health Maintenance    Lipid -   Lab Results   Component Value Date    CHOL 111 12/15/2021    CHOL 209 (H) 06/29/2021    CHOL 200 (H) 01/08/2019     Lab Results   Component Value Date    TRIG 166 12/15/2021    TRIG 288 (H) 06/29/2021    TRIG 287 (H) 01/08/2019     Lab Results   Component Value Date    HDL 33 12/15/2021    HDL 30 06/29/2021    HDL 34 01/08/2019     Lab Results   Component Value Date    LDLCALC 45 12/15/2021    LDLCALC 121 06/29/2021    LDLCALC 109 01/08/2019     No results found for: LABVLDL, VLDL  No results found for: CHOLHDLRATIO      TSH -   Lab Results   Component Value Date    TSH 4.000 06/29/2021       Colon Cancer Screening - + multiple polyps DEC 2016, repeat 3 years per Jennifer SEBASTIAN. Pt to call to schedule (June 2021/july 2021/DEC 2021/FEB 2022)  Lung Cancer Screening (Age 54 to [de-identified] with 30 pack year hx, current smoker or quit within past 15 years) - LUNGRADS 3 June 2021, repeat 6 months    Tetanus - to get at pharmacy per medicare rules  Influenza Vaccine - UTD FALL 2021  Pneumonia Vaccine - UTD NOV 2016, PPV 23  Zoster - to get at pharmacy per medicare rules     Breast Cancer Screening - NEG MAY 2021  Cervical Cancer Screening - disucss next visit  Osteoporosis Screening - age 73      Diabetes Health Maintenance    A1C -   Lab Results   Component Value Date    LABA1C 7.7 12/16/2021    LABA1C 7.4 06/02/2021    LABA1C 7.2 11/05/2020    LABA1C 6.8 08/27/2019     ACE/ARB - yes, benazapril  Eye - records pending  Foot - UTD DEC 2021  ASA - yes    Microal/Cr -   Component      Latest Ref Rng & Units 6/29/2021          10:01 AM   Protein, Urine      mg/dl 23.3   Creatinine, Urine      mg/dl 144.3   Prot/Creat Ratio, Ur       0.16     Lab Results   Component Value Date    LABMICR 3.96 06/29/2021    LABCREA 233.2 01/08/2019    MACRR 232 (H) 01/08/2019       eGFR -   No results found for: GFRESTIMATE  Lab Results   Component Value Date    LABGLOM 73 06/29/2021       Statin - yes, lipitor, 40 mg qhs      Current Outpatient Medications   Medication Sig Dispense Refill    gabapentin (NEURONTIN) 100 MG capsule Take 1 capsule by mouth 3 times daily for 90 days.  Intended supply: 90 days 90 capsule 2    escitalopram (LEXAPRO) 10 MG tablet Take 1 tablet by mouth daily 30 tablet 3    tiZANidine (ZANAFLEX) 4 MG tablet Take 1 tablet by mouth every 8 hours as needed (L buttocks pain/spasm) 90 tablet 2    busPIRone (BUSPAR) 15 MG tablet Take 15 mg by mouth 2 times daily 180 tablet 3    metFORMIN (GLUCOPHAGE) 1000 MG tablet Take 1 tablet by mouth 2 times daily (with meals) 180 tablet 3    atorvastatin (LIPITOR) 40 MG tablet Take 1 tablet by mouth daily 90 tablet 3    atenolol (TENORMIN) 25 MG tablet TAKE 1 TABLET BY MOUTH ONCE DAILY 90 tablet 3    oxybutynin (DITROPAN-XL) 10 MG extended release tablet TAKE 1 TABLET BY MOUTH ONCE DAILY 90 tablet 3    albuterol sulfate HFA (PROAIR HFA) 108 (90 Base) MCG/ACT inhaler Inhale 2 puffs into the lungs every 4 hours as needed for Wheezing or Shortness of Breath 2 Inhaler 3    fluticasone (FLOVENT HFA) 110 MCG/ACT inhaler Inhale 1 puff into the lungs 2 times daily 3 Inhaler 3    amLODIPine-benazepril (LOTREL) 10-20 MG per capsule TAKE 1 CAPSULE BY MOUTH ONCE DAILY 90 capsule 3    clopidogrel (PLAVIX) 75 MG tablet Take 1 tablet by mouth daily 90 tablet 3    omega-3 acid ethyl esters (LOVAZA) 1 g capsule       Blood Glucose Monitoring Suppl KIT Use As Directed 1 kit 0    blood glucose monitor strips Use to check sugars twice daily 300 strip 3    Lancets MISC Use to check sugars twice daily 300 each 3    hydrOXYzine (ATARAX) 25 MG tablet Take 1 tablet by mouth nightly as needed for Anxiety 90 tablet 2    lansoprazole (PREVACID SOLUTAB) 30 MG disintegrating tablet Take 30 mg by mouth daily      vitamin D (CHOLECALCIFEROL) 1000 UNITS TABS tablet Take 2,000 Units by mouth daily       aspirin 325 MG tablet Take 325 mg by mouth daily. No current facility-administered medications for this visit. Orders Placed This Encounter   Medications    gabapentin (NEURONTIN) 100 MG capsule     Sig: Take 1 capsule by mouth 3 times daily for 90 days.  Intended supply: 90 days     Dispense:  90 capsule     Refill:  2    escitalopram (LEXAPRO) 10 MG tablet     Sig: Take 1 tablet by mouth daily     Dispense:  30 tablet     Refill:  3    tiZANidine (ZANAFLEX) 4 MG tablet     Sig: Take 1 tablet by mouth every 8 hours as needed (L buttocks pain/spasm) Dispense:  90 tablet     Refill:  2         All medications reviewed and reconciled, including OTC and herbal medications. Updated list given to patient.        Patient Active Problem List    Diagnosis Date Noted    Dyslipidemia     Asthma, mild persistent     Depression with anxiety     DM2 (diabetes mellitus, type 2) (HCC)     GERD (gastroesophageal reflux disease)     Glaucoma     History of CVA (cerebrovascular accident)      x 2 per pt; last was ~2010      Microalbuminuria due to type 2 diabetes mellitus ([facility] [address]     Nicotine dependence     Umbilical hernia      as a complication  after cholecystectomy per pt      Urge urinary incontinence     Hypertension, essential        Past Medical History:   Diagnosis Date    Asthma, mild persistent     Depression with anxiety     DM2 (diabetes mellitus, type 2) (HCC)     Dyslipidemia     GERD (gastroesophageal reflux disease)     Glaucoma     History of CVA (cerebrovascular accident)     x 2 per pt; last was ~2010    Hypertension, essential     Microalbuminuria due to type 2 diabetes mellitus ([facility] [address]     Nicotine dependence     Umbilical hernia     as a complication  after cholecystectomy per pt    Urge urinary incontinence        Past Surgical History:   Procedure Laterality Date    CATARACT REMOVAL WITH IMPLANT Bilateral     CHOLECYSTECTOMY      COLONOSCOPY  2014, 2016    FOOT SURGERY Right     10/17/13, hardware placed and removed    TONSILLECTOMY      UPPER GASTROINTESTINAL ENDOSCOPY  2014, 2016       Allergies   Allergen Reactions    Latex        Social History     Tobacco Use    Smoking status: Current Every Day Smoker     Packs/day: 1.00     Years: 33.00     Pack years: 33.00    Smokeless tobacco: Never Used   Substance Use Topics    Alcohol use: No       Family History   Problem Relation Age of Onset    Diabetes Mother     Glaucoma Mother     Heart Attack Father     Diabetes Sister     Breast Cancer Sister     Diabetes Brother  Colon Polyps Brother 52    Colon Polyps Sister 54    Colon Cancer Neg Hx          I have reviewed the patient's past medical history, past surgical history, allergies, medications, social and family history and I have made updates where appropriate. Review of Systems  Positive responses are highlighted in bold    Constitutional:  Fever, Chills, Night Sweats, Fatigue, Unexpected changes in weight  HENT:  Ear pain, Tinnitus, Nosebleeds, Trouble swallowing, Hearing loss, Sore throat  Cardiovascular:  Chest Pain, Palpitations, Orthopnea, Paroxysmal Nocturnal Dyspnea  Respiratory:  Cough, Wheezing, Shortness of breath, Chest tightness, Apnea  Gastrointestinal:  Nausea, Vomiting, Diarrhea, Constipation, Heartburn, Blood in stool  Genitourinary:  Difficulty or painful urination, Flank pain, Change in frequency, Urgency  Skin:  Color change, Rash, Itching, Wound  Musculoskeletal:  Joint pain, Back pain, Gait problems, Joint swelling, Myalgias  Neurological:  Dizziness, Headaches, Presyncope, Numbness, Seizures, Tremors  Endocrine:  Heat Intolerance, Cold Intolerance, Polydipsia, Polyphagia, Polyuria      PHYSICAL EXAM:  Vitals:    02/02/22 1009   BP: 118/62   Pulse: 87   Resp: 16   Temp: 97.7 °F (36.5 °C)   TempSrc: Oral   SpO2: 92%   Weight: 222 lb (100.7 kg)   Height: 5' 7\" (1.702 m)     Body mass index is 34.77 kg/m².   Pain Score:   4 (L hip/buttocks)    VS Reviewed  General Appearance: A&O x 3, No acute distress,well developed and well- nourished  Eyes: pupils equal, round, and reactive to light, extraocular eye movements intact, conjunctivae and eye lids without erythema  ENT: external ear and ear canal clear bilaterally, TMs intact and regular, nose without deformity, nasal mucosa and turbinates normal without polyps, oropharynx normal, dentition is normal for age  Neck: supple and non-tender without mass, no thyromegaly or thyroid nodules, no cervical lymphadenopathy  Pulmonary/Chest: clear to auscultation bilaterally- no wheezes, rales or rhonchi, normal air movement, no respiratory distress or retractions  Cardiovascular: S1 and S2 auscultated w/ RRR. No murmurs, rubs, clicks, or gallops, distal pulses intact. Abdomen: soft, non-tender, non-distended, bowel sounds physiologic,  no rebound or guarding, no masses or hernias noted. Liver and spleen without enlargement. Extremities: no cyanosis, clubbing or edema of the lower extremities. Skin: warm and dry, no rash or erythema  Psych: Affect constricted. Mood depressed. Thought process is normal without evidence of psychosis. Good insight and appropriate interaction. Cognition and memory appear to be intact. HIP EXAM:  Examination of the left hip shows: There is not deformity. There is not erythema. There is none pain with internal and external rotation. There is none pain with flexion and extension. There isnone pain with active SLR. ROM full range of motion. Leg lengths: Equal  Trochanteric region is not tender to palpation. Sacral Iliac is not tender to palpation. There is none pain with weight bearing. +TTP L piriformis      Lab Results   Component Value Date    LABA1C 7.7 12/16/2021    LABA1C 7.4 06/02/2021    LABA1C 7.2 11/05/2020    LABA1C 6.8 08/27/2019       ASSESSMENT & PLAN  1. Type 2 diabetes mellitus with microalbuminuria, without long-term current use of insulin (HCC)    Tolerating inc dose metformin  con't at 1000mg bid  F/u 6 wks, a1c then    2. Depression with anxiety    Depression worse  Anxiety stable  Not doing well with 150mg zoloft  Will d/c zoloft  Start lexapro 10mg  F/u 6 wks    - escitalopram (LEXAPRO) 10 MG tablet; Take 1 tablet by mouth daily  Dispense: 30 tablet; Refill: 3    3. Piriformis syndrome of left side    con't PT  Refill zanaflex  Add gabapentin 100mg tid, titrate to TID over 3 days  Xray   F/u 6 wks    - gabapentin (NEURONTIN) 100 MG capsule; Take 1 capsule by mouth 3 times daily for 90 days.  Intended supply: 90 days  Dispense: 90 capsule; Refill: 2  - XR HIP LEFT (2-3 VIEWS); Future  - tiZANidine (ZANAFLEX) 4 MG tablet; Take 1 tablet by mouth every 8 hours as needed (L buttocks pain/spasm)  Dispense: 90 tablet; Refill: 2    4. Cigarette nicotine dependence without complication    In precontemplation stage and not ready to quit. Declines cessation, aware of risks of continued smoking as well as resources available to help quit. These include tobacco cessation classes as well as 1-800-QUIT NOW. No barriers other than lack of desire. 3+ min spent counseling. DISPOSITION    Return in about 6 weeks (around 3/16/2022) for f/u multiple issues, sooner as needed. Ryan Morris released without restrictions. Future Appointments   Date Time Provider Demi Farley   3/16/2022 10:00 AM [id number]     PATIENT COUNSELING    Barriers to learning and self management: none    Discussed use, benefit, and side effects of prescribed medications. Barriers to medication compliance addressed. All patient questions answered. Pt voiced understanding.        Electronically signed by Annmarie Santamaria DO on 2/2/2022 at 10:53 AM

## 2022-02-02 ENCOUNTER — OFFICE VISIT (OUTPATIENT)
Dept: FAMILY MEDICINE CLINIC | Age: 62
End: 2022-02-02
Payer: MEDICARE

## 2022-02-02 VITALS
HEIGHT: 67 IN | DIASTOLIC BLOOD PRESSURE: 62 MMHG | RESPIRATION RATE: 16 BRPM | HEART RATE: 87 BPM | BODY MASS INDEX: 34.84 KG/M2 | SYSTOLIC BLOOD PRESSURE: 118 MMHG | OXYGEN SATURATION: 92 % | WEIGHT: 222 LBS | TEMPERATURE: 97.7 F

## 2022-02-02 DIAGNOSIS — F41.8 DEPRESSION WITH ANXIETY: ICD-10-CM

## 2022-02-02 DIAGNOSIS — G57.02 PIRIFORMIS SYNDROME OF LEFT SIDE: ICD-10-CM

## 2022-02-02 DIAGNOSIS — R80.9 TYPE 2 DIABETES MELLITUS WITH MICROALBUMINURIA, WITHOUT LONG-TERM CURRENT USE OF INSULIN (HCC): Primary | ICD-10-CM

## 2022-02-02 DIAGNOSIS — F17.210 CIGARETTE NICOTINE DEPENDENCE WITHOUT COMPLICATION: ICD-10-CM

## 2022-02-02 DIAGNOSIS — E11.29 TYPE 2 DIABETES MELLITUS WITH MICROALBUMINURIA, WITHOUT LONG-TERM CURRENT USE OF INSULIN (HCC): Primary | ICD-10-CM

## 2022-02-02 PROCEDURE — 99214 OFFICE O/P EST MOD 30 MIN: CPT | Performed by: FAMILY MEDICINE

## 2022-02-02 RX ORDER — GABAPENTIN 100 MG/1
100 CAPSULE ORAL 3 TIMES DAILY
Qty: 90 CAPSULE | Refills: 2 | Status: SHIPPED | OUTPATIENT
Start: 2022-02-02 | End: 2022-05-04

## 2022-02-02 RX ORDER — TIZANIDINE 4 MG/1
4 TABLET ORAL EVERY 8 HOURS PRN
Qty: 90 TABLET | Refills: 2 | Status: SHIPPED | OUTPATIENT
Start: 2022-02-02 | End: 2022-05-04

## 2022-02-02 RX ORDER — ESCITALOPRAM OXALATE 10 MG/1
10 TABLET ORAL DAILY
Qty: 30 TABLET | Refills: 3 | Status: SHIPPED | OUTPATIENT
Start: 2022-02-02 | End: 2022-03-16 | Stop reason: SDUPTHER

## 2022-02-02 NOTE — PATIENT INSTRUCTIONS
Gabapentin Titration:  Day 1 take once  Day 2: take 1 cap twice daily  Day 3 and beyond: take 3 times daily.

## 2022-02-21 DIAGNOSIS — Z86.73 HISTORY OF CVA (CEREBROVASCULAR ACCIDENT): Chronic | ICD-10-CM

## 2022-02-21 RX ORDER — CLOPIDOGREL BISULFATE 75 MG/1
TABLET ORAL
Qty: 90 TABLET | Refills: 3 | Status: SHIPPED | OUTPATIENT
Start: 2022-02-21

## 2022-03-15 ENCOUNTER — HOSPITAL ENCOUNTER (OUTPATIENT)
Dept: GENERAL RADIOLOGY | Age: 62
Discharge: HOME | End: 2022-03-15
Payer: MEDICARE

## 2022-03-15 ENCOUNTER — HOSPITAL ENCOUNTER (OUTPATIENT)
Age: 62
Discharge: HOME | End: 2022-03-15
Payer: MEDICARE

## 2022-03-15 ENCOUNTER — TELEPHONE (OUTPATIENT)
Dept: FAMILY MEDICINE CLINIC | Age: 62
End: 2022-03-15

## 2022-03-15 DIAGNOSIS — G57.02 PIRIFORMIS SYNDROME OF LEFT SIDE: ICD-10-CM

## 2022-03-15 PROCEDURE — 73502 X-RAY EXAM HIP UNI 2-3 VIEWS: CPT

## 2022-03-15 NOTE — TELEPHONE ENCOUNTER
----- Message from Eduardo Vela DO sent at 3/15/2022  1:10 PM EDT -----  Please let pt know that xr L hip shows hip joint itself is WNL  There is some arthritic changes around her tail bone  Will review further at her f/u visit tomorrow  Let me know if questions, thanks!

## 2022-03-15 NOTE — PROGRESS NOTES
Chief Complaint   Patient presents with    Follow-up     DM2 and issues as noted below       History obtained from the patient. SUBJECTIVE:  Porfirio Banks is a 62 y.o. female that presents today for     -DM2: On metformin, increased last visit to 1000mg bid d/t a1c rising. Tolerating well thus far  Working on some diet changes  a1c down to 6.7      -Depression/anxiety PRIOR VISIT:  On zoloft and busar  Both are worse the last 6 wks    UPDATE LAST VISIT:   We inc zoloft to 150mg last visit from 100mg  However, feels depression worse, not better  Having some side effects too  Anxiety ok  Was on wellbutrin in the remote past. But has been on some dose of zoloft/buspar since at least 2019  Denies SI/HI     UPDATE TODAY:   On lexapro 10mg, changed form zoloft 150mg  On buspar yet  Anxiety stable and controlled  Depression doing much better  Would like to con't at 10mg lexapro  No SI/hI       -L hip pain PRIOR VISIT:  Started last wk Saturday  Was moving things around in her room  Better with rest  Worse with movement  No fall or specific injury  Has tried some stretching and ice  Mostly in gluteal muscle. UPDATE PRIOR VISIT:   Doing better  Still with pain on standing, but nearly as bad as before  Done with steroids  Muscle relaxer, Tizanidine, helping. Doing HEP    UPDATE PRIOR VISIT:   Worse again  In L buttocks  Radiates down leg  Better with sitting  Worse with standing     UPDATE LAST VISIT:   Pain a bit worse  Just started PT last wk though  In L buttocks, radiates down leg  Ran out of tizanidine, but was helping when taking.       UPDATE TODAY:   Here for f/u  Completed PT and xray  sxs no better, no worse  Willing to see ortho  Asking for rollator walker to help in the mean time      -Asthma:  Breathing stable  On flovent, not missing a lot of doses       -HTN:     HPI:     Taking meds as prescribed ?: yes  Tolerating well ?: yes  Side Effects ?: denies  BP at home ?: <140/90  Working on [employer] ?: yes  Chest Pain/SOB/Palpitations? denies      -GERD:     HPI:     Taking meds as prescribed ?: yes  Tolerating well ?: yes  Side Effects ?: denies  Dysphagia ?: denies  Odynophagia ?: denies  Melena ?: denies  Working on [employer] ?: yes        -Hx of CVA x 2:  Last was 2010  No residual sxs per pt  No weakness  On full dose asa and plavix  Pt not sure why she is on DAPT  On Lipitor      -Urge urinary incontinence: On ditropan  Works well  Keep sxs well controlled        -Vit d def: On supplementation  Tolerating well      -HLD:     HPI:  High on labs  Started on lipitor  F/u labs better     Taking meds as prescribed ?: yes  Tolerating well ?: yes  Side Effects ?: denies  Muscle Pain?: denies  Working on [employer] ?: yes      -Smoking:  Smoking about 1 PPD  Not quite ready to quit      Age/Gender Health Maintenance    Lipid -   Lab Results   Component Value Date    CHOL 111 12/15/2021    CHOL 209 (H) 06/29/2021    CHOL 200 (H) 01/08/2019     Lab Results   Component Value Date    TRIG 166 12/15/2021    TRIG 288 (H) 06/29/2021    TRIG 287 (H) 01/08/2019     Lab Results   Component Value Date    HDL 33 12/15/2021    HDL 30 06/29/2021    HDL 34 01/08/2019     Lab Results   Component Value Date    LDLCALC 45 12/15/2021    [address] 121 06/29/2021    [address] 109 01/08/2019     No results found for: LABVLDL, VLDL  No results found for: CHOLHDLRATIO      TSH -   Lab Results   Component Value Date    TSH 4.000 06/29/2021       Colon Cancer Screening - + multiple polyps DEC 2016, repeat 3 years per Jennifer SEBASTIAN.  Pt to call to schedule (June 2021/july 2021/DEC 2021/FEB 2022/MARCH 2022)    Lung Cancer Screening (Age 54 to [de-identified] with 30 pack year hx, current smoker or quit within past 15 years) - NEG JAN 2022    Tetanus - to get at pharmacy per medicare rules  Influenza Vaccine - UTD FALL 2021  Pneumonia Vaccine - UTD NOV 2016, PPV 23  Zoster - to get at pharmacy per medicare rules     Breast Cancer Screening - NEG MAY 2021  Cervical Cancer Screening - scheduled APR 2022  Osteoporosis Screening - age 73      Diabetes Health Maintenance    A1C -   Lab Results   Component Value Date    LABA1C 6.7 03/16/2022    LABA1C 7.7 12/16/2021    LABA1C 7.4 06/02/2021    LABA1C 7.2 11/05/2020    LABA1C 6.8 08/27/2019     ACE/ARB - yes, benazapril  Eye - records pending  Foot - UTD DEC 2021  ASA - yes    Microal/Cr -   Component      Latest Ref Rng & Units 6/29/2021          10:01 AM   Protein, Urine      mg/dl 23.3   Creatinine, Urine      mg/dl 144.3   Prot/Creat Ratio, Ur       0.16     Lab Results   Component Value Date    LABMICR 3.96 06/29/2021    LABCREA 233.2 01/08/2019    MACRR 232 (H) 01/08/2019       eGFR -   No results found for: GFRESTIMATE  Lab Results   Component Value Date    LABGLOM 73 06/29/2021       Statin - yes, lipitor, 40 mg qhs      Current Outpatient Medications   Medication Sig Dispense Refill    escitalopram (LEXAPRO) 10 MG tablet Take 1 tablet by mouth daily 90 tablet 3    Misc. Devices (WALKER) MISC Wheeled walker with a seat (Rollator) 1 each 0    clopidogrel (PLAVIX) 75 MG tablet TAKE 1 TABLET BY MOUTH ONCE DAILY 90 tablet 3    gabapentin (NEURONTIN) 100 MG capsule Take 1 capsule by mouth 3 times daily for 90 days.  Intended supply: 90 days 90 capsule 2    tiZANidine (ZANAFLEX) 4 MG tablet Take 1 tablet by mouth every 8 hours as needed (L buttocks pain/spasm) 90 tablet 2    busPIRone (BUSPAR) 15 MG tablet Take 15 mg by mouth 2 times daily 180 tablet 3    metFORMIN (GLUCOPHAGE) 1000 MG tablet Take 1 tablet by mouth 2 times daily (with meals) 180 tablet 3    atorvastatin (LIPITOR) 40 MG tablet Take 1 tablet by mouth daily 90 tablet 3    atenolol (TENORMIN) 25 MG tablet TAKE 1 TABLET BY MOUTH ONCE DAILY 90 tablet 3    oxybutynin (DITROPAN-XL) 10 MG extended release tablet TAKE 1 TABLET BY MOUTH ONCE DAILY 90 tablet 3    albuterol sulfate HFA (PROAIR HFA) 108 (90 Base) MCG/ACT inhaler Inhale 2 puffs into the lungs every 4 hours as needed for Wheezing or Shortness of Breath 2 Inhaler 3    fluticasone (FLOVENT HFA) 110 MCG/ACT inhaler Inhale 1 puff into the lungs 2 times daily 3 Inhaler 3    amLODIPine-benazepril (LOTREL) 10-20 MG per capsule TAKE 1 CAPSULE BY MOUTH ONCE DAILY 90 capsule 3    omega-3 acid ethyl esters (LOVAZA) 1 g capsule       Blood Glucose Monitoring Suppl KIT Use As Directed 1 kit 0    blood glucose monitor strips Use to check sugars twice daily 300 strip 3    Lancets MISC Use to check sugars twice daily 300 each 3    hydrOXYzine (ATARAX) 25 MG tablet Take 1 tablet by mouth nightly as needed for Anxiety 90 tablet 2    lansoprazole (PREVACID SOLUTAB) 30 MG disintegrating tablet Take 30 mg by mouth daily      vitamin D (CHOLECALCIFEROL) 1000 UNITS TABS tablet Take 2,000 Units by mouth daily       aspirin 325 MG tablet Take 325 mg by mouth daily. No current facility-administered medications for this visit. Orders Placed This Encounter   Medications    escitalopram (LEXAPRO) 10 MG tablet     Sig: Take 1 tablet by mouth daily     Dispense:  90 tablet     Refill:  3    Misc. Devices (WALKER) MISC     Sig: Wheeled walker with a seat (Rollator)     Dispense:  1 each     Refill:  0         All medications reviewed and reconciled, including OTC and herbal medications. Updated list given to patient.        Patient Active Problem List    Diagnosis Date Noted    Dyslipidemia     Asthma, mild persistent     Depression with anxiety     DM2 [facility]diabetes mellitus, type 2) ([facility]     GERD (gastroesophageal reflux disease)     Glaucoma     History of CVA (cerebrovascular accident)      x 2 per pt; last was ~2010      Microalbuminuria due to type 2 diabetes mellitus ([facility]     Nicotine dependence     Umbilical hernia      as a complication  after cholecystectomy per pt      Urge urinary incontinence     Hypertension, essential        Past Medical History:   Diagnosis Date    Asthma, mild persistent     Depression with anxiety     [facility] mellitus, type 2) [facility]     Dyslipidemia     GERD (gastroesophageal reflux disease)     Glaucoma     History of CVA (cerebrovascular accident)     x 2 per pt; last was ~2010    Hypertension, essential     Microalbuminuria due to type 2 diabetes mellitus ([facility]     Nicotine dependence     Umbilical hernia     as a complication  after cholecystectomy per pt    Urge urinary incontinence        Past Surgical History:   Procedure Laterality Date    CATARACT REMOVAL WITH IMPLANT Bilateral     CHOLECYSTECTOMY      COLONOSCOPY  2014, 2016    FOOT SURGERY Right     10/17/13, hardware placed and removed    TONSILLECTOMY      UPPER GASTROINTESTINAL ENDOSCOPY  2014, 2016       Allergies   Allergen Reactions    Latex        Social History     Tobacco Use    Smoking status: Current Every Day Smoker     Packs/day: 1.00     Years: 33.00     Pack years: 33.00    Smokeless tobacco: Never Used   Substance Use Topics    Alcohol use: No       Family History   Problem Relation Age of Onset    Diabetes Mother     Glaucoma Mother     Heart Attack Father     Diabetes Sister     Breast Cancer Sister     Diabetes Brother     Colon Polyps Brother 52    Colon Polyps Sister 54    Colon Cancer Neg Hx          I have reviewed the patient's past medical history, past surgical history, allergies, medications, social and family history and I have made updates where appropriate.       Review of Systems  Positive responses are highlighted in bold    Constitutional:  Fever, Chills, Night Sweats, Fatigue, Unexpected changes in weight  HENT:  Ear pain, Tinnitus, Nosebleeds, Trouble swallowing, Hearing loss, Sore throat  Cardiovascular:  Chest Pain, Palpitations, Orthopnea, Paroxysmal Nocturnal Dyspnea  Respiratory:  Cough, Wheezing, Shortness of breath, Chest tightness, Apnea  Gastrointestinal:  Nausea, Vomiting, Diarrhea, Constipation, Heartburn, Blood in stool  Genitourinary:  Difficulty or painful urination, Flank pain, Change in frequency, Urgency  Skin:  Color change, Rash, Itching, Wound  Musculoskeletal:  Joint pain, Back pain, Gait problems, Joint swelling, Myalgias  Neurological:  Dizziness, Headaches, Presyncope, Numbness, Seizures, Tremors  Endocrine:  Heat Intolerance, Cold Intolerance, Polydipsia, Polyphagia, Polyuria      PHYSICAL EXAM:  Vitals:    03/16/22 1004   BP: 110/65   Pulse: 55   Resp: 18   Temp: 97.5 °F (36.4 °C)   TempSrc: Oral   SpO2: 93%   Weight: 224 lb (101.6 kg)   Height: 5' 7\" (1.702 m)     Body mass index is 35.08 kg/m². VS Reviewed  General Appearance: A&O x 3, No acute distress,well developed and well- nourished  Eyes: pupils equal, round, and reactive to light, extraocular eye movements intact, conjunctivae and eye lids without erythema  ENT: external ear and ear canal clear bilaterally, TMs intact and regular, nose without deformity, nasal mucosa and turbinates normal without polyps, oropharynx normal, dentition is normal for age  Neck: supple and non-tender without mass, no thyromegaly or thyroid nodules, no cervical lymphadenopathy  Pulmonary/Chest: clear to auscultation bilaterally- no wheezes, rales or rhonchi, normal air movement, no respiratory distress or retractions  Cardiovascular: S1 and S2 auscultated w/ RRR. No murmurs, rubs, clicks, or gallops, distal pulses intact. Abdomen: soft, non-tender, non-distended, bowel sounds physiologic,  no rebound or guarding, no masses or hernias noted. Liver and spleen without enlargement. Extremities: no cyanosis, clubbing or edema of the lower extremities. Skin: warm and dry, no rash or erythema  Psych: Affect appropriate. Mood euthymic. Thought process is normal without evidence of psychosis. Good insight and appropriate interaction. Cognition and memory appear to be intact. HIP EXAM:  Examination of the left hip shows: There is not deformity. There is not erythema.   There is none pain with internal and external rotation. There is none pain with flexion and extension. There isnone pain with active SLR. ROM full range of motion. Leg lengths: Equal  Trochanteric region is not tender to palpation. Sacral Iliac is not tender to palpation. There is none pain with weight bearing. +TTP L piriformis      Results for POC orders placed in visit on 03/16/22   POCT glycosylated hemoglobin (Hb A1C)   Result Value Ref Range    Hemoglobin A1C 6.7 (H) 4.3 - 5.7 %       Lab Results   Component Value Date    LABA1C 6.7 03/16/2022    LABA1C 7.7 12/16/2021    LABA1C 7.4 06/02/2021    LABA1C 7.2 11/05/2020    LABA1C 6.8 08/27/2019       Narrative   PROCEDURE: XR HIP LEFT (2-3 VIEWS)       CLINICAL INFORMATION: Piriformis syndrome of left side       COMPARISON: No prior study.       TECHNIQUE: AP and frog-leg views of the hip were obtained.       FINDINGS: No fracture or dislocation is seen. Normal abduction of the hip is demonstrated. There are mild degenerative changes left SI joint.           Impression   Normal hip. Degenerative changes left SI joint.               **This report has been created using voice recognition software.  It may contain minor errors which are inherent in voice recognition technology. **       Final report electronically signed by Dr. Mauri Mckeon on 3/15/2022 10:32 AM       ASSESSMENT & PLAN  1. Type 2 diabetes mellitus with microalbuminuria, without long-term current use of insulin (HCC)    Improved  At goal  con't metformin 1000mg bid    - POCT glycosylated hemoglobin (Hb A1C)    2. Depression with anxiety    Improved  con't lexapro at 10mg and buspar    - escitalopram (LEXAPRO) 10 MG tablet; Take 1 tablet by mouth daily  Dispense: 90 tablet; Refill: 3    3.  Piriformis syndrome of left side    No better after PT  Xray with OA L SI joint  Will refer to [address] with Rollator    Stanley Jay was evaluated today and a DME order was entered for a wheeled walker with seat because she requires this to successfully complete daily living tasks of eating, bathing, toileting, personal cares, ambulating, grooming, hygiene, dressing upper body, dressing lower body, meal preparation and taking own medications. A wheeled walker with seat is necessary due to the patient's unsteady gait, upper body weakness, inability to  and ambulation device, ambulating only short distances by pushing a walker, and the need to sit for a short time before resuming ambulation. These tasks cannot be completed with a lesser ambulation device such as a cane, crutch, or standard walker. The need for this equipment was discussed with the patient and she understands and is in agreement. - [facility] Center[facility]ordana MD, Orthopedic Surgery, [facility]. Devices (Kristopher Snare) MISC; Wheeled walker with a seat (Rollator)  Dispense: 1 each; Refill: 0    4. Pain of left sacroiliac joint    - Jordana Guthrie MD, Orthopedic Surgery, [facility]. Devices (Kristopher Snare) MISC; Wheeled walker with a seat (Rollator)  Dispense: 1 each; Refill: 0    5. Mild persistent asthma without complication    Stable  con't Flovent and prn albuterol    6. Hypertension, essential    Stable  At goal  con't norvasc, atenolol    7. Gastroesophageal reflux disease, unspecified whether esophagitis present    Stable  con't lansoprazole    8. History of CVA (cerebrovascular accident)    Stable  con't tertiary prevention    9. Urge urinary incontinence    Stable  con't oxybutynin    10. Vitamin D deficiency    Stable  con't supplementation    11. Dyslipidemia    Stable  con't lipitor    12. Cigarette nicotine dependence without complication    In precontemplation stage and not ready to quit. Declines cessation, aware of risks of continued smoking as well as resources available to help quit. These include tobacco cessation classes as well as 1-800-QUIT NOW. No barriers other than lack of desire. 3+ min spent counseling.        DISPOSITION    Return in about 4 weeks (around 4/13/2022) for PAP SMEAR, sooner as needed. Rj Aceves released without restrictions. Future Appointments   Date Time Provider Demi Farley   4/13/2022  9:40 AM [address]     PATIENT COUNSELING    Barriers to learning and self management: none    Discussed use, benefit, and side effects of prescribed medications. Barriers to medication compliance addressed. All patient questions answered. Pt voiced understanding.        Electronically signed by Kika Mello DO on 3/16/2022 at 10:25 AM

## 2022-03-16 ENCOUNTER — OFFICE VISIT (OUTPATIENT)
Dept: FAMILY MEDICINE CLINIC | Age: 62
End: 2022-03-16
Payer: MEDICARE

## 2022-03-16 VITALS
BODY MASS INDEX: 35.16 KG/M2 | SYSTOLIC BLOOD PRESSURE: 110 MMHG | RESPIRATION RATE: 18 BRPM | DIASTOLIC BLOOD PRESSURE: 65 MMHG | OXYGEN SATURATION: 93 % | TEMPERATURE: 97.5 F | HEART RATE: 55 BPM | WEIGHT: 224 LBS | HEIGHT: 67 IN

## 2022-03-16 DIAGNOSIS — E55.9 VITAMIN D DEFICIENCY: ICD-10-CM

## 2022-03-16 DIAGNOSIS — I10 HYPERTENSION, ESSENTIAL: ICD-10-CM

## 2022-03-16 DIAGNOSIS — E11.29 TYPE 2 DIABETES MELLITUS WITH MICROALBUMINURIA, WITHOUT LONG-TERM CURRENT USE OF INSULIN (HCC): Primary | ICD-10-CM

## 2022-03-16 DIAGNOSIS — G57.02 PIRIFORMIS SYNDROME OF LEFT SIDE: ICD-10-CM

## 2022-03-16 DIAGNOSIS — J45.30 MILD PERSISTENT ASTHMA WITHOUT COMPLICATION: ICD-10-CM

## 2022-03-16 DIAGNOSIS — K21.9 GASTROESOPHAGEAL REFLUX DISEASE, UNSPECIFIED WHETHER ESOPHAGITIS PRESENT: ICD-10-CM

## 2022-03-16 DIAGNOSIS — E78.5 DYSLIPIDEMIA: ICD-10-CM

## 2022-03-16 DIAGNOSIS — F41.8 DEPRESSION WITH ANXIETY: ICD-10-CM

## 2022-03-16 DIAGNOSIS — N39.41 URGE URINARY INCONTINENCE: ICD-10-CM

## 2022-03-16 DIAGNOSIS — F17.210 CIGARETTE NICOTINE DEPENDENCE WITHOUT COMPLICATION: ICD-10-CM

## 2022-03-16 DIAGNOSIS — M53.3 PAIN OF LEFT SACROILIAC JOINT: ICD-10-CM

## 2022-03-16 DIAGNOSIS — R80.9 TYPE 2 DIABETES MELLITUS WITH MICROALBUMINURIA, WITHOUT LONG-TERM CURRENT USE OF INSULIN (HCC): Primary | ICD-10-CM

## 2022-03-16 DIAGNOSIS — Z86.73 HISTORY OF CVA (CEREBROVASCULAR ACCIDENT): ICD-10-CM

## 2022-03-16 LAB — HBA1C MFR BLD: 6.7 % (ref 4.3–5.7)

## 2022-03-16 PROCEDURE — 99214 OFFICE O/P EST MOD 30 MIN: CPT | Performed by: FAMILY MEDICINE

## 2022-03-16 PROCEDURE — 3044F HG A1C LEVEL LT 7.0%: CPT | Performed by: FAMILY MEDICINE

## 2022-03-16 RX ORDER — ESCITALOPRAM OXALATE 10 MG/1
10 TABLET ORAL DAILY
Qty: 90 TABLET | Refills: 3 | Status: SHIPPED | OUTPATIENT
Start: 2022-03-16

## 2022-03-16 NOTE — PATIENT INSTRUCTIONS
-Call Dr. Yoel Chirinos office to make f/u apt to schedule your Colonoscopy that you are overdue for.

## 2022-03-22 DIAGNOSIS — I10 HYPERTENSION, ESSENTIAL: ICD-10-CM

## 2022-03-22 RX ORDER — AMLODIPINE BESYLATE AND BENAZEPRIL HYDROCHLORIDE 10; 20 MG/1; MG/1
CAPSULE ORAL
Qty: 90 CAPSULE | Refills: 3 | Status: SHIPPED | OUTPATIENT
Start: 2022-03-22

## 2022-04-12 NOTE — PROGRESS NOTES
Chief Complaint   Patient presents with    Other     Pap     Smoke Inhalation       History obtained from the patient. SUBJECTIVE:  Kim Euceda is a 62 y.o. female that presents today for     -PAP:  Last was at least 12 years ago, thinks was ok  No prior abnormals  No vaginal bleeding or pain or discharge      -Smoking:  Smoking about 1 PPD  Not quite ready to quit      Age/Gender Health Maintenance    Lipid -   Lab Results   Component Value Date    CHOL 111 12/15/2021    CHOL 209 (H) 06/29/2021    CHOL 200 (H) 01/08/2019     Lab Results   Component Value Date    TRIG 166 12/15/2021    TRIG 288 (H) 06/29/2021    TRIG 287 (H) 01/08/2019     Lab Results   Component Value Date    HDL 33 12/15/2021    HDL 30 06/29/2021    HDL 34 01/08/2019     Lab Results   Component Value Date    LDLCALC 45 12/15/2021    [address] 121 06/29/2021    [address] 109 01/08/2019     No results found for: LABVLDL, VLDL  No results found for: CHOLHDLRATIO      TSH -   Lab Results   Component Value Date    TSH 4.000 06/29/2021       Colon Cancer Screening - + multiple polyps DEC 2016, repeat 3 years per Jennifer SEBASTIAN.  Pt to call to schedule (June 2021/july 2021/DEC 2021/FEB 2022/MARCH 2022/APR 2022)    Lung Cancer Screening (Age 54 to [de-identified] with 30 pack year hx, current smoker or quit within past 15 years) - NEG JAN 2022    Tetanus - to get at pharmacy per medicare rules  Influenza Vaccine - UTD FALL 2021  Pneumonia Vaccine - UTD NOV 2016, PPV 23  Zoster - to get at pharmacy per medicare rules     Breast Cancer Screening - NEG MAY 2021  Cervical Cancer Screening - completed APR 2022  Osteoporosis Screening - age 73      Diabetes Health Maintenance    A1C -   Lab Results   Component Value Date    LABA1C 6.7 03/16/2022    LABA1C 7.7 12/16/2021    LABA1C 7.4 06/02/2021    LABA1C 7.2 11/05/2020    LABA1C 6.8 08/27/2019     ACE/ARB - yes, benazapril  Eye - records pending  Foot - UTD DEC 2021  ASA - yes    Microal/Cr -   Component      Latest Ref Rng & Units 6/29/2021          10:01 AM   Protein, Urine      mg/dl 23.3   Creatinine, Urine      mg/dl 144.3   Prot/Creat Ratio, Ur       0.16     Lab Results   Component Value Date    LABMICR 3.96 06/29/2021    LABCREA 233.2 01/08/2019    MACRR 232 (H) 01/08/2019       eGFR -   No results found for: GFRESTIMATE  Lab Results   Component Value Date    LABGLOM 73 06/29/2021       Statin - yes, lipitor, 40 mg qhs      Current Outpatient Medications   Medication Sig Dispense Refill    amLODIPine-benazepril (LOTREL) 10-20 MG per capsule TAKE 1 CAPSULE BY MOUTH ONCE DAILY 90 capsule 3    escitalopram (LEXAPRO) 10 MG tablet Take 1 tablet by mouth daily 90 tablet 3    Misc. Devices (WALKER) MISC Wheeled walker with a seat (Rollator) 1 each 0    clopidogrel (PLAVIX) 75 MG tablet TAKE 1 TABLET BY MOUTH ONCE DAILY 90 tablet 3    gabapentin (NEURONTIN) 100 MG capsule Take 1 capsule by mouth 3 times daily for 90 days.  Intended supply: 90 days 90 capsule 2    tiZANidine (ZANAFLEX) 4 MG tablet Take 1 tablet by mouth every 8 hours as needed (L buttocks pain/spasm) 90 tablet 2    busPIRone (BUSPAR) 15 MG tablet Take 15 mg by mouth 2 times daily 180 tablet 3    metFORMIN (GLUCOPHAGE) 1000 MG tablet Take 1 tablet by mouth 2 times daily (with meals) 180 tablet 3    atorvastatin (LIPITOR) 40 MG tablet Take 1 tablet by mouth daily 90 tablet 3    atenolol (TENORMIN) 25 MG tablet TAKE 1 TABLET BY MOUTH ONCE DAILY 90 tablet 3    oxybutynin (DITROPAN-XL) 10 MG extended release tablet TAKE 1 TABLET BY MOUTH ONCE DAILY 90 tablet 3    albuterol sulfate HFA (PROAIR HFA) 108 (90 Base) MCG/ACT inhaler Inhale 2 puffs into the lungs every 4 hours as needed for Wheezing or Shortness of Breath 2 Inhaler 3    fluticasone (FLOVENT HFA) 110 MCG/ACT inhaler Inhale 1 puff into the lungs 2 times daily 3 Inhaler 3    omega-3 acid ethyl esters (LOVAZA) 1 g capsule       Blood Glucose Monitoring Suppl KIT Use As Directed 1 kit 0    blood glucose monitor strips Use to check sugars twice daily 300 strip 3    Lancets MISC Use to check sugars twice daily 300 each 3    hydrOXYzine (ATARAX) 25 MG tablet Take 1 tablet by mouth nightly as needed for Anxiety 90 tablet 2    lansoprazole (PREVACID SOLUTAB) 30 MG disintegrating tablet Take 30 mg by mouth daily      vitamin D (CHOLECALCIFEROL) 1000 UNITS TABS tablet Take 2,000 Units by mouth daily       aspirin 325 MG tablet Take 325 mg by mouth daily. No current facility-administered medications for this visit. No orders of the defined types were placed in this encounter. All medications reviewed and reconciled, including OTC and herbal medications. Updated list given to patient.        Patient Active Problem List    Diagnosis Date Noted    Dyslipidemia     Asthma, mild persistent     Depression with anxiety     DM2 [facility]diabetes mellitus, type 2) ([facility]     GERD (gastroesophageal reflux disease)     Glaucoma     History of CVA (cerebrovascular accident)      x 2 per pt; last was ~2010      Microalbuminuria due to type 2 diabetes mellitus ([address]     Nicotine dependence     Umbilical hernia      as a complication  after cholecystectomy per pt      Urge urinary incontinence     Hypertension, essential        Past Medical History:   Diagnosis Date    Asthma, mild persistent     Depression with anxiety     DM2 (diabetes mellitus, type 2) (HCC)     Dyslipidemia     GERD (gastroesophageal reflux disease)     Glaucoma     History of CVA (cerebrovascular accident)     x 2 per pt; last was ~2010    Hypertension, essential     Microalbuminuria due to type 2 diabetes mellitus ([address]     Nicotine dependence     Umbilical hernia     as a complication  after cholecystectomy per pt    Urge urinary incontinence        Past Surgical History:   Procedure Laterality Date    CATARACT REMOVAL WITH IMPLANT Bilateral     CHOLECYSTECTOMY      COLONOSCOPY  2014, 2016    FOOT SURGERY Right 10/17/13, hardware placed and removed    TONSILLECTOMY      UPPER GASTROINTESTINAL ENDOSCOPY  2014, 2016       Allergies   Allergen Reactions    Latex        Social History     Tobacco Use    Smoking status: Current Every Day Smoker     Packs/day: 1.00     Years: 33.00     Pack years: 33.00    Smokeless tobacco: Never Used   Substance Use Topics    Alcohol use: No       Family History   Problem Relation Age of Onset    Diabetes Mother     Glaucoma Mother     Heart Attack Father     Diabetes Sister     Breast Cancer Sister     Diabetes Brother     Colon Polyps Brother 52    Colon Polyps Sister 54    Colon Cancer Neg Hx          I have reviewed the patient's past medical history, past surgical history, allergies, medications, social and family history and I have made updates where appropriate. Review of Systems  Positive responses are highlighted in bold    Constitutional:  Fever, Chills, Night Sweats, Fatigue, Unexpected changes in weight  HENT:  Ear pain, Tinnitus, Nosebleeds, Trouble swallowing, Hearing loss, Sore throat  Cardiovascular:  Chest Pain, Palpitations, Orthopnea, Paroxysmal Nocturnal Dyspnea  Respiratory:  Cough, Wheezing, Shortness of breath, Chest tightness, Apnea  Gastrointestinal:  Nausea, Vomiting, Diarrhea, Constipation, Heartburn, Blood in stool  Genitourinary:  Difficulty or painful urination, Flank pain, Change in frequency, Urgency  Skin:  Color change, Rash, Itching, Wound  Musculoskeletal:  Joint pain, Back pain, Gait problems, Joint swelling, Myalgias  Neurological:  Dizziness, Headaches, Presyncope, Numbness, Seizures, Tremors  Endocrine:  Heat Intolerance, Cold Intolerance, Polydipsia, Polyphagia, Polyuria      PHYSICAL EXAM:  Vitals:    04/13/22 0953   BP: 122/60   Pulse: 80   Resp: 16   Temp: 97.5 °F (36.4 °C)   TempSrc: Oral   SpO2: 92%   Weight: 220 lb (99.8 kg)   Height: 5' 6\" (1.676 m)     Body mass index is 35.51 kg/m².        VS Reviewed  General Appearance: A&O x 3, No acute distress,well developed and well- nourished  Eyes: pupils equal, round, and reactive to light, extraocular eye movements intact, conjunctivae and eye lids without erythema  ENT: external ear and ear canal clear bilaterally, TMs intact and regular, nose without deformity, nasal mucosa and turbinates normal without polyps, oropharynx normal, dentition is normal for age  Neck: supple and non-tender without mass, no thyromegaly or thyroid nodules, no cervical lymphadenopathy  Pulmonary/Chest: clear to auscultation bilaterally- no wheezes, rales or rhonchi, normal air movement, no respiratory distress or retractions  Cardiovascular: S1 and S2 auscultated w/ RRR. No murmurs, rubs, clicks, or gallops, distal pulses intact. Abdomen: soft, non-tender, non-distended, bowel sounds physiologic,  no rebound or guarding, no masses or hernias noted. Liver and spleen without enlargement. Extremities: no cyanosis, clubbing or edema of the lower extremities. Skin: warm and dry, no rash or erythema  Pelvic Exam: (LPN Jesús present during examination). External Genitalia: General appearance; normal, Hair distribution; normal, Lesions absent. Urethral Meatus: Size normal, Location normal, Lesions absent, Prolapse absent. Vagina: no abnormal discharge or lesions. Cervix: normal appearing cervix without discharge or lesions. Uterus:  normal size, contour, position, consistency, mobility, non-tender. Adenexa: Masses absent, Tenderness absent, Enlargement absent, Nodularity absent, no tenderness bilaterally. Anus/Perineum:  normal.       Pap Smear Obtained :  Yes        Cultures Obtained:  No       ASSESSMENT & PLAN  1. Screening for cervical cancer    F/u results by phone    - PAP SMEAR; Future    2. Cigarette nicotine dependence without complication    In precontemplation stage and not ready to quit.  Declines cessation, aware of risks of continued smoking as well as resources available to help quit. These include tobacco cessation classes as well as 1-800-QUIT NOW. No barriers other than lack of desire. 3+ min spent counseling. DISPOSITION    Return in about 3 months (around 7/13/2022) for Follow-up Diabetes, follow-up on chronic medical conditions, sooner as needed. Cheryle Christians released without restrictions. PATIENT COUNSELING    Barriers to learning and self management: none    Discussed use, benefit, and side effects of prescribed medications. Barriers to medication compliance addressed. All patient questions answered. Pt voiced understanding.        Electronically signed by Claude Pilot, DO on 4/13/2022 at 10:07 AM

## 2022-04-13 ENCOUNTER — OFFICE VISIT (OUTPATIENT)
Dept: FAMILY MEDICINE CLINIC | Age: 62
End: 2022-04-13
Payer: MEDICARE

## 2022-04-13 VITALS
SYSTOLIC BLOOD PRESSURE: 122 MMHG | WEIGHT: 220 LBS | BODY MASS INDEX: 35.36 KG/M2 | TEMPERATURE: 97.5 F | OXYGEN SATURATION: 92 % | DIASTOLIC BLOOD PRESSURE: 60 MMHG | RESPIRATION RATE: 16 BRPM | HEIGHT: 66 IN | HEART RATE: 80 BPM

## 2022-04-13 DIAGNOSIS — F17.210 CIGARETTE NICOTINE DEPENDENCE WITHOUT COMPLICATION: Primary | ICD-10-CM

## 2022-04-13 DIAGNOSIS — Z12.4 SCREENING FOR CERVICAL CANCER: ICD-10-CM

## 2022-04-13 PROCEDURE — 99213 OFFICE O/P EST LOW 20 MIN: CPT | Performed by: FAMILY MEDICINE

## 2022-04-19 LAB — CYTOLOGY THIN PREP PAP: NORMAL

## 2022-04-20 ENCOUNTER — TELEPHONE (OUTPATIENT)
Dept: FAMILY MEDICINE CLINIC | Age: 62
End: 2022-04-20

## 2022-04-20 NOTE — TELEPHONE ENCOUNTER
----- Message from Margoth Engel, DO sent at 4/19/2022  8:18 PM EDT -----  Please let pt know that PAP is WNL  Let me know if questions, thanks!

## 2022-05-04 DIAGNOSIS — G57.02 PIRIFORMIS SYNDROME OF LEFT SIDE: ICD-10-CM

## 2022-05-04 RX ORDER — TIZANIDINE 4 MG/1
TABLET ORAL
Qty: 90 TABLET | Refills: 2 | Status: SHIPPED | OUTPATIENT
Start: 2022-05-04 | End: 2022-08-09

## 2022-05-04 RX ORDER — GABAPENTIN 100 MG/1
CAPSULE ORAL
Qty: 90 CAPSULE | Refills: 2 | Status: SHIPPED | OUTPATIENT
Start: 2022-05-04 | End: 2022-08-09

## 2022-05-05 ENCOUNTER — TELEPHONE (OUTPATIENT)
Dept: FAMILY MEDICINE CLINIC | Age: 62
End: 2022-05-05

## 2022-05-05 DIAGNOSIS — N39.41 URGE URINARY INCONTINENCE: Primary | Chronic | ICD-10-CM

## 2022-05-05 RX ORDER — OXYBUTYNIN CHLORIDE 10 MG/1
TABLET, EXTENDED RELEASE ORAL
Qty: 90 TABLET | Refills: 3 | Status: SHIPPED | OUTPATIENT
Start: 2022-05-05

## 2022-05-05 NOTE — TELEPHONE ENCOUNTER
----- Message from Paty Bridges sent at 5/5/2022  9:39 AM EDT -----  Subject: Refill Request    QUESTIONS  Name of Medication? oxybutynin (DITROPAN-XL) 10 MG extended release tablet  Patient-reported dosage and instructions? TAKE 1 TABLET BY MOUTH ONCE   DAILY  How many days do you have left? 0  Preferred Pharmacy? [facility] [address] phone number (if available)? [phone]  Additional Information for Provider? Patient is completely out of this   medication and needs refill today  ---------------------------------------------------------------------------  --------------  CALL BACK INFO  What is the best way for the office to contact you? OK to leave message on   voicemail  Preferred Call Back Phone Number? [phone]  ---------------------------------------------------------------------------  --------------  SCRIPT ANSWERS  Relationship to Patient?  Self

## 2022-05-05 NOTE — TELEPHONE ENCOUNTER
Recent Visits  Date Type Provider Dept   04/13/22 Office Visit Agatha Anna, DO [facility]   03/16/22 Office Visit Stephanyjaun Anna, DO [facility]   02/02/22 Office Visit Agatha Anna, DO [facility]   12/16/21 Office Visit Stephanyjaun Anna, DO [facility]   11/02/21 Office Visit Agatha Anna, DO [facility]   10/26/21 Office Visit Stephanyjaun Anna, DO [facility]   07/15/21 Office Visit Stephanyjaun Anna, DO [facility]   06/02/21 Office Visit Agatha Anna, DO [facility]   Showing recent visits within past 540 days with a meds authorizing provider and meeting all other requirements  Future Appointments  No visits were found meeting these conditions. Showing future appointments within next 150 days with a meds authorizing provider and meeting all other requirements      No future appointments.

## 2022-05-05 NOTE — TELEPHONE ENCOUNTER
RF sent  At last visit needed to Return in about 3 months (around 7/13/2022) for Follow-up Diabetes, follow-up on chronic medical conditions, sooner as needed. Apt not made at check out    Please help her schedule    Thanks! Diagnosis Orders   1.  Urge urinary incontinence  oxybutynin (DITROPAN-XL) 10 MG extended release tablet

## 2022-05-06 NOTE — TELEPHONE ENCOUNTER
Patient has been informed and voiced understanding and states that she will call back once she has had a chance to confirm a ride to the appointment

## 2022-05-21 ENCOUNTER — TELEPHONE (OUTPATIENT)
Dept: FAMILY MEDICINE CLINIC | Age: 62
End: 2022-05-21

## 2022-05-21 DIAGNOSIS — I10 HYPERTENSION, ESSENTIAL: Primary | ICD-10-CM

## 2022-05-21 NOTE — LETTER
[address] [place] [address] Road. [place] [address]  Phone: [phone]  Fax: [phone] [facility]        May 25, 2022    [address] [facility] [address]  [address] [facility]      Dear Haprer Grewal: We have made several attempts to contact you by phone and have   been unsuccessful. Please call our office at your earliest convenience  At [phone] opt [phone]. Thank you.       Kike Anderson DO able to do mild housework and go up the flight of stairs with minimum assistance due to h/o  Rheumatoid arthritis

## 2022-05-23 RX ORDER — ATENOLOL 25 MG/1
TABLET ORAL
Qty: 90 TABLET | Refills: 3 | Status: SHIPPED | OUTPATIENT
Start: 2022-05-23 | End: 2022-07-05

## 2022-05-23 NOTE — TELEPHONE ENCOUNTER
Seen 4/13  Needs f/u ~7/13 for f/u DM2 and chronic conditions  Please help get scheduled  RF sent    Thanks! Diagnosis Orders   1.  Hypertension, essential  atenolol (TENORMIN) 25 MG tablet

## 2022-06-17 ENCOUNTER — TELEPHONE (OUTPATIENT)
Dept: FAMILY MEDICINE CLINIC | Age: 62
End: 2022-06-17

## 2022-06-17 DIAGNOSIS — E53.8 B12 DEFICIENCY: ICD-10-CM

## 2022-06-17 DIAGNOSIS — E55.9 VITAMIN D DEFICIENCY: ICD-10-CM

## 2022-06-17 DIAGNOSIS — R80.9 TYPE 2 DIABETES MELLITUS WITH MICROALBUMINURIA, WITHOUT LONG-TERM CURRENT USE OF INSULIN (HCC): Primary | ICD-10-CM

## 2022-06-17 DIAGNOSIS — E11.29 TYPE 2 DIABETES MELLITUS WITH MICROALBUMINURIA, WITHOUT LONG-TERM CURRENT USE OF INSULIN (HCC): Primary | ICD-10-CM

## 2022-06-17 NOTE — TELEPHONE ENCOUNTER
Pt due for fasting labs prior to next apt on 7/13/2022. Please call to have pt complete this. Thanks! ASSESSMENT & PLAN   Diagnosis Orders   1. Type 2 diabetes mellitus with microalbuminuria, without long-term current use of insulin (HCC)  CBC with Auto Differential    Comprehensive Metabolic Panel    Lipid Panel    TSH with Reflex    Microalbumin / Creatinine Urine Ratio    Vitamin B12    Vitamin D 25 Hydroxy   2. B12 deficiency  CBC with Auto Differential    Comprehensive Metabolic Panel    Lipid Panel    TSH with Reflex    Microalbumin / Creatinine Urine Ratio    Vitamin B12    Vitamin D 25 Hydroxy   3.  Vitamin D deficiency  CBC with Auto Differential    Comprehensive Metabolic Panel    Lipid Panel    TSH with Reflex    Microalbumin / Creatinine Urine Ratio    Vitamin B12    Vitamin D 25 Hydroxy     Future Appointments   Date Time Provider Demi Farley   7/13/2022 10:20 AM Akanksha Dumas DO [address] [facility]

## 2022-07-04 DIAGNOSIS — E78.5 DYSLIPIDEMIA: Chronic | ICD-10-CM

## 2022-07-05 DIAGNOSIS — I10 HYPERTENSION, ESSENTIAL: Primary | ICD-10-CM

## 2022-07-05 RX ORDER — ATENOLOL 25 MG/1
25 TABLET ORAL DAILY
Qty: 90 TABLET | Refills: 3 | Status: SHIPPED | OUTPATIENT
Start: 2022-07-05

## 2022-07-05 RX ORDER — ATORVASTATIN CALCIUM 40 MG/1
TABLET, FILM COATED ORAL
Qty: 90 TABLET | Refills: 3 | Status: SHIPPED | OUTPATIENT
Start: 2022-07-05

## 2022-07-05 NOTE — TELEPHONE ENCOUNTER
Recent Visits  Date Type Provider Dept   04/13/22 Office Visit Dylan Bey, DO [facility]   03/16/22 Office Visit Dylan Bey, DO [facility]   02/02/22 Office Visit Dylan Bey, DO [facility]   12/16/21 Office Visit Dylan Bey, DO [facility]   11/02/21 Office Visit Dylan Bey, DO [facility]   10/26/21 Office Visit Dylan Bey, DO [facility]   07/15/21 Office Visit Dylan Bey, DO [facility]   06/02/21 Office Visit Dylan Bey, DO [facility]   Showing recent visits within past 540 days with a meds authorizing provider and meeting all other requirements  Future Appointments  Date Type Provider Dept   07/13/22 Appointment Dylan Bey, DO [facility]   Showing future appointments within next 150 days with a meds authorizing provider and meeting all other requirements    Future Appointments   Date Time Provider Demi Farley   7/13/2022 10:20 AM Dylan Bey, [facility]

## 2022-07-07 ENCOUNTER — NURSE ONLY (OUTPATIENT)
Dept: LAB | Age: 62
End: 2022-07-07

## 2022-07-07 DIAGNOSIS — E53.8 B12 DEFICIENCY: ICD-10-CM

## 2022-07-07 DIAGNOSIS — E11.29 TYPE 2 DIABETES MELLITUS WITH MICROALBUMINURIA, WITHOUT LONG-TERM CURRENT USE OF INSULIN (HCC): ICD-10-CM

## 2022-07-07 DIAGNOSIS — R80.9 TYPE 2 DIABETES MELLITUS WITH MICROALBUMINURIA, WITHOUT LONG-TERM CURRENT USE OF INSULIN (HCC): ICD-10-CM

## 2022-07-07 DIAGNOSIS — E55.9 VITAMIN D DEFICIENCY: ICD-10-CM

## 2022-07-07 LAB
ALBUMIN SERPL-MCNC: 4.5 G/DL (ref 3.5–5.1)
ALP BLD-CCNC: 71 U/L (ref 38–126)
ALT SERPL-CCNC: 12 U/L (ref 11–66)
ANION GAP SERPL CALCULATED.3IONS-SCNC: 17 MEQ/L (ref 8–16)
AST SERPL-CCNC: 16 U/L (ref 5–40)
BASOPHILS # BLD: 0.3 %
BASOPHILS ABSOLUTE: 0 THOU/MM3 (ref 0–0.1)
BILIRUB SERPL-MCNC: 0.4 MG/DL (ref 0.3–1.2)
BUN BLDV-MCNC: 11 MG/DL (ref 7–22)
CALCIUM SERPL-MCNC: 10 MG/DL (ref 8.5–10.5)
CHLORIDE BLD-SCNC: 100 MEQ/L (ref 98–111)
CHOLESTEROL, TOTAL: 138 MG/DL (ref 100–199)
CO2: 24 MEQ/L (ref 23–33)
CREAT SERPL-MCNC: 0.8 MG/DL (ref 0.4–1.2)
CREATININE, URINE: 204.4 MG/DL
EOSINOPHIL # BLD: 1.7 %
EOSINOPHILS ABSOLUTE: 0.2 THOU/MM3 (ref 0–0.4)
ERYTHROCYTE [DISTWIDTH] IN BLOOD BY AUTOMATED COUNT: 16 % (ref 11.5–14.5)
ERYTHROCYTE [DISTWIDTH] IN BLOOD BY AUTOMATED COUNT: 53.3 FL (ref 35–45)
GFR SERPL CREATININE-BSD FRML MDRD: 73 ML/MIN/1.73M2
GLUCOSE BLD-MCNC: 116 MG/DL (ref 70–108)
HCT VFR BLD CALC: 41.5 % (ref 37–47)
HDLC SERPL-MCNC: 35 MG/DL
HEMOGLOBIN: 13 GM/DL (ref 12–16)
IMMATURE GRANS (ABS): 0.04 THOU/MM3 (ref 0–0.07)
IMMATURE GRANULOCYTES: 0.4 %
LDL CHOLESTEROL CALCULATED: 64 MG/DL
LYMPHOCYTES # BLD: 23.9 %
LYMPHOCYTES ABSOLUTE: 2.7 THOU/MM3 (ref 1–4.8)
MCH RBC QN AUTO: 29.1 PG (ref 26–33)
MCHC RBC AUTO-ENTMCNC: 31.3 GM/DL (ref 32.2–35.5)
MCV RBC AUTO: 92.8 FL (ref 81–99)
MICROALBUMIN UR-MCNC: 10.11 MG/DL
MICROALBUMIN/CREAT UR-RTO: 49 MG/G (ref 0–30)
MONOCYTES # BLD: 4.4 %
MONOCYTES ABSOLUTE: 0.5 THOU/MM3 (ref 0.4–1.3)
NUCLEATED RED BLOOD CELLS: 0 /100 WBC
PLATELET # BLD: 280 THOU/MM3 (ref 130–400)
PMV BLD AUTO: 10.3 FL (ref 9.4–12.4)
POTASSIUM SERPL-SCNC: 4.2 MEQ/L (ref 3.5–5.2)
RBC # BLD: 4.47 MILL/MM3 (ref 4.2–5.4)
SEG NEUTROPHILS: 69.3 %
SEGMENTED NEUTROPHILS ABSOLUTE COUNT: 7.8 THOU/MM3 (ref 1.8–7.7)
SODIUM BLD-SCNC: 141 MEQ/L (ref 135–145)
T4 FREE: 1 NG/DL (ref 0.93–1.76)
TOTAL PROTEIN: 6.5 G/DL (ref 6.1–8)
TRIGL SERPL-MCNC: 197 MG/DL (ref 0–199)
TSH SERPL DL<=0.05 MIU/L-ACNC: 4.28 UIU/ML (ref 0.4–4.2)
VITAMIN B-12: 231 PG/ML (ref 211–911)
VITAMIN D 25-HYDROXY: 81 NG/ML (ref 30–100)
WBC # BLD: 11.3 THOU/MM3 (ref 4.8–10.8)

## 2022-07-08 ENCOUNTER — TELEPHONE (OUTPATIENT)
Dept: FAMILY MEDICINE CLINIC | Age: 62
End: 2022-07-08

## 2022-07-08 NOTE — TELEPHONE ENCOUNTER
----- Message from Annette Murray, DO sent at 7/7/2022  9:06 PM EDT -----  Please let pt know that labs are stable and appropriate. Let me know if questions, thanks!

## 2022-07-12 NOTE — PROGRESS NOTES
Chief Complaint   Patient presents with    Follow-up     DM2 and Chronic issues as noted below       History obtained from the patient. SUBJECTIVE:  Dafne Poe is a 65 y.o. female that presents today for     -DM2: On metformin, increased last visit to 1000mg bid d/t a1c rising. Tolerating well thus far  Working on some diet changes  a1c stable at 6.9      -Depression/anxiety PRIOR VISIT:  On zoloft and busar  Both are worse the last 6 wks    UPDATE PRIOR VISIT:   We inc zoloft to 150mg last visit from 100mg  However, feels depression worse, not better  Having some side effects too  Anxiety ok  Was on wellbutrin in the remote past. But has been on some dose of zoloft/buspar since at least 2019  Denies SI/HI     UPDATE TODAY:   On lexapro 10mg, changed form zoloft 150mg  On buspar yet  Anxiety stable and controlled  Depression doing much better  Would like to con't at 10mg lexapro  No SI/hI       -L hip pain PRIOR VISIT:  Started last wk Saturday  Was moving things around in her room  Better with rest  Worse with movement  No fall or specific injury  Has tried some stretching and ice  Mostly in gluteal muscle. UPDATE PRIOR VISIT:   Doing better  Still with pain on standing, but nearly as bad as before  Done with steroids  Muscle relaxer, Tizanidine, helping. Doing HEP    UPDATE PRIOR VISIT:   Worse again  In L buttocks  Radiates down leg  Better with sitting  Worse with standing     UPDATE PRIOR VISIT:   Pain a bit worse  Just started PT last wk though  In L buttocks, radiates down leg  Ran out of tizanidine, but was helping when taking. UPDATE LAST VISIT:   Here for f/u  Completed PT and xray  sxs no better, no worse  Willing to see ortho  Asking for rollator walker to help in the mean time    UPDATE TODAY:   Pt is done with PT  sxs are overall improving.     Had been referred to ortho, but didn't go  Is feeling better, so wants to hold      -Asthma:  Breathing stable  On flovent, not missing a lot of doses      -HTN:     HPI:     Taking meds as prescribed ?: yes  Tolerating well ?: yes  Side Effects ?: denies  BP at home ?: <140/90  Working on TLCS ?: yes  Chest Pain/SOB/Palpitations? denies      -GERD:     HPI:     Taking meds as prescribed ?: yes  Tolerating well ?: yes  Side Effects ?: denies  Dysphagia ?: denies  Odynophagia ?: denies  Melena ?: denies  Working on TLCS ?: yes        -Hx of CVA x 2:  Last was 2010  No residual sxs per pt  No weakness  On full dose asa and plavix  Pt not sure why she is on DAPT  On Lipitor      -Urge urinary incontinence: On ditropan  Works well  Keep sxs well controlled        -Vit d def: On supplementation  Tolerating well      -HLD:     HPI:       Taking meds as prescribed ?: yes  Tolerating well ?: yes  Side Effects ?: denies  Muscle Pain?: denies  Working on TLCS ?: yes      -Smoking:  Smoking about 1 PPD  Not quite ready to quit      Age/Gender Health Maintenance    Lipid -   Lab Results   Component Value Date    CHOL 138 07/07/2022    CHOL 111 12/15/2021    CHOL 209 (H) 06/29/2021     Lab Results   Component Value Date    TRIG 197 07/07/2022    TRIG 166 12/15/2021    TRIG 288 (H) 06/29/2021     Lab Results   Component Value Date    HDL 35 07/07/2022    HDL 33 12/15/2021    HDL 30 06/29/2021     Lab Results   Component Value Date    LDLCALC 64 07/07/2022    [facility] [address] 12/15/2021    [facility] [address] 06/29/2021     No results found for: LABVLDL, VLDL  No results found for: CHOLHDLRATIO      TSH -   Lab Results   Component Value Date    TSH 4.280 (H) 07/07/2022       Colon Cancer Screening - + multiple polyps DEC 2016, repeat 3 years per Kim SEBASTIAN  Pt to call to schedule (June 2021/july 2021/DEC 2021/FEB 2022/MARCH 2022/July 2022)    Lung Cancer Screening - NEG JAN 2022, recheck 1 year    Tetanus - to get at pharmacy per medicare rules  Influenza Vaccine - UTD FALL 2021  Pneumonia Vaccine - UTD NOV 2016, PPV 23/UTD PCV 20 in July 2022  Zoster - to get at pharmacy per medicare rules     Breast Cancer Screening - NEG MAY 2021/pt will schedule July 2022  Cervical Cancer Screening - NEG PAP with NEG HPV APR 2022  Osteoporosis Screening - age 73      Diabetes Health Maintenance    A1C -   Lab Results   Component Value Date/Time    LABA1C 6.9 07/13/2022 09:44 AM    LABA1C 6.7 03/16/2022 09:34 AM    LABA1C 7.7 12/16/2021 09:10 AM    LABA1C 7.4 06/02/2021 10:29 AM    LABA1C 7.2 11/05/2020 12:56 PM    LABA1C 6.8 08/27/2019 02:35 PM     ACE/ARB - yes, benazapril  Eye - records pending  Foot - UTD DEC 2021  ASA - yes    Microal/Cr -   Component      Latest Ref Rng & Units 6/29/2021          10:01 AM   Protein, Urine      mg/dl 23.3   Creatinine, Urine      mg/dl 144.3   Prot/Creat Ratio, Ur       0.16     Lab Results   Component Value Date    LABMICR 10.11 07/07/2022    LABCREA 204.4 07/07/2022    MACRR 49 (H) 07/07/2022       eGFR -   No results found for: GFRESTIMATE  Lab Results   Component Value Date/Time    LABGLOM 73 07/07/2022 11:05 AM       Statin - yes, lipitor, 40 mg qhs      Current Outpatient Medications   Medication Sig Dispense Refill    atorvastatin (LIPITOR) 40 MG tablet take 1 tablet by mouth once daily 90 tablet 3    atenolol (TENORMIN) 25 MG tablet Take 1 tablet by mouth daily 90 tablet 3    oxybutynin (DITROPAN-XL) 10 MG extended release tablet TAKE 1 TABLET BY MOUTH ONCE DAILY 90 tablet 3    tiZANidine (ZANAFLEX) 4 MG tablet take 1 tablet by mouth every 8 hours if needed for muscle spasm 90 tablet 2    gabapentin (NEURONTIN) 100 MG capsule take 1 capsule by mouth three times a day 90 capsule 2    amLODIPine-benazepril (LOTREL) 10-20 MG per capsule TAKE 1 CAPSULE BY MOUTH ONCE DAILY 90 capsule 3    escitalopram (LEXAPRO) 10 MG tablet Take 1 tablet by mouth daily 90 tablet 3    Misc.  Devices (WALKER) MISC Wheeled walker with a seat (Rollator) 1 each 0    clopidogrel (PLAVIX) 75 MG tablet TAKE 1 TABLET BY MOUTH ONCE DAILY 90 tablet 3    busPIRone (diabetes mellitus, type 2) ([facility] [address]     Dyslipidemia     GERD (gastroesophageal reflux disease)     Glaucoma     History of CVA (cerebrovascular accident)     x 2 per pt; last was ~2010    Hypertension, essential     Microalbuminuria due to type 2 diabetes mellitus ([facility] [address]     Nicotine dependence     Umbilical hernia     as a complication  after cholecystectomy per pt    Urge urinary incontinence        Past Surgical History:   Procedure Laterality Date    CATARACT REMOVAL WITH IMPLANT Bilateral     CHOLECYSTECTOMY      COLONOSCOPY  2014, 2016    FOOT SURGERY Right     10/17/13, hardware placed and removed    TONSILLECTOMY      UPPER GASTROINTESTINAL ENDOSCOPY  2014, 2016       Allergies   Allergen Reactions    Latex        Social History     Tobacco Use    Smoking status: Current Every Day Smoker     Packs/day: 1.00     Years: 33.00     Pack years: 33.00    Smokeless tobacco: Never Used   Substance Use Topics    Alcohol use: No       Family History   Problem Relation Age of Onset    Diabetes Mother     Glaucoma Mother     Heart Attack Father     Diabetes Sister     Breast Cancer Sister     Diabetes Brother     Colon Polyps Brother 52    Colon Polyps Sister 54    Colon Cancer Neg Hx          I have reviewed the patient's past medical history, past surgical history, allergies, medications, social and family history and I have made updates where appropriate.       Review of Systems  Positive responses are highlighted in bold    Constitutional:  Fever, Chills, Night Sweats, Fatigue, Unexpected changes in weight  HENT:  Ear pain, Tinnitus, Nosebleeds, Trouble swallowing, Hearing loss, Sore throat  Cardiovascular:  Chest Pain, Palpitations, Orthopnea, Paroxysmal Nocturnal Dyspnea  Respiratory:  Cough, Wheezing, Shortness of breath, Chest tightness, Apnea  Gastrointestinal:  Nausea, Vomiting, Diarrhea, Constipation, Heartburn, Blood in stool  Genitourinary:  Difficulty or painful urination, Flank pain, Change in frequency, Urgency  Skin:  Color change, Rash, Itching, Wound  Musculoskeletal:  Joint pain, Back pain, Gait problems, Joint swelling, Myalgias  Neurological:  Dizziness, Headaches, Presyncope, Numbness, Seizures, Tremors  Endocrine:  Heat Intolerance, Cold Intolerance, Polydipsia, Polyphagia, Polyuria      PHYSICAL EXAM:  Vitals:    07/13/22 1023   BP: 126/66   Pulse: 70   Resp: 14   Temp: 98.1 °F (36.7 °C)   TempSrc: Oral   SpO2: 93%   Weight: 230 lb (104.3 kg)   Height: 5' 7\" (1.702 m)     Body mass index is 36.02 kg/m². VS Reviewed  General Appearance: A&O x 3, No acute distress,well developed and well- nourished  Eyes: pupils equal, round, and reactive to light, extraocular eye movements intact, conjunctivae and eye lids without erythema  ENT: external ear and ear canal clear bilaterally, TMs intact and regular, nose without deformity, nasal mucosa and turbinates normal without polyps, oropharynx normal, dentition is normal for age  Neck: supple and non-tender without mass, no thyromegaly or thyroid nodules, no cervical lymphadenopathy  Pulmonary/Chest: clear to auscultation bilaterally- no wheezes, rales or rhonchi, normal air movement, no respiratory distress or retractions  Cardiovascular: S1 and S2 auscultated w/ RRR. No murmurs, rubs, clicks, or gallops, distal pulses intact. Abdomen: soft, non-tender, non-distended, bowel sounds physiologic,  no rebound or guarding, no masses or hernias noted. Liver and spleen without enlargement. Extremities: no cyanosis, clubbing or edema of the lower extremities. Skin: warm and dry, no rash or erythema  Psych: Affect appropriate. Mood euthymic. Thought process is normal without evidence of psychosis. Good insight and appropriate interaction. Cognition and memory appear to be intact.       Results for POC orders placed in visit on 07/13/22   POCT glycosylated hemoglobin (Hb A1C)   Result Value Ref Range    Hemoglobin A1C 6.9 (H) 4.3 - 5.7 % Lab Results   Component Value Date/Time    LABA1C 6.9 07/13/2022 09:44 AM    LABA1C 6.7 03/16/2022 09:34 AM    LABA1C 7.7 12/16/2021 09:10 AM    LABA1C 7.4 06/02/2021 10:29 AM    LABA1C 7.2 11/05/2020 12:56 PM    LABA1C 6.8 08/27/2019 02:35 PM       ASSESSMENT & PLAN  1. Type 2 diabetes mellitus with microalbuminuria, without long-term current use of insulin (HCC)    Improved  At goal  con't metformin 1000mg bid    - POCT glycosylated hemoglobin (Hb A1C)    2. Depression with anxiety    Stable  Doing well  con't Lexapro and Buspar    3. Piriformis syndrome of left side    Improved from prior  con't HEP  Was referred to ortho, but did not go  F/u any changes    4. Pain of left sacroiliac joint    As above    5. Mild persistent asthma without complication    Stable  con't Flovent and prn albuterol    6. Hypertension, essential    Stable  At goal  con't norvasc, atenolol    7. Gastroesophageal reflux disease, unspecified whether esophagitis present    Stable  con't lansoprazole    8. History of CVA (cerebrovascular accident)    Stable  con't tertiary prevention    9. Urge urinary incontinence    Stable  con't Ditropan    10. Vitamin D deficiency    Stable  con't supplementation    11. Dyslipidemia    Stable  con't Lipitor    12. Cigarette nicotine dependence without complication    In precontemplation stage and not ready to quit. Declines cessation, aware of risks of continued smoking as well as resources available to help quit. These include tobacco cessation classes as well as 1-800-QUIT NOW. No barriers other than lack of desire. 3+ min spent counseling. 13. Need for pneumococcal vaccination    - Pneumococcal, PCV20, PREVNAR 21, (age 25 yrs+), IM, PF      DISPOSITION    Return in about 5 months (around 12/17/2022) for AWV, Follow-up Diabetes, follow-up on chronic medical conditions, sooner as needed. Larissa Briones released without restrictions.     Future Appointments   Date Time Provider Demi Farley 12/13/2022  9:40 AM [id number]     PATIENT COUNSELING    Barriers to learning and self management: none    Discussed use, benefit, and side effects of prescribed medications. Barriers to medication compliance addressed. All patient questions answered. Pt voiced understanding.        Electronically signed by Andres Waldrop DO on 7/13/2022 at 11:26 AM

## 2022-07-13 ENCOUNTER — OFFICE VISIT (OUTPATIENT)
Dept: FAMILY MEDICINE CLINIC | Age: 62
End: 2022-07-13
Payer: MEDICARE

## 2022-07-13 VITALS
DIASTOLIC BLOOD PRESSURE: 66 MMHG | WEIGHT: 230 LBS | RESPIRATION RATE: 14 BRPM | HEART RATE: 70 BPM | SYSTOLIC BLOOD PRESSURE: 126 MMHG | OXYGEN SATURATION: 93 % | TEMPERATURE: 98.1 F | HEIGHT: 67 IN | BODY MASS INDEX: 36.1 KG/M2

## 2022-07-13 DIAGNOSIS — G57.02 PIRIFORMIS SYNDROME OF LEFT SIDE: ICD-10-CM

## 2022-07-13 DIAGNOSIS — Z86.73 HISTORY OF CVA (CEREBROVASCULAR ACCIDENT): ICD-10-CM

## 2022-07-13 DIAGNOSIS — E11.29 TYPE 2 DIABETES MELLITUS WITH MICROALBUMINURIA, WITHOUT LONG-TERM CURRENT USE OF INSULIN (HCC): Primary | ICD-10-CM

## 2022-07-13 DIAGNOSIS — E78.5 DYSLIPIDEMIA: ICD-10-CM

## 2022-07-13 DIAGNOSIS — E55.9 VITAMIN D DEFICIENCY: ICD-10-CM

## 2022-07-13 DIAGNOSIS — R80.9 TYPE 2 DIABETES MELLITUS WITH MICROALBUMINURIA, WITHOUT LONG-TERM CURRENT USE OF INSULIN (HCC): Primary | ICD-10-CM

## 2022-07-13 DIAGNOSIS — F17.210 CIGARETTE NICOTINE DEPENDENCE WITHOUT COMPLICATION: ICD-10-CM

## 2022-07-13 DIAGNOSIS — J45.30 MILD PERSISTENT ASTHMA WITHOUT COMPLICATION: ICD-10-CM

## 2022-07-13 DIAGNOSIS — N39.41 URGE URINARY INCONTINENCE: ICD-10-CM

## 2022-07-13 DIAGNOSIS — M53.3 PAIN OF LEFT SACROILIAC JOINT: ICD-10-CM

## 2022-07-13 DIAGNOSIS — F41.8 DEPRESSION WITH ANXIETY: ICD-10-CM

## 2022-07-13 DIAGNOSIS — K21.9 GASTROESOPHAGEAL REFLUX DISEASE, UNSPECIFIED WHETHER ESOPHAGITIS PRESENT: ICD-10-CM

## 2022-07-13 DIAGNOSIS — I10 HYPERTENSION, ESSENTIAL: ICD-10-CM

## 2022-07-13 DIAGNOSIS — Z23 NEED FOR PNEUMOCOCCAL VACCINATION: ICD-10-CM

## 2022-07-13 LAB — HBA1C MFR BLD: 6.9 % (ref 4.3–5.7)

## 2022-07-13 PROCEDURE — 3044F HG A1C LEVEL LT 7.0%: CPT | Performed by: FAMILY MEDICINE

## 2022-07-13 PROCEDURE — 99214 OFFICE O/P EST MOD 30 MIN: CPT | Performed by: FAMILY MEDICINE

## 2022-07-13 PROCEDURE — 90677 PCV20 VACCINE IM: CPT | Performed by: FAMILY MEDICINE

## 2022-07-13 ASSESSMENT — PATIENT HEALTH QUESTIONNAIRE - PHQ9
SUM OF ALL RESPONSES TO PHQ QUESTIONS 1-9: 1
SUM OF ALL RESPONSES TO PHQ QUESTIONS 1-9: 1
2. FEELING DOWN, DEPRESSED OR HOPELESS: 0
1. LITTLE INTEREST OR PLEASURE IN DOING THINGS: 1
SUM OF ALL RESPONSES TO PHQ9 QUESTIONS 1 & 2: 1
SUM OF ALL RESPONSES TO PHQ QUESTIONS 1-9: 1
SUM OF ALL RESPONSES TO PHQ QUESTIONS 1-9: 1

## 2022-07-13 NOTE — PROGRESS NOTES
Immunization(s) given during visit:    Immunizations Administered     Name Date Dose Route    Pneumococcal conjugate PCV20, PF (Prevnar 20) 7/13/2022 0.5 mL Intramuscular    Site: Deltoid- Right    Lot: [id number]    NDC: 9967-0596-74          Most recent Vaccine Information Sheet dated 8/6/21 given to fay

## 2022-07-15 DIAGNOSIS — F41.8 DEPRESSION WITH ANXIETY: ICD-10-CM

## 2022-07-18 RX ORDER — BUSPIRONE HYDROCHLORIDE 15 MG/1
TABLET ORAL
Qty: 180 TABLET | Refills: 3 | Status: SHIPPED | OUTPATIENT
Start: 2022-07-18 | End: 2022-09-21

## 2022-08-03 DIAGNOSIS — E11.29 TYPE 2 DIABETES MELLITUS WITH MICROALBUMINURIA, WITHOUT LONG-TERM CURRENT USE OF INSULIN (HCC): ICD-10-CM

## 2022-08-03 DIAGNOSIS — R80.9 TYPE 2 DIABETES MELLITUS WITH MICROALBUMINURIA, WITHOUT LONG-TERM CURRENT USE OF INSULIN (HCC): ICD-10-CM

## 2022-08-03 NOTE — TELEPHONE ENCOUNTER
Patient requesting the following to go to [facility] Please approve or refuse Rx request:  Requested Prescriptions     Pending Prescriptions Disp Refills    metFORMIN (GLUCOPHAGE) 1000 MG tablet 180 tablet 3     Sig: Take 1 tablet by mouth in the morning and 1 tablet in the evening. Take with meals.        Next appointment:  12/13/2022

## 2022-08-09 DIAGNOSIS — G57.02 PIRIFORMIS SYNDROME OF LEFT SIDE: ICD-10-CM

## 2022-08-09 RX ORDER — TIZANIDINE 4 MG/1
TABLET ORAL
Qty: 90 TABLET | Refills: 2 | Status: SHIPPED | OUTPATIENT
Start: 2022-08-09

## 2022-08-09 RX ORDER — GABAPENTIN 100 MG/1
CAPSULE ORAL
Qty: 90 CAPSULE | Refills: 2 | Status: SHIPPED | OUTPATIENT
Start: 2022-08-09 | End: 2022-11-07

## 2022-09-21 DIAGNOSIS — F41.8 DEPRESSION WITH ANXIETY: ICD-10-CM

## 2022-09-21 RX ORDER — BUSPIRONE HYDROCHLORIDE 15 MG/1
TABLET ORAL
Qty: 180 TABLET | Refills: 3 | Status: SHIPPED | OUTPATIENT
Start: 2022-09-21

## 2022-09-21 NOTE — TELEPHONE ENCOUNTER
Recent Visits  Date Type Provider Dept   07/13/22 Office Visit Evelin Dec, DO [facility]   04/13/22 Office Visit Evelin Dec, DO [facility]   03/16/22 Office Visit Evelin Dec, DO [facility]   02/02/22 Office Visit Evelin Dec, DO [facility]   12/16/21 Office Visit Evelin Dec, DO [facility]   11/02/21 Office Visit Evelin Dec, DO [facility]   10/26/21 Office Visit Evelin Dec, DO [facility]   07/15/21 Office Visit Evelin Dec, DO [facility]   06/02/21 Office Visit Evelin Dec, DO [facility]   Showing recent visits within past 540 days with a meds authorizing provider and meeting all other requirements  Future Appointments  Date Type Provider Dept   12/13/22 Appointment Evelin Dec, DO [facility]   Showing future appointments within next 150 days with a meds authorizing provider and meeting all other requirements     Future Appointments   Date Time Provider Demi Farley   12/13/2022  9:40 AM Evelin Shaffer, [address] [place] [address]

## 2022-11-22 ENCOUNTER — APPOINTMENT (OUTPATIENT)
Dept: GENERAL RADIOLOGY | Age: 62
End: 2022-11-22
Payer: MEDICARE

## 2022-11-22 ENCOUNTER — HOSPITAL ENCOUNTER (EMERGENCY)
Age: 62
Discharge: HOME | End: 2022-11-22
Attending: EMERGENCY MEDICINE
Payer: MEDICARE

## 2022-11-22 ENCOUNTER — APPOINTMENT (OUTPATIENT)
Dept: CT IMAGING | Age: 62
End: 2022-11-22
Payer: MEDICARE

## 2022-11-22 VITALS
SYSTOLIC BLOOD PRESSURE: 156 MMHG | WEIGHT: 230 LBS | OXYGEN SATURATION: 93 % | TEMPERATURE: 98.8 F | RESPIRATION RATE: 20 BRPM | DIASTOLIC BLOOD PRESSURE: 72 MMHG | HEART RATE: 77 BPM | HEIGHT: 66 IN | BODY MASS INDEX: 36.96 KG/M2

## 2022-11-22 DIAGNOSIS — R42 DIZZINESS: Primary | ICD-10-CM

## 2022-11-22 LAB
ANION GAP SERPL CALCULATED.3IONS-SCNC: 16 MEQ/L (ref 8–16)
BASOPHILS # BLD: 0.3 %
BASOPHILS ABSOLUTE: 0 THOU/MM3 (ref 0–0.1)
BUN BLDV-MCNC: 19 MG/DL (ref 7–22)
C-REACTIVE PROTEIN: 0.43 MG/DL (ref 0–1)
CALCIUM SERPL-MCNC: 9.9 MG/DL (ref 8.5–10.5)
CHLORIDE BLD-SCNC: 98 MEQ/L (ref 98–111)
CO2: 23 MEQ/L (ref 23–33)
CREAT SERPL-MCNC: 1 MG/DL (ref 0.4–1.2)
EOSINOPHIL # BLD: 0.5 %
EOSINOPHILS ABSOLUTE: 0.1 THOU/MM3 (ref 0–0.4)
ERYTHROCYTE [DISTWIDTH] IN BLOOD BY AUTOMATED COUNT: 14.6 % (ref 11.5–14.5)
ERYTHROCYTE [DISTWIDTH] IN BLOOD BY AUTOMATED COUNT: 48.8 FL (ref 35–45)
GFR SERPL CREATININE-BSD FRML MDRD: > 60 ML/MIN/1.73M2
GLUCOSE BLD-MCNC: 178 MG/DL (ref 70–108)
HCT VFR BLD CALC: 43.6 % (ref 37–47)
HEMOGLOBIN: 13.8 GM/DL (ref 12–16)
IMMATURE GRANS (ABS): 0.02 THOU/MM3 (ref 0–0.07)
IMMATURE GRANULOCYTES: 0.2 %
LYMPHOCYTES # BLD: 25.3 %
LYMPHOCYTES ABSOLUTE: 2.6 THOU/MM3 (ref 1–4.8)
MCH RBC QN AUTO: 29.2 PG (ref 26–33)
MCHC RBC AUTO-ENTMCNC: 31.7 GM/DL (ref 32.2–35.5)
MCV RBC AUTO: 92.4 FL (ref 81–99)
MONOCYTES # BLD: 5.2 %
MONOCYTES ABSOLUTE: 0.5 THOU/MM3 (ref 0.4–1.3)
NUCLEATED RED BLOOD CELLS: 0 /100 WBC
OSMOLALITY CALCULATION: 280.5 MOSMOL/KG (ref 275–300)
PLATELET # BLD: 246 THOU/MM3 (ref 130–400)
PMV BLD AUTO: 10.3 FL (ref 9.4–12.4)
POTASSIUM REFLEX MAGNESIUM: 3.9 MEQ/L (ref 3.5–5.2)
PRO-BNP: 98.8 PG/ML (ref 0–900)
RBC # BLD: 4.72 MILL/MM3 (ref 4.2–5.4)
SEG NEUTROPHILS: 68.5 %
SEGMENTED NEUTROPHILS ABSOLUTE COUNT: 6.9 THOU/MM3 (ref 1.8–7.7)
SODIUM BLD-SCNC: 137 MEQ/L (ref 135–145)
TROPONIN T: < 0.01 NG/ML
WBC # BLD: 10.1 THOU/MM3 (ref 4.8–10.8)

## 2022-11-22 PROCEDURE — 80048 BASIC METABOLIC PNL TOTAL CA: CPT

## 2022-11-22 PROCEDURE — 84484 ASSAY OF TROPONIN QUANT: CPT

## 2022-11-22 PROCEDURE — 96361 HYDRATE IV INFUSION ADD-ON: CPT

## 2022-11-22 PROCEDURE — 99285 EMERGENCY DEPT VISIT HI MDM: CPT

## 2022-11-22 PROCEDURE — 96360 HYDRATION IV INFUSION INIT: CPT

## 2022-11-22 PROCEDURE — 36415 COLL VENOUS BLD VENIPUNCTURE: CPT

## 2022-11-22 PROCEDURE — 86140 C-REACTIVE PROTEIN: CPT

## 2022-11-22 PROCEDURE — 70450 CT HEAD/BRAIN W/O DYE: CPT

## 2022-11-22 PROCEDURE — 83880 ASSAY OF NATRIURETIC PEPTIDE: CPT

## 2022-11-22 PROCEDURE — 93005 ELECTROCARDIOGRAM TRACING: CPT | Performed by: EMERGENCY MEDICINE

## 2022-11-22 PROCEDURE — 71045 X-RAY EXAM CHEST 1 VIEW: CPT

## 2022-11-22 PROCEDURE — 2580000003 HC RX 258

## 2022-11-22 PROCEDURE — 85025 COMPLETE CBC W/AUTO DIFF WBC: CPT

## 2022-11-22 RX ORDER — 0.9 % SODIUM CHLORIDE 0.9 %
500 INTRAVENOUS SOLUTION INTRAVENOUS ONCE
Status: COMPLETED | OUTPATIENT
Start: 2022-11-22 | End: 2022-11-22

## 2022-11-22 RX ADMIN — SODIUM CHLORIDE 500 ML: 9 INJECTION, SOLUTION INTRAVENOUS at 19:00

## 2022-11-22 ASSESSMENT — ENCOUNTER SYMPTOMS
EYE PAIN: 0
DIARRHEA: 0
CHEST TIGHTNESS: 0
PHOTOPHOBIA: 0
NAUSEA: 0
SHORTNESS OF BREATH: 0
VOMITING: 0
BLOOD IN STOOL: 0
CONSTIPATION: 0
BACK PAIN: 0

## 2022-11-22 ASSESSMENT — PAIN SCALES - GENERAL: PAINLEVEL_OUTOF10: 0

## 2022-11-22 NOTE — ED PROVIDER NOTES
[facility] ENCOUNTER          Pt Name: Caterina Ford  MRN: [id number]  [place] 1960  Date of evaluation: 11/22/2022  Treating Resident Physician: Patricia Thrasher MD  Supervising Physician: Dr Yosvany Hardin    History obtained from the patient. CHIEF COMPLAINT       Chief Complaint   Patient presents with    Dizziness           HISTORY OF PRESENT ILLNESS    HPI  Caterina Ford is a 65 y.o. female who presents to the emergency department for evaluation of lightheaded and dizziness    Patient says that she has been having some intermittent lightheaded and dizziness today and her friend noted that she was walking abnormally so was concerned she was having mini stroke. Patient is not having chest pain shortness of breath during these episodes denies any numbness or weakness. Patient without any diarrhea. Patient does endorse suprapubic pain nonradiating 5 out of 10 dull and achy. Patient says it is not associated with any dysuria hematuria or increase in urinary frequency. Patient denies any change in appetite or weight. Patient also she does take 3 to 25 mg of aspirin daily for prior strokes and recently stopped taking her Plavix. Patient unsure when she stopped taking it. Patient notes that she did have a fall approximately 3 days ago denies hitting her head denies loss of consciousness. Patient without any headache blurry vision or neck pain. The patient has no other acute complaints at this time. REVIEW OF SYSTEMS   Review of Systems   Constitutional:  Negative for chills, fatigue, fever and unexpected weight change. HENT:  Negative for ear pain and hearing loss. Eyes:  Negative for photophobia, pain and visual disturbance. Respiratory:  Negative for chest tightness and shortness of breath. Cardiovascular:  Negative for chest pain and leg swelling.    Gastrointestinal:  Negative for blood in stool, constipation, diarrhea, nausea and vomiting. Endocrine: Negative for cold intolerance and heat intolerance. Genitourinary:  Negative for difficulty urinating, dysuria, hematuria and vaginal bleeding. Musculoskeletal:  Negative for arthralgias and back pain. Neurological:  Positive for dizziness and light-headedness. Negative for tremors, syncope, facial asymmetry, weakness, numbness and headaches. Psychiatric/Behavioral:  Negative for agitation, confusion and sleep disturbance. PAST MEDICAL AND SURGICAL HISTORY     Past Medical History:   Diagnosis Date    Asthma, mild persistent     Depression with anxiety     DM2 (diabetes mellitus, type 2) ([facility])     Dyslipidemia     GERD (gastroesophageal reflux disease)     Glaucoma     History of CVA (cerebrovascular accident)     x 2 per pt; last was ~2010    Hypertension, essential     Microalbuminuria due to type 2 diabetes mellitus ([facility] [address]     Nicotine dependence     Umbilical hernia     as a complication  after cholecystectomy per pt    Urge urinary incontinence      Past Surgical History:   Procedure Laterality Date    CATARACT REMOVAL WITH IMPLANT Bilateral     CHOLECYSTECTOMY      COLONOSCOPY  2014, 2016    FOOT SURGERY Right     10/17/13, hardware placed and removed    TONSILLECTOMY      UPPER GASTROINTESTINAL ENDOSCOPY  2014, 2016         MEDICATIONS   No current facility-administered medications for this encounter. Current Outpatient Medications:     gabapentin (NEURONTIN) 100 MG capsule, take 1 capsule by mouth three times a day, Disp: 90 capsule, Rfl: 2    tiZANidine (ZANAFLEX) 4 MG tablet, take 1 tablet by mouth every 8 hours if needed for muscle spasm, Disp: 90 tablet, Rfl: 2    busPIRone (BUSPAR) 15 MG tablet, TAKE ONE TABLET BY MOUTH TWICE DAILY @ 9AM & 5PM, Disp: 180 tablet, Rfl: 3    metFORMIN (GLUCOPHAGE) 1000 MG tablet, Take 1 tablet by mouth in the morning and 1 tablet in the evening. Take with meals. , Disp: 180 tablet, Rfl: 3    atorvastatin (LIPITOR) 40 MG tablet, take 1 tablet by mouth once daily, Disp: 90 tablet, Rfl: 3    atenolol (TENORMIN) 25 MG tablet, Take 1 tablet by mouth daily, Disp: 90 tablet, Rfl: 3    oxybutynin (DITROPAN-XL) 10 MG extended release tablet, TAKE 1 TABLET BY MOUTH ONCE DAILY, Disp: 90 tablet, Rfl: 3    amLODIPine-benazepril (LOTREL) 10-20 MG per capsule, TAKE 1 CAPSULE BY MOUTH ONCE DAILY, Disp: 90 capsule, Rfl: 3    escitalopram (LEXAPRO) 10 MG tablet, Take 1 tablet by mouth daily, Disp: 90 tablet, Rfl: 3    Misc. Devices (WALKER) MISC, Wheeled walker with a seat (Rollator), Disp: 1 each, Rfl: 0    clopidogrel (PLAVIX) 75 MG tablet, TAKE 1 TABLET BY MOUTH ONCE DAILY, Disp: 90 tablet, Rfl: 3    albuterol sulfate HFA (PROAIR HFA) 108 (90 Base) MCG/ACT inhaler, Inhale 2 puffs into the lungs every 4 hours as needed for Wheezing or Shortness of Breath, Disp: 2 Inhaler, Rfl: 3    fluticasone (FLOVENT HFA) 110 MCG/ACT inhaler, Inhale 1 puff into the lungs 2 times daily, Disp: 3 Inhaler, Rfl: 3    omega-3 acid ethyl esters (LOVAZA) 1 g capsule, , Disp: , Rfl:     Blood Glucose Monitoring Suppl KIT, Use As Directed, Disp: 1 kit, Rfl: 0    blood glucose monitor strips, Use to check sugars twice daily, Disp: 300 strip, Rfl: 3    Lancets MISC, Use to check sugars twice daily, Disp: 300 each, Rfl: 3    hydrOXYzine (ATARAX) 25 MG tablet, Take 1 tablet by mouth nightly as needed for Anxiety, Disp: 90 tablet, Rfl: 2    lansoprazole (PREVACID SOLUTAB) 30 MG disintegrating tablet, Take 30 mg by mouth daily, Disp: , Rfl:     vitamin D (CHOLECALCIFEROL) 1000 UNITS TABS tablet, Take 2,000 Units by mouth daily , Disp: , Rfl:     aspirin 325 MG tablet, Take 325 mg by mouth daily.   , Disp: , Rfl:       SOCIAL HISTORY     Social History     Social History Narrative    Not on file     Social History     Tobacco Use    Smoking status: Every Day     Packs/day: 1.00     Years: 33.00     Pack years: 33.00     Types: Cigarettes    Smokeless tobacco: Never   Substance Use Topics    Alcohol use: No    Drug use: No         ALLERGIES     Allergies   Allergen Reactions    Latex          FAMILY HISTORY     Family History   Problem Relation Age of Onset    Diabetes Mother     Glaucoma Mother     Heart Attack Father     Diabetes Sister     Breast Cancer Sister     Diabetes Brother     Colon Polyps Brother 52    Colon Polyps Sister 54    Colon Cancer Neg Hx          PREVIOUS RECORDS   Previous records reviewed:  Patient last seen in the emergency department on July 17, 2021 for accidental overdose . PHYSICAL EXAM     ED Triage Vitals   BP Temp Temp src Pulse Resp SpO2 Height Weight   -- -- -- -- -- -- -- --     Initial vital signs and nursing assessment reviewed and normal. Body mass index is 37.12 kg/m². Pulsoximetry is normal per my interpretation. Additional Vital Signs:  Vitals:    11/22/22 1828   BP: (!) 156/72   Pulse:    Resp:    Temp:    SpO2:        Physical Exam  Vitals and nursing note reviewed. Constitutional:       Appearance: She is obese. HENT:      Head: Normocephalic and atraumatic. Mouth/Throat:      Mouth: Mucous membranes are moist.      Pharynx: Oropharynx is clear. Eyes:      Extraocular Movements: Extraocular movements intact. Pupils: Pupils are equal, round, and reactive to light. Cardiovascular:      Rate and Rhythm: Normal rate and regular rhythm. Pulses: Normal pulses. Heart sounds: Normal heart sounds. Pulmonary:      Effort: Pulmonary effort is normal.      Breath sounds: Normal breath sounds. Abdominal:      General: Bowel sounds are normal.      Palpations: Abdomen is soft. Skin:     General: Skin is warm and dry. Neurological:      General: No focal deficit present. Mental Status: She is alert and oriented to person, place, and time. MEDICAL DECISION MAKING   Initial Assessment:   58-year-old female presenting with lightheadedness and dizziness acute onset. Currently asymptomatic.   NIH score is a 0.  Patient has history of prior strokes with no residual deficits. Patient currently taking 325 aspirin daily. Patient recently discontinued Plavix. Patient appears mildly dehydrated with borderline cap refill and dry tacky lips. I suspect patient is likely experiencing some hydration and possible orthostatics. Other items on the differential do include infection including urinary tract infection, pneumonia, ACS, CHF exacerbation. Minimal concern at this time for stroke as patient does not have any truncal ataxia present. Patient with multiple wounds bilateral arms legs as well as across her back that she says are related to her cat biting her. We will get a CRP to rule out any further infection. Plan:   CBC, BMP, troponin, BNP, CRP, chest x-ray, CT head        ED RESULTS   Laboratory results:  Labs Reviewed   CBC WITH AUTO DIFFERENTIAL - Abnormal; Notable for the following components:       Result Value    MCHC 31.7 (*)     RDW-CV 14.6 (*)     RDW-SD 48.8 (*)     All other components within normal limits   BASIC METABOLIC PANEL W/ REFLEX TO MG FOR LOW K - Abnormal; Notable for the following components:    Glucose 178 (*)     All other components within normal limits   COVID-19 & INFLUENZA COMBO   GLOMERULAR FILTRATION RATE, ESTIMATED   TROPONIN   BRAIN NATRIURETIC PEPTIDE   C-REACTIVE PROTEIN   ANION GAP   OSMOLALITY       Radiologic studies results:  CT HEAD WO CONTRAST   Final Result   1. No acute intracranial findings. No intracranial hemorrhage. 2. Similar appearance of chronic infarcts involving the left frontal and    occipital lobes. 3. Chronic infarct within the right occipital lobe. While this appears    chronic, it is new since imaging performed in 2019. This document has been electronically signed by:  Mazin Casillas MD on    11/22/2022 08:09 PM      All CTs at this facility use dose modulation techniques and iterative    reconstructions, and/or weight-based dosing   when appropriate to reduce radiation to a low as reasonably achievable. XR CHEST PORTABLE   Final Result   1. Hazy medial right basilar opacity may represent atelectasis or    developing pneumonia. 2. Hyperinflation. This document has been electronically signed by: Britton Ridley MD on    11/22/2022 07:35 PM          ED Medications administered this visit:   Medications   0.9 % sodium chloride bolus (0 mLs IntraVENous Stopped 11/22/22 2110)         ED COURSE      Patient's laboratory work-up and imaging is overall unremarkable. Multiple old strokes visible on head CT. Discussed results with patient and advised him that likely her symptoms are related to dehydration versus these old strokes as she has had lightheaded and dizziness recurrent over the last several years. No acute pathology was identified. Patient advised to return the emergency department she has any further concerns or develop symptoms of sudden loss of consciousness, numbness, weakness inability to ambulate as these can be signs of a stroke. Patient is in agreement plan of discharge at this time. Strict return precautions and follow up instructions were discussed with the patient prior to discharge, with which the patient agrees. MEDICATION CHANGES     Discharge Medication List as of 11/22/2022  9:05 PM            FINAL DISPOSITION     Final diagnoses:   Dizziness     Condition: condition: stable  Dispo: Discharge to home      This transcription was electronically signed. Parts of this transcriptions may have been dictated by use of voice recognition software and electronically transcribed, and parts may have been transcribed with the assistance of an ED scribe. The transcription may contain errors not detected in proofreading. Please refer to my supervising physician's documentation if my documentation differs.     Electronically Signed: Mario Franz MD, 11/22/22, 9:14 PM         Rosa Ford MD  Resident  11/22/22 0606

## 2022-11-22 NOTE — ED NOTES
Pt to rm 20 per EMS states she has been dizzy and unsteady on her feet x 2-3 days. Reports she fell 3 days ago, denies injury at that time. Today pt states she walked to her neighbors apt to smoke a cigarette but her friend thought she was walking funny so she called EMS for her.  On arrival pt appears in no acute distress, VSS, denies other needs or concerns      Los Sinclair RN  11/22/22 1531

## 2022-11-23 ENCOUNTER — CARE COORDINATION (OUTPATIENT)
Dept: CARE COORDINATION | Age: 62
End: 2022-11-23

## 2022-11-23 LAB
EKG ATRIAL RATE: 76 BPM
EKG P-R INTERVAL: 172 MS
EKG Q-T INTERVAL: 392 MS
EKG QRS DURATION: 70 MS
EKG QTC CALCULATION (BAZETT): 441 MS
EKG R AXIS: -41 DEGREES
EKG T AXIS: 87 DEGREES
EKG VENTRICULAR RATE: 76 BPM

## 2022-11-23 PROCEDURE — 93010 ELECTROCARDIOGRAM REPORT: CPT | Performed by: INTERNAL MEDICINE

## 2022-11-23 NOTE — CARE COORDINATION
Unable to reach and unable to leave voicemail to complete ED follow up/ [facility] eligible for enrollment.

## 2022-11-23 NOTE — CARE COORDINATION
Unable to reach and unable to leave voicemail to complete ED follow up/ [facility] eligible for enrollment.  2nd attempt

## 2022-11-23 NOTE — DISCHARGE INSTRUCTIONS
You presented emergency department with chief complaint of lightheadedness and dizziness. Your work-up today including CT of your head was negative and you are not currently having a stroke however the CT does demonstrate multiple old strokes from years past.  Some your symptoms may be related to these older strokes. Follow-up your primary care doctor following your visit today and return the emergency department you have any further concerns or develop any neurological symptoms including numbness, weakness, difficulty ambulating, trouble talking or sudden loss of consciousness.

## 2022-12-01 ENCOUNTER — CARE COORDINATION (OUTPATIENT)
Dept: CARE COORDINATION | Age: 62
End: 2022-12-01

## 2022-12-01 NOTE — CARE COORDINATION
Brenda Pires called me back after receiving [employer] message. Informed me Mauri Hendricks is currently at [facility] in [facility] and plan at this time is for SNF at discharge. ACM will not follow up at this time.     PCP appt scheduled for this month has been cancelled per karina request.

## 2022-12-01 NOTE — CARE COORDINATION
Unable to reach and unable to leave voicemail to complete initial call. [facility] eligible for enrollment. 3rd attempt. -[employer] message sent.

## 2022-12-08 DIAGNOSIS — R80.9 TYPE 2 DIABETES MELLITUS WITH MICROALBUMINURIA, WITHOUT LONG-TERM CURRENT USE OF INSULIN (HCC): ICD-10-CM

## 2022-12-08 DIAGNOSIS — E11.29 TYPE 2 DIABETES MELLITUS WITH MICROALBUMINURIA, WITHOUT LONG-TERM CURRENT USE OF INSULIN (HCC): ICD-10-CM

## 2022-12-08 NOTE — TELEPHONE ENCOUNTER
Recent Visits  Date Type Provider Dept   07/13/22 Office Visit Floyd Goldstein, DO [facility]   04/13/22 Office Visit Floyd Goldstein, DO [facility]   03/16/22 Office Visit Floyd Goldstein, DO [facility]   02/02/22 Office Visit Floyd Goldstein, DO [facility]   12/16/21 Office Visit Floyd Goldstein, DO [facility]   11/02/21 Office Visit Floyd Goldstein, DO [facility]   10/26/21 Office Visit Floyd Goldstein, DO [facility]   07/15/21 Office Visit Floyd Goldstein, DO [facility]   Showing recent visits within past 540 days with a meds authorizing provider and meeting all other requirements  Future Appointments  No visits were found meeting these conditions. Showing future appointments within next 150 days with a meds authorizing provider and meeting all other requirements  No future appointments.

## 2022-12-12 ENCOUNTER — APPOINTMENT (OUTPATIENT)
Dept: CT IMAGING | Age: 62
End: 2022-12-12
Payer: MEDICARE

## 2022-12-12 ENCOUNTER — HOSPITAL ENCOUNTER (INPATIENT)
Age: 62
LOS: 5 days | Discharge: NURSING, REHAB OR LONG-TERM CARE | End: 2022-12-17
Attending: EMERGENCY MEDICINE | Admitting: INTERNAL MEDICINE
Payer: MEDICARE

## 2022-12-12 ENCOUNTER — APPOINTMENT (OUTPATIENT)
Dept: GENERAL RADIOLOGY | Age: 62
End: 2022-12-12
Payer: MEDICARE

## 2022-12-12 DIAGNOSIS — F41.9 ANXIETY: ICD-10-CM

## 2022-12-12 DIAGNOSIS — I63.9 CEREBROVASCULAR ACCIDENT (CVA), UNSPECIFIED MECHANISM (HCC): Primary | ICD-10-CM

## 2022-12-12 DIAGNOSIS — R94.31 ABNORMAL QT INTERVAL PRESENT ON ELECTROCARDIOGRAM: ICD-10-CM

## 2022-12-12 LAB
ALBUMIN SERPL-MCNC: 4.1 G/DL (ref 3.5–5.1)
ALP BLD-CCNC: 59 U/L (ref 38–126)
ALT SERPL-CCNC: 17 U/L (ref 11–66)
ANION GAP SERPL CALCULATED.3IONS-SCNC: 14 MEQ/L (ref 8–16)
ANION GAP SERPL CALCULATED.3IONS-SCNC: 16 MEQ/L (ref 8–16)
APTT: 30.5 SECONDS (ref 22–38)
APTT: 31.3 SECONDS (ref 22–38)
AST SERPL-CCNC: 18 U/L (ref 5–40)
BASOPHILS # BLD: 0.4 %
BASOPHILS ABSOLUTE: 0 THOU/MM3 (ref 0–0.1)
BILIRUB SERPL-MCNC: 0.4 MG/DL (ref 0.3–1.2)
BUN BLDV-MCNC: 10 MG/DL (ref 7–22)
BUN BLDV-MCNC: 9 MG/DL (ref 7–22)
CALCIUM SERPL-MCNC: 10 MG/DL (ref 8.5–10.5)
CALCIUM SERPL-MCNC: 9.8 MG/DL (ref 8.5–10.5)
CHLORIDE BLD-SCNC: 100 MEQ/L (ref 98–111)
CHLORIDE BLD-SCNC: 99 MEQ/L (ref 98–111)
CO2: 26 MEQ/L (ref 23–33)
CO2: 27 MEQ/L (ref 23–33)
CREAT SERPL-MCNC: 0.6 MG/DL (ref 0.4–1.2)
CREAT SERPL-MCNC: 0.6 MG/DL (ref 0.4–1.2)
EOSINOPHIL # BLD: 1.2 %
EOSINOPHILS ABSOLUTE: 0.1 THOU/MM3 (ref 0–0.4)
ERYTHROCYTE [DISTWIDTH] IN BLOOD BY AUTOMATED COUNT: 14.6 % (ref 11.5–14.5)
ERYTHROCYTE [DISTWIDTH] IN BLOOD BY AUTOMATED COUNT: 49.9 FL (ref 35–45)
GFR SERPL CREATININE-BSD FRML MDRD: > 60 ML/MIN/1.73M2
GFR SERPL CREATININE-BSD FRML MDRD: > 60 ML/MIN/1.73M2
GLUCOSE BLD-MCNC: 132 MG/DL (ref 70–108)
GLUCOSE BLD-MCNC: 134 MG/DL (ref 70–108)
GLUCOSE BLD-MCNC: 159 MG/DL (ref 70–108)
GLUCOSE BLD-MCNC: 181 MG/DL (ref 70–108)
HCT VFR BLD CALC: 42.7 % (ref 37–47)
HEMOGLOBIN: 13.6 GM/DL (ref 12–16)
IMMATURE GRANS (ABS): 0.02 THOU/MM3 (ref 0–0.07)
IMMATURE GRANULOCYTES: 0.2 %
INR BLD: 0.96 (ref 0.85–1.13)
INR BLD: 0.98 (ref 0.85–1.13)
LACTIC ACID, SEPSIS: 3.2 MMOL/L (ref 0.5–1.9)
LACTIC ACID, SEPSIS: 4.1 MMOL/L (ref 0.5–1.9)
LACTIC ACID: 1.8 MMOL/L (ref 0.5–2)
LYMPHOCYTES # BLD: 20.5 %
LYMPHOCYTES ABSOLUTE: 1.7 THOU/MM3 (ref 1–4.8)
MCH RBC QN AUTO: 29.6 PG (ref 26–33)
MCHC RBC AUTO-ENTMCNC: 31.9 GM/DL (ref 32.2–35.5)
MCV RBC AUTO: 92.8 FL (ref 81–99)
MONOCYTES # BLD: 5 %
MONOCYTES ABSOLUTE: 0.4 THOU/MM3 (ref 0.4–1.3)
MRSA SCREEN RT-PCR: NEGATIVE
NUCLEATED RED BLOOD CELLS: 0 /100 WBC
OSMOLALITY CALCULATION: 281.8 MOSMOL/KG (ref 275–300)
PLATELET # BLD: 269 THOU/MM3 (ref 130–400)
PMV BLD AUTO: 10.9 FL (ref 9.4–12.4)
POC CREATININE WHOLE BLOOD: 0.8 MG/DL (ref 0.5–1.2)
POTASSIUM REFLEX MAGNESIUM: 3.6 MEQ/L (ref 3.5–5.2)
POTASSIUM REFLEX MAGNESIUM: 4 MEQ/L (ref 3.5–5.2)
RBC # BLD: 4.6 MILL/MM3 (ref 4.2–5.4)
SEG NEUTROPHILS: 72.7 %
SEGMENTED NEUTROPHILS ABSOLUTE COUNT: 6 THOU/MM3 (ref 1.8–7.7)
SODIUM BLD-SCNC: 141 MEQ/L (ref 135–145)
SODIUM BLD-SCNC: 141 MEQ/L (ref 135–145)
TOTAL PROTEIN: 6.4 G/DL (ref 6.1–8)
TROPONIN T: < 0.01 NG/ML
TROPONIN T: < 0.01 NG/ML
VANCOMYCIN RESISTANT ENTEROCOCCUS: NEGATIVE
WBC # BLD: 8.3 THOU/MM3 (ref 4.8–10.8)

## 2022-12-12 PROCEDURE — 96365 THER/PROPH/DIAG IV INF INIT: CPT

## 2022-12-12 PROCEDURE — 93005 ELECTROCARDIOGRAM TRACING: CPT | Performed by: EMERGENCY MEDICINE

## 2022-12-12 PROCEDURE — 36415 COLL VENOUS BLD VENIPUNCTURE: CPT

## 2022-12-12 PROCEDURE — 3E03317 INTRODUCTION OF OTHER THROMBOLYTIC INTO PERIPHERAL VEIN, PERCUTANEOUS APPROACH: ICD-10-PCS | Performed by: INTERNAL MEDICINE

## 2022-12-12 PROCEDURE — 85025 COMPLETE CBC W/AUTO DIFF WBC: CPT

## 2022-12-12 PROCEDURE — 70498 CT ANGIOGRAPHY NECK: CPT

## 2022-12-12 PROCEDURE — 4A03X5D MEASUREMENT OF ARTERIAL FLOW, INTRACRANIAL, EXTERNAL APPROACH: ICD-10-PCS | Performed by: INTERNAL MEDICINE

## 2022-12-12 PROCEDURE — 82948 REAGENT STRIP/BLOOD GLUCOSE: CPT

## 2022-12-12 PROCEDURE — 87070 CULTURE OTHR SPECIMN AEROBIC: CPT

## 2022-12-12 PROCEDURE — 6360000002 HC RX W HCPCS: Performed by: STUDENT IN AN ORGANIZED HEALTH CARE EDUCATION/TRAINING PROGRAM

## 2022-12-12 PROCEDURE — 70496 CT ANGIOGRAPHY HEAD: CPT

## 2022-12-12 PROCEDURE — 6360000002 HC RX W HCPCS: Performed by: PSYCHIATRY & NEUROLOGY

## 2022-12-12 PROCEDURE — 6360000002 HC RX W HCPCS: Performed by: FAMILY MEDICINE

## 2022-12-12 PROCEDURE — 6360000004 HC RX CONTRAST MEDICATION

## 2022-12-12 PROCEDURE — 99285 EMERGENCY DEPT VISIT HI MDM: CPT

## 2022-12-12 PROCEDURE — 87641 MR-STAPH DNA AMP PROBE: CPT

## 2022-12-12 PROCEDURE — 96375 TX/PRO/DX INJ NEW DRUG ADDON: CPT

## 2022-12-12 PROCEDURE — 2500000003 HC RX 250 WO HCPCS: Performed by: STUDENT IN AN ORGANIZED HEALTH CARE EDUCATION/TRAINING PROGRAM

## 2022-12-12 PROCEDURE — 85730 THROMBOPLASTIN TIME PARTIAL: CPT

## 2022-12-12 PROCEDURE — 80053 COMPREHEN METABOLIC PANEL: CPT

## 2022-12-12 PROCEDURE — 6360000002 HC RX W HCPCS: Performed by: NURSE PRACTITIONER

## 2022-12-12 PROCEDURE — 87500 VANOMYCIN DNA AMP PROBE: CPT

## 2022-12-12 PROCEDURE — 99291 CRITICAL CARE FIRST HOUR: CPT | Performed by: INTERNAL MEDICINE

## 2022-12-12 PROCEDURE — 70450 CT HEAD/BRAIN W/O DYE: CPT

## 2022-12-12 PROCEDURE — 6370000000 HC RX 637 (ALT 250 FOR IP): Performed by: STUDENT IN AN ORGANIZED HEALTH CARE EDUCATION/TRAINING PROGRAM

## 2022-12-12 PROCEDURE — 6360000002 HC RX W HCPCS

## 2022-12-12 PROCEDURE — 82565 ASSAY OF CREATININE: CPT

## 2022-12-12 PROCEDURE — 85610 PROTHROMBIN TIME: CPT

## 2022-12-12 PROCEDURE — 71045 X-RAY EXAM CHEST 1 VIEW: CPT

## 2022-12-12 PROCEDURE — 83605 ASSAY OF LACTIC ACID: CPT

## 2022-12-12 PROCEDURE — 99222 1ST HOSP IP/OBS MODERATE 55: CPT

## 2022-12-12 PROCEDURE — 2580000003 HC RX 258: Performed by: STUDENT IN AN ORGANIZED HEALTH CARE EDUCATION/TRAINING PROGRAM

## 2022-12-12 PROCEDURE — 84484 ASSAY OF TROPONIN QUANT: CPT

## 2022-12-12 PROCEDURE — 2580000003 HC RX 258: Performed by: PSYCHIATRY & NEUROLOGY

## 2022-12-12 PROCEDURE — 2000000000 HC ICU R&B

## 2022-12-12 RX ORDER — ONDANSETRON 2 MG/ML
2 INJECTION INTRAMUSCULAR; INTRAVENOUS ONCE
Status: COMPLETED | OUTPATIENT
Start: 2022-12-12 | End: 2022-12-12

## 2022-12-12 RX ORDER — ONDANSETRON 4 MG/1
4 TABLET, ORALLY DISINTEGRATING ORAL EVERY 8 HOURS PRN
Status: DISCONTINUED | OUTPATIENT
Start: 2022-12-12 | End: 2022-12-12 | Stop reason: SDUPTHER

## 2022-12-12 RX ORDER — SODIUM CHLORIDE 9 MG/ML
INJECTION, SOLUTION INTRAVENOUS PRN
Status: DISCONTINUED | OUTPATIENT
Start: 2022-12-12 | End: 2022-12-17 | Stop reason: HOSPADM

## 2022-12-12 RX ORDER — DEXTROSE MONOHYDRATE 25 G/50ML
12.5 INJECTION, SOLUTION INTRAVENOUS
Status: ACTIVE | OUTPATIENT
Start: 2022-12-12 | End: 2022-12-13

## 2022-12-12 RX ORDER — ONDANSETRON 2 MG/ML
4 INJECTION INTRAMUSCULAR; INTRAVENOUS ONCE
Status: DISCONTINUED | OUTPATIENT
Start: 2022-12-12 | End: 2022-12-12

## 2022-12-12 RX ORDER — SODIUM CHLORIDE 0.9 % (FLUSH) 0.9 %
10 SYRINGE (ML) INJECTION ONCE
Status: COMPLETED | OUTPATIENT
Start: 2022-12-12 | End: 2022-12-12

## 2022-12-12 RX ORDER — ONDANSETRON 4 MG/1
4 TABLET, ORALLY DISINTEGRATING ORAL EVERY 8 HOURS PRN
Status: DISCONTINUED | OUTPATIENT
Start: 2022-12-12 | End: 2022-12-17 | Stop reason: HOSPADM

## 2022-12-12 RX ORDER — ACETAMINOPHEN 325 MG/1
650 TABLET ORAL EVERY 6 HOURS PRN
Status: DISCONTINUED | OUTPATIENT
Start: 2022-12-12 | End: 2022-12-17 | Stop reason: HOSPADM

## 2022-12-12 RX ORDER — INSULIN LISPRO 100 [IU]/ML
0-4 INJECTION, SOLUTION INTRAVENOUS; SUBCUTANEOUS
Status: DISCONTINUED | OUTPATIENT
Start: 2022-12-12 | End: 2022-12-17 | Stop reason: HOSPADM

## 2022-12-12 RX ORDER — MAGNESIUM SULFATE IN WATER 40 MG/ML
2000 INJECTION, SOLUTION INTRAVENOUS ONCE
Status: COMPLETED | OUTPATIENT
Start: 2022-12-12 | End: 2022-12-12

## 2022-12-12 RX ORDER — ATORVASTATIN CALCIUM 80 MG/1
80 TABLET, FILM COATED ORAL NIGHTLY
Status: DISCONTINUED | OUTPATIENT
Start: 2022-12-12 | End: 2022-12-17 | Stop reason: HOSPADM

## 2022-12-12 RX ORDER — ONDANSETRON 2 MG/ML
4 INJECTION INTRAMUSCULAR; INTRAVENOUS EVERY 6 HOURS PRN
Status: DISCONTINUED | OUTPATIENT
Start: 2022-12-12 | End: 2022-12-12 | Stop reason: SDUPTHER

## 2022-12-12 RX ORDER — POLYETHYLENE GLYCOL 3350 17 G/17G
17 POWDER, FOR SOLUTION ORAL DAILY PRN
Status: DISCONTINUED | OUTPATIENT
Start: 2022-12-12 | End: 2022-12-17 | Stop reason: HOSPADM

## 2022-12-12 RX ORDER — PROMETHAZINE HYDROCHLORIDE 25 MG/ML
25 INJECTION, SOLUTION INTRAMUSCULAR; INTRAVENOUS EVERY 4 HOURS PRN
Status: DISCONTINUED | OUTPATIENT
Start: 2022-12-12 | End: 2022-12-17 | Stop reason: HOSPADM

## 2022-12-12 RX ORDER — DEXTROSE MONOHYDRATE 100 MG/ML
INJECTION, SOLUTION INTRAVENOUS CONTINUOUS PRN
Status: DISCONTINUED | OUTPATIENT
Start: 2022-12-12 | End: 2022-12-17 | Stop reason: HOSPADM

## 2022-12-12 RX ORDER — ACETAMINOPHEN 650 MG/1
650 SUPPOSITORY RECTAL EVERY 6 HOURS PRN
Status: DISCONTINUED | OUTPATIENT
Start: 2022-12-12 | End: 2022-12-17 | Stop reason: HOSPADM

## 2022-12-12 RX ORDER — SODIUM CHLORIDE 0.9 % (FLUSH) 0.9 %
5-40 SYRINGE (ML) INJECTION PRN
Status: DISCONTINUED | OUTPATIENT
Start: 2022-12-12 | End: 2022-12-17 | Stop reason: HOSPADM

## 2022-12-12 RX ORDER — ONDANSETRON 2 MG/ML
4 INJECTION INTRAMUSCULAR; INTRAVENOUS EVERY 6 HOURS PRN
Status: DISCONTINUED | OUTPATIENT
Start: 2022-12-12 | End: 2022-12-17 | Stop reason: HOSPADM

## 2022-12-12 RX ORDER — SODIUM CHLORIDE 0.9 % (FLUSH) 0.9 %
5-40 SYRINGE (ML) INJECTION EVERY 12 HOURS SCHEDULED
Status: DISCONTINUED | OUTPATIENT
Start: 2022-12-12 | End: 2022-12-17 | Stop reason: HOSPADM

## 2022-12-12 RX ORDER — INSULIN LISPRO 100 [IU]/ML
0-4 INJECTION, SOLUTION INTRAVENOUS; SUBCUTANEOUS NIGHTLY
Status: DISCONTINUED | OUTPATIENT
Start: 2022-12-12 | End: 2022-12-17 | Stop reason: HOSPADM

## 2022-12-12 RX ORDER — ENOXAPARIN SODIUM 100 MG/ML
40 INJECTION SUBCUTANEOUS DAILY
Status: DISCONTINUED | OUTPATIENT
Start: 2022-12-13 | End: 2022-12-12

## 2022-12-12 RX ADMIN — SODIUM CHLORIDE 10 MG/HR: 9 INJECTION, SOLUTION INTRAVENOUS at 19:12

## 2022-12-12 RX ADMIN — ONDANSETRON 2 MG: 2 INJECTION INTRAMUSCULAR; INTRAVENOUS at 10:53

## 2022-12-12 RX ADMIN — SODIUM CHLORIDE, PRESERVATIVE FREE 10 ML: 5 INJECTION INTRAVENOUS at 11:08

## 2022-12-12 RX ADMIN — IOPAMIDOL 80 ML: 755 INJECTION, SOLUTION INTRAVENOUS at 11:20

## 2022-12-12 RX ADMIN — ONDANSETRON 4 MG: 2 INJECTION INTRAMUSCULAR; INTRAVENOUS at 15:46

## 2022-12-12 RX ADMIN — SODIUM CHLORIDE 2.5 MG/HR: 9 INJECTION, SOLUTION INTRAVENOUS at 15:57

## 2022-12-12 RX ADMIN — ACETAMINOPHEN 650 MG: 650 SUPPOSITORY RECTAL at 17:53

## 2022-12-12 RX ADMIN — SODIUM CHLORIDE, PRESERVATIVE FREE 10 ML: 5 INJECTION INTRAVENOUS at 11:07

## 2022-12-12 RX ADMIN — SODIUM CHLORIDE, PRESERVATIVE FREE 10 ML: 5 INJECTION INTRAVENOUS at 20:49

## 2022-12-12 RX ADMIN — PROMETHAZINE HYDROCHLORIDE 25 MG: 25 INJECTION INTRAMUSCULAR; INTRAVENOUS at 18:50

## 2022-12-12 RX ADMIN — SODIUM CHLORIDE 2.5 MG/HR: 9 INJECTION, SOLUTION INTRAVENOUS at 22:58

## 2022-12-12 RX ADMIN — ONDANSETRON 4 MG: 2 INJECTION INTRAMUSCULAR; INTRAVENOUS at 21:29

## 2022-12-12 RX ADMIN — MAGNESIUM SULFATE HEPTAHYDRATE 2000 MG: 40 INJECTION, SOLUTION INTRAVENOUS at 11:52

## 2022-12-12 RX ADMIN — TENECTEPLASE 25 MG: KIT at 11:08

## 2022-12-12 ASSESSMENT — PAIN SCALES - GENERAL
PAINLEVEL_OUTOF10: 0
PAINLEVEL_OUTOF10: 10
PAINLEVEL_OUTOF10: 0
PAINLEVEL_OUTOF10: 4
PAINLEVEL_OUTOF10: 1

## 2022-12-12 ASSESSMENT — PAIN DESCRIPTION - LOCATION: LOCATION: HEAD

## 2022-12-12 ASSESSMENT — ENCOUNTER SYMPTOMS
NAUSEA: 1
COUGH: 1
SHORTNESS OF BREATH: 0
VOMITING: 1
ABDOMINAL PAIN: 0
SORE THROAT: 0
PHOTOPHOBIA: 0
RHINORRHEA: 0

## 2022-12-12 ASSESSMENT — PAIN DESCRIPTION - DESCRIPTORS: DESCRIPTORS: ACHING

## 2022-12-12 NOTE — ED NOTES
Reported by EMS that they were called for stroke like symptoms. EMS reports they were told patient was in the middle of therapy when her left arm suddenly became more weak and her speech became slurred at 0930. It is also reported that patient was leaning to the left and not looking to her left. Pt arrives alert and oriented. EMS reports pt left arm was not moving to any painful stimuli and left leg drifted to the bed. EMS gave patient a NIHSS score of 7 prior to arrival to ER. Upon arrival, NIHSS of 6. Pt able to lift left arm, but drifting is noted. It is reported pt vomited at nursing home and in route. Pt nauseous upon arrival to ER. EMS gave pt 4 mg zofran prior to arrival. Glucose checked by EMS prior to arrival reading in the 180's. Pt has a history of CVA and according to EMS, the last one being just last month. Stroke team to pt bedside upon her arrival. Code stroke called and pt transported to ct scan.       Ember Casas RN  12/12/22 1363

## 2022-12-12 NOTE — CONSULTS
Neurology Stroke Alert Note    Date:12/12/2022       Room:[address] [place] [address]  Patient Tess Brown     Date of Birth:[birth year]     Age:62 y.o. Requesting Physician: Christiana Mckenzie MD    Reason for Consult:  Evaluate for stroke alert    Subjective       HPI: Alex Stephens who is a 65 y.o. female with a history of strokes with reported residual left sided weakness, DM2, HLD, HTN who presented to [facility] ED this morning as an EMS activated stroke alert, initiated at 46. Patient comes from a nursing facility who reported last known well at 0925 this am. EMS reports baseline left side weakness and dysarthria secondary to previous stroke with acute worsening of these deficits this morning while working with therapy. EMS reports patient was leaning to the left and neglecting her left visual field. POC glucose 163 in route, per EMS. Initial /85. Noted to be given Zofran while in EMS due to nausea and one  Initial NIH 6  - partial hemianopia L eye, LUE drift, LLE drift, left sided sensory deficit, dysarthria, intermittent command following. While in CT, patient noted to actively move LUE/LLE anti-gravity, seemingly waxing and waning. She is not on current anticoagulation. Outpatient medications include , Lipitor 40, Plavix 75 mg. CT head negative for acute intracranial abnormalities. Cr 0.78. CTA head and neck revealed bilateral ICA occlusions. On-call neuro interventionalist, Dr. Mary Krishnan, on site in [place] and recommended giving TNK. Contraindications for TNK ruled out and TNK administered 1108. BP prior to administration 176/87. Patient stable following administration and transferred back to ED while awaiting ICU bed. MRI WO contrast ordered for 24h following TNK administration.       Last known well:  0925  Time of stroke alert:  46  Time of neurology arrival:  1046  Vascular risk factors:  stroke, HTN, HLD, DM2  Initial glucose: 163 en route, per EMS  Old deficits from prior stroke:  reported left sided weakness, dysarthria  INR in ED if anticoagulated:  not applicable. Initial blood pressure:  166/127, 176/87 prior to TNK administration  Initial NIHSS: 6  Pre-morbid Modified Nicholas Scale:  3  Time of initial imaging read:  1102  Thrombolytic administered:  1108  Thrombectomy performed:  not indicated. Puncture time:  not applicable. Upon further evaluation in the ED following stroke alert, patient alert and oriented. Actively moving LLE anti-gravity, but not LUE. She follows some commands, but I was again unable to assess coordination, as patient did not participate in this part of the exam. She continues to complain of nausea and continues to dry heave and cough. BP during repeat examination 155/75 around 1140. At this time, EMOMs intact in all visual fields without gaze deviation. Care everywhere checked - patient noted to have been evaluated by interventional neurology in [facility] where she was admitted from 11/24-12/2/22 for similar symptoms and MRI brain evidence of large acute to subacute stroke of watershed regions and R occipital lobe. Diagnostic cerebral angiogram completed 12/28/2022 in [place] and revealed complete occlusions of R ICA, L ICA, and R PCA. The decision was made against stent placement at that time. Review of Systems   Review of Systems   Constitutional:  Negative for chills and fever. HENT:  Negative for rhinorrhea and sore throat. Eyes:  Positive for visual disturbance. Negative for photophobia. Respiratory:  Positive for cough. Negative for shortness of breath. Cardiovascular:  Negative for chest pain and palpitations. Gastrointestinal:  Positive for nausea and vomiting. Negative for abdominal pain. Genitourinary:  Negative for dysuria. Musculoskeletal:  Negative for arthralgias and myalgias. Skin:  Negative for rash. Neurological:  Positive for speech difficulty, weakness and numbness.  Negative for dizziness, tremors, seizures, facial asymmetry and headaches. Psychiatric/Behavioral:  Negative for confusion and decreased concentration. The patient is not nervous/anxious. Medications   Scheduled Meds:    magnesium sulfate  2,000 mg IntraVENous Once     Continuous Infusions:   PRN Meds:   Medications Prior to Admission:   No current facility-administered medications on file prior to encounter. Current Outpatient Medications on File Prior to Encounter   Medication Sig Dispense Refill    gabapentin (NEURONTIN) 100 MG capsule take 1 capsule by mouth three times a day 90 capsule 2    tiZANidine (ZANAFLEX) 4 MG tablet take 1 tablet by mouth every 8 hours if needed for muscle spasm 90 tablet 2    metFORMIN (GLUCOPHAGE) 1000 MG tablet take 1 tablet by mouth twice a day with meals 180 tablet 3    busPIRone (BUSPAR) 15 MG tablet TAKE ONE TABLET BY MOUTH TWICE DAILY @ 9AM & 5PM 180 tablet 3    atorvastatin (LIPITOR) 40 MG tablet take 1 tablet by mouth once daily 90 tablet 3    atenolol (TENORMIN) 25 MG tablet Take 1 tablet by mouth daily 90 tablet 3    oxybutynin (DITROPAN-XL) 10 MG extended release tablet TAKE 1 TABLET BY MOUTH ONCE DAILY 90 tablet 3    amLODIPine-benazepril (LOTREL) 10-20 MG per capsule TAKE 1 CAPSULE BY MOUTH ONCE DAILY 90 capsule 3    escitalopram (LEXAPRO) 10 MG tablet Take 1 tablet by mouth daily 90 tablet 3    Misc.  Devices (WALKER) MISC Wheeled walker with a seat (Rollator) 1 each 0    clopidogrel (PLAVIX) 75 MG tablet TAKE 1 TABLET BY MOUTH ONCE DAILY 90 tablet 3    albuterol sulfate HFA (PROAIR HFA) 108 (90 Base) MCG/ACT inhaler Inhale 2 puffs into the lungs every 4 hours as needed for Wheezing or Shortness of Breath 2 Inhaler 3    fluticasone (FLOVENT HFA) 110 MCG/ACT inhaler Inhale 1 puff into the lungs 2 times daily 3 Inhaler 3    omega-3 acid ethyl esters (LOVAZA) 1 g capsule       Blood Glucose Monitoring Suppl KIT Use As Directed 1 kit 0    blood glucose monitor strips Use to check sugars twice daily 300 strip 3    Lancets MISC Use to check sugars twice daily 300 each 3    hydrOXYzine (ATARAX) 25 MG tablet Take 1 tablet by mouth nightly as needed for Anxiety 90 tablet 2    lansoprazole (PREVACID SOLUTAB) 30 MG disintegrating tablet Take 30 mg by mouth daily      vitamin D (CHOLECALCIFEROL) 1000 UNITS TABS tablet Take 2,000 Units by mouth daily       aspirin 325 MG tablet Take 325 mg by mouth daily. Past History    Past Medical History:   has a past medical history of Asthma, mild persistent, Depression with anxiety, DM2 (diabetes mellitus, type 2) ([facility] [address] Dyslipidemia, GERD (gastroesophageal reflux disease), Glaucoma, History of CVA (cerebrovascular accident), Hypertension, essential, Microalbuminuria due to type 2 diabetes mellitus ([facility] [address] Nicotine dependence, Umbilical hernia, and Urge urinary incontinence. Social History:   reports that she has been smoking. She has a 33.00 pack-year smoking history. She has never used smokeless tobacco. She reports that she does not drink alcohol and does not use drugs. Family History:   Family History   Problem Relation Age of Onset    Diabetes Mother     Glaucoma Mother     Heart Attack Father     Diabetes Sister     Breast Cancer Sister     Diabetes Brother     Colon Polyps Brother 52    Colon Polyps Sister 54    Colon Cancer Neg Hx        Physical Examination        NIH Stroke Scale:  1a.  Level of consciousness:  0 - alert; keenly responsive  1b. Level of consciousness questions:  0 - answers both questions correctly  1c. Level of consciousness questions:  1 - performs one task correctly  2. Best Gaze:  0 - normal  3. Visual:  1 - partial hemianopia  4. Facial Palsy:  0 - normal symmetric movement  5a. Motor left arm:  1 - drift, limb holds 90 (or 45) degrees but drifts down before full 10 seconds: does not hit bed  5b. Motor right arm:  0 - no drift, limb holds 90 (or 45) degrees for full 10 seconds  6a.   Motor left le - drift; leg falls by the end of the 5 second period but does not hit bed  6b. Motor right le - no drift; leg holds 30 degree position for full 5 seconds  7. Limb Ataxia:  BARTOLOME  8. Sensory:  1 - mild to moderate sensory loss; patient feels pinprick is less sharp or is dull on the affected side; there is a loss of superficial pain with pinprick but patient is aware of being touched   9. Best Language:  0 - no aphasia, normal  10. Dysarthria:  1 - mild to moderate, patient slurs at least some words and at worst, can be understood with some difficulty  11. Extinction and Inattention:  0 - no abnormality    TOTAL:  6    Vitals:  BP (!) 163/76   Pulse 76   Temp 98.3 °F (36.8 °C)   Resp 14   Wt 195 lb 12.8 oz (88.8 kg)   SpO2 95%   BMI 31.60 kg/m²   Temp (24hrs), Av.3 °F (36.8 °C), Min:98.3 °F (36.8 °C), Max:98.3 °F (36.8 °C)      I/O (24Hr): Intake/Output Summary (Last 24 hours) at 2022 1223  Last data filed at 2022 1108  Gross per 24 hour   Intake 10 ml   Output --   Net 10 ml       Physical Exam  Vitals reviewed. Constitutional:       General: She is not in acute distress. Appearance: She is ill-appearing. HENT:      Head: Normocephalic and atraumatic. Right Ear: External ear normal.      Left Ear: External ear normal.      Nose: Nose normal.      Mouth/Throat:      Mouth: Mucous membranes are dry. Pharynx: Oropharynx is clear. No oropharyngeal exudate or posterior oropharyngeal erythema. Eyes:      Extraocular Movements: EOM normal.      Pupils: Pupils are equal, round, and reactive to light. Comments: Initially would not cross midline to the left visual field, resolved upon repeat examination   Cardiovascular:      Rate and Rhythm: Regular rhythm. Tachycardia present. Heart sounds: Normal heart sounds. No murmur heard. Pulmonary:      Effort: Pulmonary effort is normal. No respiratory distress. Breath sounds: Normal breath sounds. No wheezing.    Abdominal:      General: Bowel sounds are normal.      Palpations: Abdomen is soft. Tenderness: There is no abdominal tenderness. Musculoskeletal:         General: Normal range of motion. Cervical back: Normal range of motion. Right lower leg: No edema. Left lower leg: No edema. Skin:     General: Skin is warm. Findings: No rash. Neurological:      Mental Status: She is alert and oriented to person, place, and time. Comments: Intermittently following commands. Alert and oriented to self, month, year. Psychiatric:         Mood and Affect: Mood normal.         Speech: Speech is slurred. Behavior: Behavior normal.     Neurologic Exam     Mental Status   Oriented to person, place, and time. Oriented to person. Oriented to place. Oriented to time. Follows commands: intermittently follows commands. Attention: decreased. Concentration: decreased. Speech: slurred   Level of consciousness: alert  Able to repeat. Cranial Nerves     CN III, IV, VI   Pupils are equal, round, and reactive to light. Extraocular motions are normal.     CN V   Facial sensation intact. Right facial sensation deficit: none  Left facial sensation deficit: none    CN VII   Facial expression full, symmetric. Right facial weakness: none  Left facial weakness: none    CN VIII   CN VIII normal.   Hearing: intact    CN IX, X   CN IX normal.   CN X normal.   Palate: symmetric    CN XII   CN XII normal.   Tongue: not atrophic  Fasciculations: absent  Tongue deviation: none    CN somewhat limited secondary to inability to follow all commands. Motor Exam   Muscle bulk: normal  Overall muscle tone: normal  Actively moves RUE/RLE anti-gravity without drift. Initial inability to move LUE/LLE anti-gravity upon admission, improved while in CT to LUE/LLE mild drift, worsened again on repeat examination in ED following stroke alert to mild drift of LLE, mild effort against gravity LUE - but unable to move ant-gravity independently. Sensory Exam     Decreased sensation to light touch left upper and lower extremities. Gait, Coordination, and Reflexes   BARTOLOME coordination, no adventitious motor movements noted on exam. No tremors. Labs/Imaging/Diagnostics   Labs:  CBC:  Recent Labs     12/12/22  1125   WBC 8.3   RBC 4.60   HGB 13.6   HCT 42.7   MCV 92.8        CHEMISTRIES:  Recent Labs     12/12/22  1125      K 3.6   CL 99   CO2 26   BUN 9   CREATININE 0.6   GLUCOSE 132*     COAGULATION STUDIES:  Recent Labs     12/12/22  1125   INR 0.96   APTT 30.5     LIVER PROFILE:  Recent Labs     12/12/22  1125   AST 18   ALT 17   BILITOT 0.4   ALKPHOS 59     CHOLESTEROL AND A1C:No results for input(s): LDLCALC, HDL, CHOL, TRIG, LABA1C in the last 720 hours. Imaging Last 24 Hours:  CTA HEAD W WO CONTRAST (CODE STROKE)    Result Date: 12/12/2022  PROCEDURE: CTA HEAD W WO CONTRAST, CTA NECK W WO CONTRAST CLINICAL INFORMATION: STROKE ALERT. Right-sided weakness. Old infarcts on the left. COMPARISON: Head CT 12/12/2022 and 11/22/2022. TECHNIQUE: 1 mm axial images were obtained through the head and neck after the fast bolus administration of contrast. A noncontrast localizer was obtained. 3-D reconstructions were performed on a dedicated 3-D workstation. These include multiplanar MPR images and multiplanar MIP images. Centerline reconstructions were obtained of the carotid systems. Isovue intravenous contrast was given. The [employer] application was used in analyzing the CTA head. An image was sent to PACS. All CT scans at this facility use dose modulation, iterative reconstruction, and/or weight-based dosing when appropriate to reduce radiation dose to as low as reasonably achievable. FINDINGS: CTA NECK: Aortic arch and branches: There is calcified atheromatosis of the aortic arch. There is no stenosis at the origin innominate artery, left common carotid artery or the left subclavian artery.  There is a possible proximal occlusion of the right subclavian artery. This is obscured by artifact from incoming contrast. Right common carotid artery/ICA: There is some calcified atherosclerosis of the right common carotid artery. There is heavy atherosclerosis of the right carotid bulb. The origin of the right external carotid artery is patent. The right internal carotid artery is occluded throughout its cervical segment. Left common carotid artery/ICA: The left common carotid artery is normal. The origin of the left external carotid artery is affected by atherosclerosis. There is no gross stenosis. The proximal left carotid bulb is patent. There is been an abrupt cut off. The distal left carotid bulb is occluded. The left internal carotid artery is occluded. Vertebral arteries: The right vertebral artery is small. This is difficult to follow due to artifact from incoming contrast. The proximal segment is not seen. Distally this is a very small vessel. The left vertebral artery is the dominant vessel. This is  normal throughout. CTA HEAD: Internal carotid arteries: The right internal carotid artery reconstitutes at the level skull base. This is due to collateral flow. There is scattered calcified atheromatosis. There is no stenosis of the cavernous segment. The ophthalmic artery origin is  patent. The left internal carotid artery is diminutive. This is a small vessel there is severe stenosis in its cavernous segment. The ophthalmic artery on the left is patent. Middle cerebral arteries: On the right the MCA and its proximal branches are normal. On the left, the MCA is a small vessel. The M1 and M2 branches are patent. Anterior cerebral arteries: There is a normal right A1 segment of the anterior cerebral artery. A left A1 segment is not identified. Only a single A2 segment is identified. Vertebral arteries: The distal right vertebral artery is diminutive. There is a normal dominant left vertebral artery.  Basilar artery: Normal. Superior cerebellar arteries: Normal. Posterior cerebral arteries: There is a normal large left posterior cerebral artery. There is a normal left posterior communicating artery. There is a small right posterior communicating artery. There is a aplastic right P1 segment. No aneurysms are identified. The superior sagittal sinus, vein of Paresh, internal cerebral veins, straight sinus, transverse sinuses and sigmoid sinuses are patent. Axial source data: There are no suspicious findings the lung apices. There is no upper mediastinal adenopathy. There is a small vague nodule in the right thyroid lobe measuring 1 cm. There is no cervical adenopathy. In the brain, there are old infarcts in both occipital lobes and the left frontal and parietal lobes. 1. Occluded cervical left internal carotid artery. There is an abrupt cut off. This may be an acute occlusion. This cavernous sinus segment is diminutive. 2. Occluded right internal carotid artery. There is a normal cavernous sinus segment. 3. Absent A1 and A2 segments of the left anterior cerebral artery. 4. Patent but small left middle cerebral artery and proximal branches. 5. Possible occlusion of the right subclavian artery at its origin. This is not well seen. The proximal right vertebral artery is not seen. This may also be occluded. These findings were telephoned to Judy Lizama MD at 11:48 AM on 12/12/2022. Naya Rausch **This report has been created using voice recognition software. It may contain minor errors which are inherent in voice recognition technology. ** Final report electronically signed by Dr. Malinda Stevens on 12/12/2022 11:50 AM    CT HEAD WO CONTRAST    Result Date: 12/12/2022  PROCEDURE: CT HEAD WO CONTRAST CLINICAL INFORMATION: [address] [place] [address]. Right-sided weakness. COMPARISON: Head CT 11/22/2022. TECHNIQUE: Noncontrast 5 mm axial images were obtained through the brain. Sagittal and coronal reconstructions were obtained.  All CT scans at this facility use dose modulation, iterative reconstruction, and/or weight-based dosing when appropriate to reduce radiation dose to as low as reasonably achievable. FINDINGS: There is no hemorrhage. There are no intra-or extra-axial collections. There is no hydrocephalus, midline shift or mass effect. There are stable old infarcts in the left frontal lobe and the left parieto-occipital lobe. There is also an old appearing infarct in the right occipital lobe. There is an old infarct in the right medial basal ganglia. There is a moderate amount of abnormal low attenuation in the white matter the brain suggesting chronic small vessel ischemic changes. The paranasal sinuses and mastoid air cells are normally aerated. There is no suspicious calvarial abnormality. 1. No acute findings. 2. Stable appearing old infarcts in the left frontal lobe, both occipital lobes and a portion of the left parietal lobe. 3. Small old infarct in the medial aspect of the right basal ganglia. These findings were telephoned to Ana Michael at 11:02 AM on 12/12/2022 . **This report has been created using voice recognition software. It may contain minor errors which are inherent in voice recognition technology. ** Final report electronically signed by Dr. Laith Moreno on 12/12/2022 11:06 AM    CTA NECK W WO CONTRAST    Result Date: 12/12/2022  PROCEDURE: CTA HEAD W WO CONTRAST, CTA NECK W WO CONTRAST CLINICAL INFORMATION: [address]. Right-sided weakness. Old infarcts on the left. COMPARISON: Head CT 12/12/2022 and 11/22/2022. TECHNIQUE: 1 mm axial images were obtained through the head and neck after the fast bolus administration of contrast. A noncontrast localizer was obtained. 3-D reconstructions were performed on a dedicated 3-D workstation. These include multiplanar MPR images and multiplanar MIP images. Centerline reconstructions were obtained of the carotid systems. Isovue intravenous contrast was given. The [employer] application was used in analyzing the CTA head.  An image was sent to PACS. All CT scans at this facility use dose modulation, iterative reconstruction, and/or weight-based dosing when appropriate to reduce radiation dose to as low as reasonably achievable. FINDINGS: CTA NECK: Aortic arch and branches: There is calcified atheromatosis of the aortic arch. There is no stenosis at the origin innominate artery, left common carotid artery or the left subclavian artery. There is a possible proximal occlusion of the right subclavian artery. This is obscured by artifact from incoming contrast. Right common carotid artery/ICA: There is some calcified atherosclerosis of the right common carotid artery. There is heavy atherosclerosis of the right carotid bulb. The origin of the right external carotid artery is patent. The right internal carotid artery is occluded throughout its cervical segment. Left common carotid artery/ICA: The left common carotid artery is normal. The origin of the left external carotid artery is affected by atherosclerosis. There is no gross stenosis. The proximal left carotid bulb is patent. There is been an abrupt cut off. The distal left carotid bulb is occluded. The left internal carotid artery is occluded. Vertebral arteries: The right vertebral artery is small. This is difficult to follow due to artifact from incoming contrast. The proximal segment is not seen. Distally this is a very small vessel. The left vertebral artery is the dominant vessel. This is  normal throughout. CTA HEAD: Internal carotid arteries: The right internal carotid artery reconstitutes at the level skull base. This is due to collateral flow. There is scattered calcified atheromatosis. There is no stenosis of the cavernous segment. The ophthalmic artery origin is  patent. The left internal carotid artery is diminutive. This is a small vessel there is severe stenosis in its cavernous segment. The ophthalmic artery on the left is patent.  Middle cerebral arteries: On the right the MCA and its proximal branches are normal. On the left, the MCA is a small vessel. The M1 and M2 branches are patent. Anterior cerebral arteries: There is a normal right A1 segment of the anterior cerebral artery. A left A1 segment is not identified. Only a single A2 segment is identified. Vertebral arteries: The distal right vertebral artery is diminutive. There is a normal dominant left vertebral artery. Basilar artery: Normal. Superior cerebellar arteries: Normal. Posterior cerebral arteries: There is a normal large left posterior cerebral artery. There is a normal left posterior communicating artery. There is a small right posterior communicating artery. There is a aplastic right P1 segment. No aneurysms are identified. The superior sagittal sinus, vein of Paresh, internal cerebral veins, straight sinus, transverse sinuses and sigmoid sinuses are patent. Axial source data: There are no suspicious findings the lung apices. There is no upper mediastinal adenopathy. There is a small vague nodule in the right thyroid lobe measuring 1 cm. There is no cervical adenopathy. In the brain, there are old infarcts in both occipital lobes and the left frontal and parietal lobes. 1. Occluded cervical left internal carotid artery. There is an abrupt cut off. This may be an acute occlusion. This cavernous sinus segment is diminutive. 2. Occluded right internal carotid artery. There is a normal cavernous sinus segment. 3. Absent A1 and A2 segments of the left anterior cerebral artery. 4. Patent but small left middle cerebral artery and proximal branches. 5. Possible occlusion of the right subclavian artery at its origin. This is not well seen. The proximal right vertebral artery is not seen. This may also be occluded. These findings were telephoned to Pete Castro MD at 11:48 AM on 12/12/2022. James Vergara **This report has been created using voice recognition software.  It may contain minor errors which are inherent in voice recognition technology. ** Final report electronically signed by Dr. Katia Dorman on 12/12/2022 11:50 AM    XR CHEST PORTABLE    Result Date: 12/12/2022  PROCEDURE: XR CHEST PORTABLE CLINICAL INFORMATION: ams. COMPARISON: Chest x-ray dated 22nd of November 2022. TECHNIQUE: AP upright view of the chest. FINDINGS: There is borderline cardiomegaly. .The mediastinum is not widened. There is  atelectasis or scarring at the left lung base. The remaining lungs are clear. There are no effusions. . The pulmonary vascularity is normal. No suspicious osseous lesions are present. 1. Borderline cardiomegaly. 2. Atelectasis or scarring at left lung base. 3. Otherwise negative chest x-ray. . **This report has been created using voice recognition software. It may contain minor errors which are inherent in voice recognition technology. ** Final report electronically signed by DR Gayla Landau on 12/12/2022 12:37 PM       Assessment and Plan:        Left-sided weakness and sensory deficit s/p TNK administration 12/12/22 1108  Imaging  CTH negative for acute intracranial abnormalities. Chronic strokes of left frontal lobe, bilateral occipital lobes, left parietal lobe, and right basal ganglia, full read above. CTA of head and neck revealed bilateral internal carotid occlusions, absent A1 and A2 segments of L GILDARDO, possible occlusions of R subclavian and proximal R vertebral arteries, full read above. No need for carotid ultrasound. TNK administered 12/12/22 1108. MRI of brain without contrast ordered. If this can be performed around 24 hours post-tPA administration, no follow-up head CT is needed. 2D echocardiogram ordered. Stat CT head is needed if the patient develops new-onset altered mental status, a severe headache, or new-onset neurologic deficit. Thrombolytic recommendations  Admit to ICU  Bedrest for 24 hours. Thrombolytic precautions. No Lovenox, antiplatelets, or anticoagulation for the first 24 hours.  Please use SCDs for DVT prophylaxis. Blood pressure recommendations as below. Neurochecks and NIHSS per thrombolytic order set  Risk factors and medications  Systolic -119. Keep well hydrated. Initiate normal saline at 75 ml/hr as needed. Hold antiplatelets and anticoagulation for minimum of 24 hours. HgbA1C in the morning. If the patient has diabetes, recommend tight control of A1c with goal of <7.0 if attainable. Lipid panel in the morning. LDL goal of 45-70. Continue Lipitor. Smoking and alcohol cessation when applicable. Provide stroke eduction for individualized risk factors. DVT prophylaxis with SCDs (hold heparin/lovenox for 24 hours after IV tPA or IA intervention)  Other recommendations  Dysphagia screen prior to oral intake  PT/OT/SLP Consult  EKG/Telemetry to monitor for atrial fibrillation  NIHSS every shift. Neuro checks per unit unless otherwise specified. Head of bed 45 degree. Neurology following. Please call with any questions or concerns. This patient was seen and evaluated with Dr. Dilcia Rodriguez and he is in agreement with the assessment and plan.     Electronically signed by Josesito Watt PA-C on 12/12/22 at 9:00 PM EST

## 2022-12-12 NOTE — PROGRESS NOTES
Patient arrived to unit from ed via cart. Patient transferred to ICU bed and placed on continuous ICU bedside monitor. Patient admitted for Acute CVA (cerebrovascular accident) ([facility] [address] [I63.9]  Abnormal QT interval present on electrocardiogram [R94.31]  Cerebrovascular accident (CVA), unspecified mechanism ([facility] [address] [I63.9]. Vitals obtained. See flowsheets. Patient's IV access includes right forearm x 2 sites  . Current infusions and rates of infusion include int rfa . Assessment completed by Dima Barone Two nurse skin assessment completed by Ericka Rankin . See flowsheets for assessment details. Policies and procedures of ICU able to be explained to patient at this time. Family member(s)/representative(s) present at time of admission include brother Aamrilis Felder. Patient rights explained to family member(s)/representatives and patient, as able. Patient/patient's family member(s)/representative(s) N/A to have physician notified of their admission. All questions posed by patient's family member(s)/representative(s) and patient answered at this time.

## 2022-12-12 NOTE — ED NOTES
Pt bed changed at this time. Depend and external catheter placed on patient.      Tara Roa RN  12/12/22 [id number]

## 2022-12-12 NOTE — PLAN OF CARE
Problem: Discharge Planning  Goal: Discharge to home or other facility with appropriate resources  Outcome: Not Progressing  Flowsheets (Taken 12/12/2022 1819)  Discharge to home or other facility with appropriate resources:   Identify barriers to discharge with patient and caregiver   Identify discharge learning needs (meds, wound care, etc)   Refer to discharge planning if patient needs post-hospital services based on physician order or complex needs related to functional status, cognitive ability or social support system   Arrange for needed discharge resources and transportation as appropriate     Problem: Neurosensory - Adult  Goal: Achieves stable or improved neurological status  Outcome: Progressing  Flowsheets (Taken 12/12/2022 1819)  Achieves stable or improved neurological status:   Assess for and report changes in neurological status   Initiate measures to prevent increased intracranial pressure   Maintain blood pressure and fluid volume within ordered parameters to optimize cerebral perfusion and minimize risk of hemorrhage   Monitor temperature, glucose, and sodium.  Initiate appropriate interventions as ordered     Problem: Neurosensory - Adult  Goal: Achieves maximal functionality and self care  Outcome: Not Progressing  Flowsheets (Taken 12/12/2022 1819)  Achieves maximal functionality and self care:   Encourage and assist patient to increase activity and self care with guidance from physical therapy/occupational therapy   Monitor swallowing and airway patency with patient fatigue and changes in neurological status     Problem: Hematologic - Adult  Goal: Maintains hematologic stability  Outcome: Progressing  Flowsheets (Taken 12/12/2022 1819)  Maintains hematologic stability:   Assess for signs and symptoms of bleeding or hemorrhage   Monitor labs for bleeding or clotting disorders     Problem: Pain  Goal: Verbalizes/displays adequate comfort level or baseline comfort level  Outcome: Not Progressing  Flowsheets (Taken 12/12/2022 1819)  Verbalizes/displays adequate comfort level or baseline comfort level:   Administer analgesics based on type and severity of pain and evaluate response   Assess pain using appropriate pain scale   Implement non-pharmacological measures as appropriate and evaluate response   Notify Licensed Independent Practitioner if interventions unsuccessful or patient reports new pain   Consider cultural and social influences on pain and pain management   Encourage patient to monitor pain and request assistance     Problem: Safety - Adult  Goal: Free from fall injury  Outcome: Progressing  Flowsheets (Taken 12/12/2022 1819)  Free From Fall Injury:   Instruct family/caregiver on patient safety   Based on caregiver fall risk screen, instruct family/caregiver to ask for assistance with transferring infant if caregiver noted to have fall risk factors     Problem: Discharge Planning  Goal: Discharge to home or other facility with appropriate resources  Outcome: Not Progressing  Flowsheets (Taken 12/12/2022 1819)  Discharge to home or other facility with appropriate resources:   Identify barriers to discharge with patient and caregiver   Identify discharge learning needs (meds, wound care, etc)   Refer to discharge planning if patient needs post-hospital services based on physician order or complex needs related to functional status, cognitive ability or social support system   Arrange for needed discharge resources and transportation as appropriate     Problem: Neurosensory - Adult  Goal: Achieves maximal functionality and self care  Outcome: Not Progressing  Flowsheets (Taken 12/12/2022 1819)  Achieves maximal functionality and self care:   Encourage and assist patient to increase activity and self care with guidance from physical therapy/occupational therapy   Monitor swallowing and airway patency with patient fatigue and changes in neurological status     Problem: Pain  Goal: Verbalizes/displays adequate comfort level or baseline comfort level  Outcome: Not Progressing  Flowsheets (Taken 12/12/2022 1819)  Verbalizes/displays adequate comfort level or baseline comfort level:   Administer analgesics based on type and severity of pain and evaluate response   Assess pain using appropriate pain scale   Implement non-pharmacological measures as appropriate and evaluate response   Notify Licensed Independent Practitioner if interventions unsuccessful or patient reports new pain   Consider cultural and social influences on pain and pain management   Encourage patient to monitor pain and request assistance  Care plan reviewed with patient and family. Patient and family  verbalize understanding of the plan of care and contribute to goal setting.

## 2022-12-12 NOTE — ED NOTES
Pt placed on 2L o2 via nc at this time for spo2 of 87% on room air.      Nay Zaman RN  12/12/22 8441

## 2022-12-12 NOTE — NURSE NAVIGATOR
1054 - pt on CT table being assessed by ED staff and Neuro team.   1055 - pt actively vomiting  1057 - OK for TNK per Dr. Noelle Ewing  1108 - TNK given by Almita WASHINGTON  1110 - Pt returned to ED room 22 with ED staff, awaiting clean ICU bed.

## 2022-12-12 NOTE — H&P
CRITICAL CARE H&P    Patient:  Alex Stephens    Unit/Bed:4D-05/005-A  YOB: 1960  MRN: [id number]   PCP: Burton Eisenmenger, DO  Date of Admission: 12/12/2022  Chief Complaint:- Stroke alert s/p TNK    Assessment and Plan:    Stroke: Patient was stroke alert in ED with NIH 6 s/p TNK. LKW 12/12 @ 9:30AM. Prior to Thanksgiving patient was living independently with mild cognitive impairment due to prior strokes about 10-15 years ago. Recent history of hospital admission to [address] [place] [address] on 11/25-12/2 due to stroke-like symptoms with left arm weakness, left hemineglect, right gaze deviation, aphasia, and dysarthria. Image findings from [address] [place] [address] (noted below) were chronic findings and per their discharge summary no acute intervention were done and she was discharged to SNF for rehab. Brother reports newly worsening dysarthria that has progressed in the past few days. Neurology is following - appreciate their recommendations. CTH and CTA full report below. MRI WO pending. Permissive HTN (up to 180/105) for 24 hrs post TNK, then BP goal <130/80. Hold OAC and Antiplatelets for 24 hours. Continue Statin. Continue SCD's for DVT Prophylaxis. PT/OT/SLP Consult. Essential HTN:  On Atenolol 25mg QD, Amlodipine-Benazepril 10-20mg QD- held until SLP eval. Allowing permissive hypertension for 24 hours. NIDDM2:  HgbA1C 7.8% on 11/25/22. On Metformin 1000mg BID, Gabapentin 100mg TID - held for now. Start low dose SSI. Hypoglyceic protocols in place. HLD:  Lipid Panel on 11/25/22 from [facility] - Total Cholesterol 214, Trig 236, HDL 30,   Depression/Anxiety: On Buspar 15mg BID, Lexapro 10mg QDAtarax 25mg PRN - held until SLP eval  GERD: On Prevacid 30mg QD - held until SLP eval  Hx Mild Intermittent Asthma: On Flovent, Albuterol PRN  Obesity: BMI 34.76.  on weight loss, diet, exercise.     INITIAL H AND P AND ICU COURSE:  Patient is a 64 y/o  Female with PMH DM2, GERD, HTN, HLD, Depression/Anxiety, Asthma, and Hx CVA in 2010. Patient presented to [facility] ED as a stroke alert with LKW on 12/12 at 9:30 AM with NIH 5 with limb ataxia, hemianopia, and dysarthria. CTA was significant for findings of occluded left and right ICA. She received TNK in the ED and Neuro-Intervention was consulted who has no plan for endovascular intervention at this time. Patient was transferred to the ICU for monitoring pos-TNK. Patient's brother Rodríguez Dyer is at bedside who provides most of patient's history. He states that this all started before Thanksving. Although she had a history of stroke, she had minimal deficits except some cognitive impairment and living independently prior to Thanksgiving. He states that she went to [facility] ED on 11/22 with complaints of dizziness. She had a CT Head at that time with no acute findings and thus discharged home. She then went back to the ED at [facility] on 11/24/22 and was transferred to [address] [place] [address]. At that time she complained of headache, fluctuating left arm weakness, slurred speech, and was not able to see anything in her left visual field. On exam at that time she was noted to have left arm weakness, left hemineglect, right gaze deviation, aphasia, and dysarthria. She did not receive tPA at that time as she was out of the window with NIH 6. She underwent imaging and cerebral angiogram that showed complete occlusion of right ICA, left ICA and right PCA. There was noted to be some taking no in proximal segment of of ICA but most of the branches including GILDARDO/MCA was filled by ECA branches. Due to chronic occlusion, the decision was made not to put stent at that time as it will have more risks than benefits. She was thus discharged to SNF with ASA, Plavix, and Lipitor 80mg QD. Brother reports that the newest symptoms he has noticed is her dysarthria that has progressively been getting worse since a few days ago. Past Medical History: Per HPI.   Family History: Mother (): Diabetes, Glaucoma; Father (): Heart attack; Sister 1 (): Diabetes; Sister 2 (): Breast Cancer; Sister 3: Colon polyps; Brother: Diabetes, colon polyps. Social History:  Smokes 1 pack per week. ROS   Headache, dysarthria, left hemineglect    Scheduled Meds:   atorvastatin  80 mg Oral Nightly     Continuous Infusions:   niCARdipine       PHYSICAL EXAMINATION:  T:  98.3F  P:  71. RR:  17. B/P:  180/97. O2 Sat:  96% on 2L NC. I/O:  TBD  Body mass index is 31.6 kg/m². GCS:   General:  Acutely ill-appearing female in no acute distress  HEENT:  normocephalic and atraumatic. No scleral icterus. PERR  Neck: supple. No Thyromegaly. Lungs: clear to auscultation. No retractions  Cardiac: RRR. No JVD. Abdomen: soft. Nontender. Extremities:  No clubbing, cyanosis, or edema x 4. Vasculature: capillary refill < 3 seconds. Palpable dorsalis pedis pulses. Skin:  warm and dry. Psych:  Alert and oriented x3. Affect appropriate  Lymph:  No supraclavicular adenopathy. Neuro: Left hemianopia and left hemineglect  Strength  5/5 and equal in proximal and distal right upper and lower extremity, 0/5 to left upper arm, 3/5 to left lower leg. Coordination and Gait  Finger to nose abnormal on left, normal on right  Sensation  Intact light sensation in LE/UE  Dysarthria: slowed or slurred speech. Data: (All radiographs, tracings, PFTs, and imaging are personally viewed and interpreted unless otherwise noted). Sodium  WBC  Telemetry shows  CT Head WO -  ([facility]) - No evidence of an acute intracranial process.  Age and chronic small vessel or microvascular ischemic change of is cephalization of the bilateral cerebral hemispheres likely related to prior infarcts  CT Angiogram Carotid -  ([facility]) - Occlusion of bilateral internal carotid arteries just distal to the bifurcation with reconstitution of the distal internal carotid arteries at the levels of the skull base more pronounced on the right; Dominant left vertebral artery is patent throughout its cervical course; multifocal intermittent segmental occlusion of the diminutive right vertebral artery  CT Angiogram Head - 11/24 ([facility]) - Chronic changes associated with bilateral prior cerebral hemispheric infarcts and occlusion of the bilateral extracranial internal carotid arteries; no definitive acute LVO or intracranial aneurysm identified within the limitations of the CT angiographic examination  MRI Brain WO - 11/25 ([facility]) -  Large area of acute/subacute ischemia in the right occipital lobe with additional areas of acute ischemia in the right posterior frontal and parietal cortex; Multiple areas of encephalomalacia related to old infarcts in the left cerebral hemisphere  TTE - 11/28 ([facility]) - Mild concentric increased wall thickness/hypertrophy of LV; EF 60-65%; no evidence of right to left interatrial shunting; mild mitral stenosis  CT Head WO - 12/12 - No acute findings; Stable appearing old infarcts in the left frontal lobe, both occipital lobes and a portion of the left parietal lobe; Small old infarct in the medial aspect of the right basal ganglia  CTA Head + Neck - 12/12 - Occluded cervical left internal carotid artery, there is abrupt cut off; occluded right ICA; Absent A1 and A2 segments of left anterior cerebral artery; patent but small left middle cerebral artery and proximal branches; possible occlusion of right subclavian artery at its origin (this is not well seen); proximal right vertebral artery is not seen (this may also be occluded)    Seen with multidisciplinary ICU team.  Meets Continued ICU Level Care Criteria:    [x] Yes   [] No - Transfer Planned to listed location:  [] HOSPITALIST CONTACTED-      Case and plan discussed with Dr. Jyoti Maldonado. Electronically signed by Barbara Loera DO  [address]  Patient seen by me including key components of medical care.   Case discussed with resident physician. Acute on chronic disease. Post thrombolysis. Italicized font, if present,  represents changes to the note made by me. CC time 35 minutes. Time was discontiguous. Time does not include procedure. Time does include my direct assessment of the patient and coordination of care. Time represents more than 50% of the time involved with patient care by the [address] [place] [address] team.  Electronically signed by Romelia Rivera.  Adonay Tripp MD.

## 2022-12-12 NOTE — PROGRESS NOTES
Code Stroke Note    Last Known Well < 4.5 hours? Yes    Recent anticoagulant use (therapeutic Lovenox within 24 hrs, DOAC/DTI within 48 hrs if normal renal function, warfarin with INR > 1.7, PT > 15 sec, aPTT > 40 sec)? No    BP less than 185/110 mmHg (if no, include what medication was used to decrease BP)? Yes    Are there any other contraindications to TNK (previous intracranial hemorrhage, recent or active bleeding, intracranial or intraspinal surgery within 3 months, previous stroke within 3 months, intracranial neoplasm, GI malignancy, GI bleeding in previous 21 days, infective endocarditis, platelets < 076,289, stroke suspected to be due to aortic arch dissection)? No    Was TNK given (over 5 seconds & flushed before & after)? Yes    If yes, were orders placed in EPIC? yes    If no, include reason for no TNK: N/A    If no and TNK was mixed, was the product returned to pharmacy?  N/A    Dismissed from code stroke by: Provider Dr. Farheen Patricia, Dr. Alejandro Vickers PharmD, BCPS 12/12/2022 11:33 AM

## 2022-12-12 NOTE — ED PROVIDER NOTES
CODE STROKE   Activation By:  EMS Pre-Alert  Arrived By: EMS     NIHSS, POC glucose, rapid physical and neurologic exam, vitals including weight obtained in the ED before transfer to CT. The CODE Stroke Team met the patient on arrival to CT. Last Known Well: 0930  Initial NIHSS: 5 (from Screenings)  NIH Stroke Scale  Interval: Reassessment  Level of Consciousness (1a): Alert  LOC Questions (1b): Answers both correctly  LOC Commands (1c): Performs both tasks correctly  Best Gaze (2): Normal  Visual (3): (!) Partial hemianopia  Facial Palsy (4): Normal symmetrical movement  Motor Arm, Left (5a): Some effort against gravity  Motor Arm, Right (5b): No drift  Motor Leg, Left (6a): No drift  Motor Leg, Right (6b): No drift  Limb Ataxia (7): (!) Present in one limb  Sensory (8): (!) Mild to Moderate  Best Language (9): No aphasia  Dysarthria (10): Mild to moderate, slurs some words  Extinction and Inattention (11): No abnormality  Total: 6    POC Glucose: 163 mg/dl in EMS    CT Head Without Contrast:    IMPRESSION:     1. No acute findings. 2. Stable appearing old infarcts in the left frontal lobe, both occipital lobes and a portion of the left parietal lobe. 3. Small old infarct in the medial aspect of the right basal ganglia. ECG, IV inserted, and blood obtained between CT non-contrast and CTA. ECG: NST with ST depression in V4, V5, and V6 non- specific     CTA Head and Neck:   IMPRESSION:     1. Occluded cervical left internal carotid artery. There is an abrupt cut off. This may be an acute occlusion. This cavernous sinus segment is diminutive. 2. Occluded right internal carotid artery. There is a normal cavernous sinus segment. 3. Absent A1 and A2 segments of the left anterior cerebral artery. 4. Patent but small left middle cerebral artery and proximal branches. 5. Possible occlusion of the right subclavian artery at its origin. This is not well seen.  The proximal right vertebral artery is not seen. This may also be occluded. Thrombolysis:   Patient presents with signs and symptoms of acute ischemic stroke. In accordance with national stroke guidelines and in and in consultation with Neurointerventional Stroke Team and Dr. Michelle Phipps, the patient is eligible to receive IV-TNK/TPA based on severity of symptoms, time of onset, imaging results and risk/benefit analysis. Risk vs benefit discussion was held with the patient, including specifically the risk of intracranial hemorrhage of 5-6% and weighed against conservative treatment and potential for life-long disability or death. The patient made the decision to proceed with IV-TNK/TPA      Endovascular Intervention:  at this time no plan for endovascular intervention per Dr. Nurys Sow:  Pt is a 66 yo female with PMHX DMII, CVA, Depression with complaint of left sided weakness and slurred speech. Pt symptoms started at 0930 per EMS report. Called to confirm with nursing home she had been at baseline just prior to this time. Per report she did have hx of left sided weakness but mild and mild slurred speech at baseline but much worse when symptoms started. Pt differential dx includes but is not limited to CVA- ischemic vs hemorrhagic, [place] Palsy, AAA, hypoglycemia, electrolyte abnormality, musc/ske weakness, and seizure. Plan:   Labs  EKG  Cardiac Monitor  Consult Neuro team - Dr. Aziza Curiel  POC glucose  CT head without contrast  CTA head and neck with and without      Pt initial NIH is 5. Dr. Jackie Bang , neurointerventionist, to bedside recommending TNK at this time. Discussed risk and benefits with patient including intracranial hemorrhage, worsening of condition and death. Consent given per patient and administered. Pt will be admitted to ICU when bed is available.           References:  2019 AHA/ASA Guideline on Management of Acute Ischemic Stroke Update  2018 AHA/ASA Guidelines for Management of Acute Ischemic Stroke  2018 [facility] Clinical Policy: Use of Acute TPA for the Management of Acute Ischemic Stroke in the Emergency Department     MIPS Measure #187:   Stroke: Thrombolytic Therapy  Gladys ] The patient arrived at the hospital within 3.5 hours of time last known well, was diagnosed with subacute or acute ischemic stroke. IV TNK was initiated within 4.5 hours of time last known well. [SATISFIES MIPS ]    [] The patient was diagnosed with subacute or acute ischemic stroke. IV TNK was not initiated within 4.5 hours of last known well due to [select]: [MIPS Arty Upton  [] Patient arrived more than 3.5 hours after last known well time, or the time last known well is unknown   [] Patient has a medical contra-indication or reason for not administering  (ex. neurologist does not believe TNK is appropriate, active internal bleeding, serious head trauma, acute current or history of intracranial hemorrhage, uncontrollable hypertension, seizure at onset of stroke, CVA in last 3 months, Intracranial or intraspinal surgery in last 3 months, bleeding disorder, thrombocytopenia < 100,000, early radiographic ischemic changes on head CT, INR > 1.7, intracranial neoplasm, AVM, or aneurysm, patient in stroke trial, patient admitted for elective carotid intervention)  []REFUSAL: Patient or family declined IV TNK  [ ] The patient, who arrived at the hospital within 3.5 hours of time last known well, was diagnosed with subacute or acute ischemic stroke. IV TNK was not initiated within 4.5 hours of time last known well.  Yulisa Mari MD  Resident  12/12/22 [address], [place]  Resident  12/12/22 [id number]

## 2022-12-12 NOTE — PROGRESS NOTES
[facility] THERAPY MISSED TREATMENT NOTE  STRZ ICU 4D      Date: 2022  Patient Name: David Johnson        MRN: [id number]    : 1960  (65 y.o.)    REASON FOR MISSED TREATMENT:  ST received novel orders from Brooke Barragan PA-C to complete clinical swallow evaluation and pwzsta-[id number]-yektzcvoy evaluation s/t code stroke. Patient unavailable at this time given recent arrival to ICU. ST to re-attempt at a later date/time as patient is medically appropriate and available.      [facility]) [address]

## 2022-12-13 ENCOUNTER — APPOINTMENT (OUTPATIENT)
Dept: MRI IMAGING | Age: 62
End: 2022-12-13
Payer: MEDICARE

## 2022-12-13 ENCOUNTER — APPOINTMENT (OUTPATIENT)
Dept: GENERAL RADIOLOGY | Age: 62
End: 2022-12-13
Payer: MEDICARE

## 2022-12-13 ENCOUNTER — APPOINTMENT (OUTPATIENT)
Dept: CT IMAGING | Age: 62
End: 2022-12-13
Payer: MEDICARE

## 2022-12-13 LAB
ALLEN TEST: POSITIVE
ANION GAP SERPL CALCULATED.3IONS-SCNC: 10 MEQ/L (ref 8–16)
AVERAGE GLUCOSE: 159 MG/DL (ref 70–126)
BACTERIA: ABNORMAL /HPF
BASE EXCESS (CALCULATED): 5.6 MMOL/L (ref -2.5–2.5)
BILIRUBIN URINE: NEGATIVE
BLOOD, URINE: ABNORMAL
BUN BLDV-MCNC: 8 MG/DL (ref 7–22)
CALCIUM SERPL-MCNC: 9.4 MG/DL (ref 8.5–10.5)
CASTS 2: ABNORMAL /LPF
CASTS UA: ABNORMAL /LPF
CHARACTER, URINE: ABNORMAL
CHLORIDE BLD-SCNC: 100 MEQ/L (ref 98–111)
CHOLESTEROL, TOTAL: 136 MG/DL (ref 100–199)
CO2: 32 MEQ/L (ref 23–33)
COLLECTED BY:: ABNORMAL
COLOR: YELLOW
CREAT SERPL-MCNC: 0.6 MG/DL (ref 0.4–1.2)
CRYSTALS, UA: ABNORMAL
DEVICE: ABNORMAL
EKG ATRIAL RATE: 87 BPM
EKG P AXIS: 90 DEGREES
EKG P-R INTERVAL: 182 MS
EKG Q-T INTERVAL: 394 MS
EKG QRS DURATION: 80 MS
EKG QTC CALCULATION (BAZETT): 474 MS
EKG R AXIS: 29 DEGREES
EKG T AXIS: 96 DEGREES
EKG VENTRICULAR RATE: 87 BPM
EPITHELIAL CELLS, UA: ABNORMAL /HPF
ERYTHROCYTE [DISTWIDTH] IN BLOOD BY AUTOMATED COUNT: 14.7 % (ref 11.5–14.5)
ERYTHROCYTE [DISTWIDTH] IN BLOOD BY AUTOMATED COUNT: 50.2 FL (ref 35–45)
GFR SERPL CREATININE-BSD FRML MDRD: > 60 ML/MIN/1.73M2
GLUCOSE BLD-MCNC: 115 MG/DL (ref 70–108)
GLUCOSE BLD-MCNC: 122 MG/DL (ref 70–108)
GLUCOSE BLD-MCNC: 130 MG/DL (ref 70–108)
GLUCOSE BLD-MCNC: 138 MG/DL (ref 70–108)
GLUCOSE BLD-MCNC: 148 MG/DL (ref 70–108)
GLUCOSE BLD-MCNC: 151 MG/DL (ref 70–108)
GLUCOSE URINE: NEGATIVE MG/DL
HBA1C MFR BLD: 7.3 % (ref 4.4–6.4)
HCO3: 33 MMOL/L (ref 23–28)
HCT VFR BLD CALC: 38.5 % (ref 37–47)
HDLC SERPL-MCNC: 35 MG/DL
HEMOGLOBIN: 12.3 GM/DL (ref 12–16)
IFIO2: 2
KETONES, URINE: NEGATIVE
LDL CHOLESTEROL CALCULATED: 71 MG/DL
LEUKOCYTE ESTERASE, URINE: ABNORMAL
MCH RBC QN AUTO: 29.9 PG (ref 26–33)
MCHC RBC AUTO-ENTMCNC: 31.9 GM/DL (ref 32.2–35.5)
MCV RBC AUTO: 93.4 FL (ref 81–99)
MISCELLANEOUS 2: ABNORMAL
NITRITE, URINE: POSITIVE
O2 SATURATION: 70 %
P2Y12 ASSAY: 212 PRU (ref 180–376)
PCO2: 59 MMHG (ref 35–45)
PH BLOOD GAS: 7.36 (ref 7.35–7.45)
PH UA: 6.5 (ref 5–9)
PLATELET # BLD: 240 THOU/MM3 (ref 130–400)
PMV BLD AUTO: 10.6 FL (ref 9.4–12.4)
PO2: 39 MMHG (ref 71–104)
POTASSIUM REFLEX MAGNESIUM: 3.6 MEQ/L (ref 3.5–5.2)
PROTEIN UA: ABNORMAL
RBC # BLD: 4.12 MILL/MM3 (ref 4.2–5.4)
RBC URINE: ABNORMAL /HPF
RENAL EPITHELIAL, UA: ABNORMAL
SODIUM BLD-SCNC: 142 MEQ/L (ref 135–145)
SOURCE, BLOOD GAS: ABNORMAL
SPECIFIC GRAVITY, URINE: 1.03 (ref 1–1.03)
TRIGL SERPL-MCNC: 149 MG/DL (ref 0–199)
UROBILINOGEN, URINE: 1 EU/DL (ref 0–1)
WBC # BLD: 9 THOU/MM3 (ref 4.8–10.8)
WBC UA: ABNORMAL /HPF
YEAST: ABNORMAL

## 2022-12-13 PROCEDURE — 2060000000 HC ICU INTERMEDIATE R&B

## 2022-12-13 PROCEDURE — 2580000003 HC RX 258: Performed by: STUDENT IN AN ORGANIZED HEALTH CARE EDUCATION/TRAINING PROGRAM

## 2022-12-13 PROCEDURE — 92523 SPEECH SOUND LANG COMPREHEN: CPT

## 2022-12-13 PROCEDURE — 70551 MRI BRAIN STEM W/O DYE: CPT

## 2022-12-13 PROCEDURE — 70450 CT HEAD/BRAIN W/O DYE: CPT

## 2022-12-13 PROCEDURE — 36600 WITHDRAWAL OF ARTERIAL BLOOD: CPT

## 2022-12-13 PROCEDURE — 81001 URINALYSIS AUTO W/SCOPE: CPT

## 2022-12-13 PROCEDURE — 6370000000 HC RX 637 (ALT 250 FOR IP): Performed by: STUDENT IN AN ORGANIZED HEALTH CARE EDUCATION/TRAINING PROGRAM

## 2022-12-13 PROCEDURE — 6370000000 HC RX 637 (ALT 250 FOR IP)

## 2022-12-13 PROCEDURE — 82948 REAGENT STRIP/BLOOD GLUCOSE: CPT

## 2022-12-13 PROCEDURE — 83036 HEMOGLOBIN GLYCOSYLATED A1C: CPT

## 2022-12-13 PROCEDURE — 87077 CULTURE AEROBIC IDENTIFY: CPT

## 2022-12-13 PROCEDURE — 85576 BLOOD PLATELET AGGREGATION: CPT

## 2022-12-13 PROCEDURE — 2500000003 HC RX 250 WO HCPCS: Performed by: STUDENT IN AN ORGANIZED HEALTH CARE EDUCATION/TRAINING PROGRAM

## 2022-12-13 PROCEDURE — 87086 URINE CULTURE/COLONY COUNT: CPT

## 2022-12-13 PROCEDURE — 73030 X-RAY EXAM OF SHOULDER: CPT

## 2022-12-13 PROCEDURE — 97163 PT EVAL HIGH COMPLEX 45 MIN: CPT

## 2022-12-13 PROCEDURE — 80048 BASIC METABOLIC PNL TOTAL CA: CPT

## 2022-12-13 PROCEDURE — 85027 COMPLETE CBC AUTOMATED: CPT

## 2022-12-13 PROCEDURE — 87186 SC STD MICRODIL/AGAR DIL: CPT

## 2022-12-13 PROCEDURE — 93010 ELECTROCARDIOGRAM REPORT: CPT | Performed by: INTERNAL MEDICINE

## 2022-12-13 PROCEDURE — 82803 BLOOD GASES ANY COMBINATION: CPT

## 2022-12-13 PROCEDURE — 97167 OT EVAL HIGH COMPLEX 60 MIN: CPT

## 2022-12-13 PROCEDURE — 97530 THERAPEUTIC ACTIVITIES: CPT

## 2022-12-13 PROCEDURE — 99291 CRITICAL CARE FIRST HOUR: CPT | Performed by: INTERNAL MEDICINE

## 2022-12-13 PROCEDURE — 92610 EVALUATE SWALLOWING FUNCTION: CPT

## 2022-12-13 PROCEDURE — 6360000002 HC RX W HCPCS: Performed by: STUDENT IN AN ORGANIZED HEALTH CARE EDUCATION/TRAINING PROGRAM

## 2022-12-13 PROCEDURE — 97110 THERAPEUTIC EXERCISES: CPT

## 2022-12-13 PROCEDURE — 99232 SBSQ HOSP IP/OBS MODERATE 35: CPT | Performed by: SOCIAL WORKER

## 2022-12-13 PROCEDURE — 6370000000 HC RX 637 (ALT 250 FOR IP): Performed by: PHYSICIAN ASSISTANT

## 2022-12-13 PROCEDURE — 80061 LIPID PANEL: CPT

## 2022-12-13 PROCEDURE — 36415 COLL VENOUS BLD VENIPUNCTURE: CPT

## 2022-12-13 PROCEDURE — 97112 NEUROMUSCULAR REEDUCATION: CPT

## 2022-12-13 PROCEDURE — 93306 TTE W/DOPPLER COMPLETE: CPT

## 2022-12-13 PROCEDURE — 99223 1ST HOSP IP/OBS HIGH 75: CPT | Performed by: NURSE PRACTITIONER

## 2022-12-13 RX ORDER — LISINOPRIL 20 MG/1
20 TABLET ORAL DAILY
Status: DISCONTINUED | OUTPATIENT
Start: 2022-12-13 | End: 2022-12-14

## 2022-12-13 RX ORDER — ATENOLOL 25 MG/1
25 TABLET ORAL DAILY
Status: DISCONTINUED | OUTPATIENT
Start: 2022-12-13 | End: 2022-12-14

## 2022-12-13 RX ORDER — AMLODIPINE BESYLATE AND BENAZEPRIL HYDROCHLORIDE 10; 20 MG/1; MG/1
1 CAPSULE ORAL DAILY
Status: DISCONTINUED | OUTPATIENT
Start: 2022-12-13 | End: 2022-12-13

## 2022-12-13 RX ORDER — AMLODIPINE BESYLATE 10 MG/1
10 TABLET ORAL DAILY
Status: DISCONTINUED | OUTPATIENT
Start: 2022-12-13 | End: 2022-12-17 | Stop reason: HOSPADM

## 2022-12-13 RX ORDER — LANOLIN ALCOHOL/MO/W.PET/CERES
3 CREAM (GRAM) TOPICAL NIGHTLY PRN
Status: DISCONTINUED | OUTPATIENT
Start: 2022-12-13 | End: 2022-12-17 | Stop reason: HOSPADM

## 2022-12-13 RX ADMIN — SODIUM CHLORIDE, PRESERVATIVE FREE 10 ML: 5 INJECTION INTRAVENOUS at 08:57

## 2022-12-13 RX ADMIN — SODIUM CHLORIDE 5 MG/HR: 9 INJECTION, SOLUTION INTRAVENOUS at 18:47

## 2022-12-13 RX ADMIN — ATORVASTATIN CALCIUM 80 MG: 80 TABLET, FILM COATED ORAL at 21:20

## 2022-12-13 RX ADMIN — LISINOPRIL 20 MG: 20 TABLET ORAL at 12:03

## 2022-12-13 RX ADMIN — SODIUM CHLORIDE, PRESERVATIVE FREE 10 ML: 5 INJECTION INTRAVENOUS at 21:21

## 2022-12-13 RX ADMIN — ACETAMINOPHEN 650 MG: 325 TABLET ORAL at 21:20

## 2022-12-13 RX ADMIN — CEFTRIAXONE SODIUM 1000 MG: 1 INJECTION, POWDER, FOR SOLUTION INTRAMUSCULAR; INTRAVENOUS at 08:21

## 2022-12-13 RX ADMIN — SODIUM CHLORIDE 7.5 MG/HR: 9 INJECTION, SOLUTION INTRAVENOUS at 13:57

## 2022-12-13 RX ADMIN — ATENOLOL 25 MG: 25 TABLET ORAL at 14:00

## 2022-12-13 RX ADMIN — AMLODIPINE BESYLATE 10 MG: 10 TABLET ORAL at 12:33

## 2022-12-13 RX ADMIN — ACETAMINOPHEN 650 MG: 650 SUPPOSITORY RECTAL at 11:52

## 2022-12-13 RX ADMIN — Medication 3 MG: at 21:20

## 2022-12-13 ASSESSMENT — ENCOUNTER SYMPTOMS
COUGH: 1
VOMITING: 1
SHORTNESS OF BREATH: 0
SORE THROAT: 0
RHINORRHEA: 0
ABDOMINAL PAIN: 0
NAUSEA: 1
PHOTOPHOBIA: 0

## 2022-12-13 ASSESSMENT — PAIN DESCRIPTION - DESCRIPTORS
DESCRIPTORS: ACHING
DESCRIPTORS: ACHING

## 2022-12-13 ASSESSMENT — PAIN SCALES - GENERAL
PAINLEVEL_OUTOF10: 0
PAINLEVEL_OUTOF10: 4
PAINLEVEL_OUTOF10: 3
PAINLEVEL_OUTOF10: 0
PAINLEVEL_OUTOF10: 3

## 2022-12-13 ASSESSMENT — PAIN DESCRIPTION - LOCATION
LOCATION: HEAD
LOCATION: HEAD

## 2022-12-13 NOTE — PROGRESS NOTES
CRITICAL CARE PROGRESS NOTE    Patient:  Harmeet Hughes    Unit/Bed:4D-05/005-A  YOB: 1960  MRN: [id number]   PCP: Brendon Stockton DO  Date of Admission: 12/12/2022  Chief Complaint:- Stroke alert s/p TNK    Assessment and Plan:    Stroke: Patient was stroke alert in ED with NIH 6 s/p TNK (@1108 on 12/12). LKW 12/12 @ 9:30AM. Prior to Thanksgiving patient was living independently with mild cognitive impairment due to prior strokes about 10-15 years ago. Recent history of hospital admission to [facility] on 11/25-12/2 due to stroke-like symptoms with left arm weakness, left hemineglect, right gaze deviation, aphasia, and dysarthria. Image findings from [facility] (noted below) were chronic findings and per their discharge summary no acute intervention were done and she was discharged to SNF for rehab. Brother reports newly worsening dysarthria that has progressed in the past few days. Neurology is following - appreciate their recommendations. CTH and CTA full report below. Repeat CTH overnight on 12/12 due to worsening NIH reports developing subacute right parietotemporal infarct. MRI WO pending. Permissive HTN (up to 180/105) for 24 hrs post TNK, then BP goal <130/80. Hold OAC and Antiplatelets for 24 hours. Continue Statin. Continue SCD's for DVT Prophylaxis. PT/OT/SLP Consult. Acute Hypoxic Respiratory Failure: On RA at baseline. SpO2 was 87% on RA on admission. No evidence of pneumonia. ? aspiration due to dysarthria vs. Poor inspiratory effort. CXR on 12/12 shows no acute findings and patient denies shortness of breath nor cough. She remains afebrile and without leukocytosis. Encourage incentive spirometry. Continue to wean as tolerated to maintain SpO2 >90%. UTI: U/A with Nitrite +, Many bacteria. Start Rocephin 1g x5 days. Essential HTN:  On Atenolol 25mg QD, Amlodipine-Benazepril 10-20mg QD- held until SLP eval. Allowing permissive hypertension for 24 hours.   NIDDM2:  HgbA1C 7.8% on 11/25/22. On Metformin 1000mg BID, Gabapentin 100mg TID - held for now. Start low dose SSI. Hypoglyceic protocols in place. HLD:  Lipid Panel on 11/25/22 from [facility] - Total Cholesterol 214, Trig 236, HDL 30,   Depression/Anxiety: On Buspar 15mg BID, Lexapro 10mg QDAtarax 25mg PRN - held until SLP eval  GERD: On Prevacid 30mg QD - held until SLP eval  Hx Mild Intermittent Asthma: On Flovent, Albuterol PRN  Obesity: BMI 34.76.  on weight loss, diet, exercise. INITIAL H AND P AND ICU COURSE:  Patient is a 66 y/o  Female with PMH DM2, GERD, HTN, HLD, Depression/Anxiety, Asthma, and Hx CVA in 2010. Patient presented to [facility] ED as a stroke alert with LKW on 12/12 at 9:30 AM with NIH 5 with limb ataxia, hemianopia, and dysarthria. CTA was significant for findings of occluded left and right ICA. She received TNK in the ED and Neuro-Intervention was consulted who has no plan for endovascular intervention at this time. Patient was transferred to the ICU for monitoring pos-TNK. Patient's brother James Block is at bedside who provides most of patient's history. He states that this all started before Thanksgiving. Although she had a history of stroke, she had minimal deficits except some cognitive impairment and living independently prior to Thanksgiving. He states that she went to [facility] ED on 11/22 with complaints of dizziness. She had a CT Head at that time with no acute findings and thus discharged home. She then went back to the ED at [facility] on 11/24/22 and was transferred to [address] [place] [address] in [place]. At that time she complained of headache, fluctuating left arm weakness, slurred speech, and was not able to see anything in her left visual field. On exam at that time she was noted to have left arm weakness, left hemineglect, right gaze deviation, aphasia, and dysarthria. She did not receive tPA at that time as she was out of the window with NIH 6.  She underwent imaging and cerebral angiogram that showed complete occlusion of right ICA, left ICA and right PCA. There was noted to be some taking no in proximal segment of of ICA but most of the branches including GILDARDO/MCA was filled by ECA branches. Due to chronic occlusion, the decision was made not to put stent at that time as it will have more risks than benefits. She was thus discharged to SNF with ASA, Plavix, and Lipitor 80mg QD. Brother reports that the newest symptoms he has noticed is her dysarthria that has progressively been getting worse since a few days ago. Past 24 Hr Progress  Overnight patient had worsening NIH thus a repeat CTH was obtained with findings of Developing subacute right parietotemporal infarct. Patient's symptoms are otherwise remaining the same with left hemineglect, mild dysarthria, hemianopia. She denies any pain nor discomfort. Past Medical History: Per HPI. Family History: Mother (): Diabetes, Glaucoma; Father (): Heart attack; Sister 1 (): Diabetes; Sister 2 (): Breast Cancer; Sister 3: Colon polyps; Brother: Diabetes, colon polyps. Social History:  Smokes 1 pack per week. ROS   Dysarthria, left hemineglect, left arm weakness    Scheduled Meds:   atorvastatin  80 mg Oral Nightly    insulin lispro  0-4 Units SubCUTAneous TID WC    insulin lispro  0-4 Units SubCUTAneous Nightly    sodium chloride flush  5-40 mL IntraVENous 2 times per day     Continuous Infusions:   niCARdipine Stopped ([id number])    dextrose      sodium chloride       PHYSICAL EXAMINATION:  T:  98.9F.  P:  65. RR:  18. B/P:  117/64. O2 Sat: 97% on 2L NC. I/O: 591/0  Body mass index is 34.73 kg/m². GCS: 15  General: Patient is resting with eyes closed, awakes to voice and follows commands, not in acute distress  HEENT:  Normocephalic and atraumatic. No scleral icterus. PERR  Neck: Supple. No Thyromegaly. Lungs: clear to auscultation. No retractions  Cardiac: RRR. No JVD. Abdomen: soft. Nontender. Extremities:  No clubbing, cyanosis, or edema x 4. Vasculature: capillary refill < 3 seconds. Palpable dorsalis pedis pulses. Skin:  warm and dry. Psych:  Alert and oriented x3. Affect appropriate  Lymph:  No supraclavicular adenopathy. Neurologic: No seizures. Dysarthric, left hemineglect, left hand  0/5    Data: (All radiographs, tracings, PFTs, and imaging are personally viewed and interpreted unless otherwise noted). Sodium 142, K 3.6, Cl 100, CO2 32, BUN 8, Cr 0.6, AG 10  WBC 9, Hgb 12.3, Hct 38.5, MCV 93.4, Plt 240  Telemetry shows normal sinus rhythm  12/12 U/A Nitrite+, Many bacteria  CT Head WO - 11/24 ([facility]) - No evidence of an acute intracranial process.  Age and chronic small vessel or microvascular ischemic change of is cephalization of the bilateral cerebral hemispheres likely related to prior infarcts  CT Angiogram Carotid - 11/24 ([facility]) - Occlusion of bilateral internal carotid arteries just distal to the bifurcation with reconstitution of the distal internal carotid arteries at the levels of the skull base more pronounced on the right; Dominant left vertebral artery is patent throughout its cervical course; multifocal intermittent segmental occlusion of the diminutive right vertebral artery  CT Angiogram Head - 11/24 ([facility]) - Chronic changes associated with bilateral prior cerebral hemispheric infarcts and occlusion of the bilateral extracranial internal carotid arteries; no definitive acute LVO or intracranial aneurysm identified within the limitations of the CT angiographic examination  MRI Brain WO - 11/25 ([facility]) -  Large area of acute/subacute ischemia in the right occipital lobe with additional areas of acute ischemia in the right posterior frontal and parietal cortex; Multiple areas of encephalomalacia related to old infarcts in the left cerebral hemisphere  TTE - 11/28 ([facility]) - Mild concentric increased wall thickness/hypertrophy of LV; EF 60-65%; no evidence of right to left interatrial shunting; mild mitral stenosis  CT Head WO - 12/12 - No acute findings; Stable appearing old infarcts in the left frontal lobe, both occipital lobes and a portion of the left parietal lobe; Small old infarct in the medial aspect of the right basal ganglia  CTA Head + Neck - 12/12 - Occluded cervical left internal carotid artery, there is abrupt cut off; occluded right ICA; Absent A1 and A2 segments of left anterior cerebral artery; patent but small left middle cerebral artery and proximal branches; possible occlusion of right subclavian artery at its origin (this is not well seen); proximal right vertebral artery is not seen (this may also be occluded)  CT Head WO - 12/13 - Developing subacute right parietotemporal infarct    Seen with multidisciplinary ICU team.  Meets Continued ICU Level Care Criteria:    [x] Yes   [] No - Transfer Planned to listed location:  [] HOSPITALIST CONTACTED-      Case and plan discussed with Dr. Tsering Gibbons. Electronically signed by DO Jasmyn Marrero  Patient seen by me including key components of medical care. Case discussed with resident physician. Acute CVA involving right frontoparietal lobs extending to temporal lobe and occipital lobe. Large infarct. Post thrombolysis. Residual deficits persist including left hemiparesis and neglect. Italicized font, if present,  represents changes to the note made by me. CC time 35 minutes. Time was discontiguous. Time does not include procedure. Time does include my direct assessment of the patient and coordination of care. Time represents more than 50% of the time involved with patient care by the [address] [place] [address] team.  Electronically signed by Courtney Solis.  Tsering Gibbons MD.

## 2022-12-13 NOTE — SIGNIFICANT EVENT
Patient underwent an MRI which confirmed Moderate-sized acute infarct involving portions of the right frontoparietal junction, the posterior right frontal lobe, the posterior right temporal lobe and the anterior inferior right occipital lobe. Neurology is aware and is starting on Aspirin and Plavix. Neurology is ok for patient to be transferred to stepdown. Sign out provided to Dr. Marques Perez.

## 2022-12-13 NOTE — PLAN OF CARE
Problem: Discharge Planning  Goal: Discharge to home or other facility with appropriate resources  12/13/2022 1527 by Jarad Gold RN  Outcome: Progressing  Flowsheets (Taken 12/13/2022 1527)  Discharge to home or other facility with appropriate resources:   Identify barriers to discharge with patient and caregiver   Identify discharge learning needs (meds, wound care, etc)   Arrange for needed discharge resources and transportation as appropriate   Refer to discharge planning if patient needs post-hospital services based on physician order or complex needs related to functional status, cognitive ability or social support system  12/13/2022 0831 by Donis Eden RN  Outcome: Progressing  Flowsheets  Taken 12/13/2022 0000 by Donis Eden RN  Discharge to home or other facility with appropriate resources: Identify barriers to discharge with patient and caregiver  Taken 12/12/2022 2000 by Meena Hutchinson RN  Discharge to home or other facility with appropriate resources:   Identify barriers to discharge with patient and caregiver   Arrange for needed discharge resources and transportation as appropriate   Identify discharge learning needs (meds, wound care, etc)     Problem: Neurosensory - Adult  Goal: Achieves stable or improved neurological status  12/13/2022 1527 by Jarad Gold RN  Outcome: Not Progressing  Flowsheets (Taken 12/13/2022 1527)  Achieves stable or improved neurological status:   Assess for and report changes in neurological status   Maintain blood pressure and fluid volume within ordered parameters to optimize cerebral perfusion and minimize risk of hemorrhage   Initiate measures to prevent increased intracranial pressure   Monitor temperature, glucose, and sodium.  Initiate appropriate interventions as ordered  12/13/2022 0831 by Donis Eden RN  Outcome: Progressing  Flowsheets  Taken 12/13/2022 0000 by Donis Eden RN  Achieves stable or improved neurological status:   Assess for and report changes in neurological status   Initiate measures to prevent increased intracranial pressure   Maintain blood pressure and fluid volume within ordered parameters to optimize cerebral perfusion and minimize risk of hemorrhage  Taken 12/12/2022 2000 by Neal Garcia RN  Achieves stable or improved neurological status:   Assess for and report changes in neurological status   Initiate measures to prevent increased intracranial pressure   Maintain blood pressure and fluid volume within ordered parameters to optimize cerebral perfusion and minimize risk of hemorrhage   Monitor temperature, glucose, and sodium.  Initiate appropriate interventions as ordered     Problem: Neurosensory - Adult  Goal: Achieves maximal functionality and self care  12/13/2022 1527 by Elijah Langston RN  Outcome: Not Progressing  Flowsheets (Taken 12/13/2022 1527)  Achieves maximal functionality and self care:   Monitor swallowing and airway patency with patient fatigue and changes in neurological status   Encourage and assist patient to increase activity and self care with guidance from physical therapy/occupational therapy   Encourage visually impaired, hearing impaired and aphasic patients to use assistive/communication devices  12/13/2022 0831 by Solange Cano RN  Outcome: Progressing  Flowsheets  Taken 12/13/2022 0000 by Solange Cano RN  Achieves maximal functionality and self care: Monitor swallowing and airway patency with patient fatigue and changes in neurological status  Taken 12/12/2022 2000 by Neal Garcia RN  Achieves maximal functionality and self care:   Monitor swallowing and airway patency with patient fatigue and changes in neurological status   Encourage and assist patient to increase activity and self care with guidance from physical therapy/occupational therapy   Encourage visually impaired, hearing impaired and aphasic patients to use assistive/communication devices     Problem: Hematologic - Adult  Goal: Maintains hematologic stability  12/13/2022 1527 by Mattie Phillips RN  Outcome: Progressing  Flowsheets (Taken 12/13/2022 1527)  Maintains hematologic stability:   Assess for signs and symptoms of bleeding or hemorrhage   Monitor labs for bleeding or clotting disorders   Administer blood products/factors as ordered  12/13/2022 0831 by Dionicio Chapman RN  Outcome: Progressing  Flowsheets  Taken 12/13/2022 0000 by Dionicio Chapman RN  Maintains hematologic stability: Assess for signs and symptoms of bleeding or hemorrhage  Taken 12/12/2022 2000 by Sylvester Wheatley RN  Maintains hematologic stability:   Assess for signs and symptoms of bleeding or hemorrhage   Monitor labs for bleeding or clotting disorders   Administer blood products/factors as ordered     Problem: Pain  Goal: Verbalizes/displays adequate comfort level or baseline comfort level  12/13/2022 1527 by Mattie Phillips RN  Outcome: Progressing  Flowsheets (Taken 12/13/2022 1527)  Verbalizes/displays adequate comfort level or baseline comfort level:   Encourage patient to monitor pain and request assistance   Assess pain using appropriate pain scale   Administer analgesics based on type and severity of pain and evaluate response   Consider cultural and social influences on pain and pain management   Implement non-pharmacological measures as appropriate and evaluate response  12/13/2022 0831 by Dionicio Chapman RN  Outcome: Progressing  Flowsheets  Taken 12/13/2022 0400 by Dionicio Chapman RN  Verbalizes/displays adequate comfort level or baseline comfort level: Encourage patient to monitor pain and request assistance  Taken 12/13/2022 0000 by Dionicio Chapman RN  Verbalizes/displays adequate comfort level or baseline comfort level: Encourage patient to monitor pain and request assistance  Taken 12/12/2022 2000 by Sylvester Wheatley RN  Verbalizes/displays adequate comfort level or baseline comfort level:   Encourage patient to monitor pain and request assistance   Assess pain using appropriate pain scale   Administer analgesics based on type and severity of pain and evaluate response     Problem: Safety - Adult  Goal: Free from fall injury  12/13/2022 1527 by Brayden Barraza RN  Outcome: Progressing  Flowsheets (Taken 12/13/2022 1527)  Free From Fall Injury: Instruct family/caregiver on patient safety  12/13/2022 0831 by Tess Adams RN  Outcome: Progressing  Flowsheets (Taken 12/12/2022 1819 by Brayden Barraza RN)  Free From Fall Injury:   Instruct family/caregiver on patient safety   Based on caregiver fall risk screen, instruct family/caregiver to ask for assistance with transferring infant if caregiver noted to have fall risk factors     Problem: Skin/Tissue Integrity  Goal: Absence of new skin breakdown  Description: 1. Monitor for areas of redness and/or skin breakdown  2. Assess vascular access sites hourly  3. Every 4-6 hours minimum:  Change oxygen saturation probe site  4. Every 4-6 hours:  If on nasal continuous positive airway pressure, respiratory therapy assess nares and determine need for appliance change or resting period.   12/13/2022 1527 by Brayden Barraza RN  Outcome: Progressing  Note: No new red or open areas     12/13/2022 0831 by Tess Adams RN  Outcome: Progressing  Note: Q2 turns and PRN     Problem: Chronic Conditions and Co-morbidities  Goal: Patient's chronic conditions and co-morbidity symptoms are monitored and maintained or improved  Outcome: Progressing  Flowsheets (Taken 12/13/2022 1527)  Care Plan - Patient's Chronic Conditions and Co-Morbidity Symptoms are Monitored and Maintained or Improved:   Monitor and assess patient's chronic conditions and comorbid symptoms for stability, deterioration, or improvement   Collaborate with multidisciplinary team to address chronic and comorbid conditions and prevent exacerbation or deterioration   Update acute care plan with appropriate goals if chronic or comorbid symptoms are exacerbated and prevent overall improvement and discharge     Problem: Neurosensory - Adult  Goal: Achieves stable or improved neurological status  12/13/2022 1527 by Ambika Klein RN  Outcome: Not Progressing  Flowsheets (Taken 12/13/2022 1527)  Achieves stable or improved neurological status:   Assess for and report changes in neurological status   Maintain blood pressure and fluid volume within ordered parameters to optimize cerebral perfusion and minimize risk of hemorrhage   Initiate measures to prevent increased intracranial pressure   Monitor temperature, glucose, and sodium. Initiate appropriate interventions as ordered  12/13/2022 0831 by Maura Alegre RN  Outcome: Progressing  Flowsheets  Taken 12/13/2022 0000 by aMura Alegre RN  Achieves stable or improved neurological status:   Assess for and report changes in neurological status   Initiate measures to prevent increased intracranial pressure   Maintain blood pressure and fluid volume within ordered parameters to optimize cerebral perfusion and minimize risk of hemorrhage  Taken 12/12/2022 2000 by Denny Locke RN  Achieves stable or improved neurological status:   Assess for and report changes in neurological status   Initiate measures to prevent increased intracranial pressure   Maintain blood pressure and fluid volume within ordered parameters to optimize cerebral perfusion and minimize risk of hemorrhage   Monitor temperature, glucose, and sodium.  Initiate appropriate interventions as ordered  Goal: Achieves maximal functionality and self care  12/13/2022 1527 by Ambika Klein RN  Outcome: Not Progressing  Flowsheets (Taken 12/13/2022 1527)  Achieves maximal functionality and self care:   Monitor swallowing and airway patency with patient fatigue and changes in neurological status   Encourage and assist patient to increase activity and self care with guidance from physical therapy/occupational therapy   Encourage visually impaired, hearing impaired and aphasic patients to use assistive/communication devices  12/13/2022 0831 by Arturo Will RN  Outcome: Progressing  Flowsheets  Taken 12/13/2022 0000 by Arturo Will RN  Achieves maximal functionality and self care: Monitor swallowing and airway patency with patient fatigue and changes in neurological status  Taken 12/12/2022 2000 by Ulysses Wilson RN  Achieves maximal functionality and self care:   Monitor swallowing and airway patency with patient fatigue and changes in neurological status   Encourage and assist patient to increase activity and self care with guidance from physical therapy/occupational therapy   Encourage visually impaired, hearing impaired and aphasic patients to use assistive/communication devices  Care plan reviewed with patient and family. Patient and family  verbalize understanding of the plan of care and contribute to goal setting.

## 2022-12-13 NOTE — PROGRESS NOTES
[address] [place] [id number]  INPATIENT PHYSICAL THERAPY  EVALUATION  [facility] 4A - [id number]/[id number]    Time In: 8283  Time Out: 1230  Timed Code Treatment Minutes: 25 Minutes  Minutes: 38          Date: 2022  Patient Name: Caterina Ford,  Gender:  female        MRN: [id number]  : 1960  (65 y.o.)      Referring Practitioner: James Will PA-C  Diagnosis: Acute CVA  Additional Pertinent Hx: Caterina Ford who is a 65 y.o. female with a history of strokes with reported residual left sided weakness, DM2, HLD, HTN who presented to [facility] ED this morning as an EMS activated stroke alert, initiated at 46. Patient comes from a nursing facility who reported last known well at 0925 this am. EMS reports baseline left side weakness and dysarthria secondary to previous stroke with acute worsening of these deficits this morning while working with therapy. EMS reports patient was leaning to the left and neglecting her left visual field. Initial NIH 6  - partial hemianopia L eye, LUE drift, LLE drift, left sided sensory deficit, dysarthria, intermittent command following. While in CT, patient noted to actively move LUE/LLE anti-gravity, seemingly waxing and waning. CT head negative for acute intracranial abnormalities. CTA head and neck revealed bilateral ICA occlusions. On-call neuro interventionalist, Dr. Earnestine Parker, on site in CT and recommended giving TNK and TNK administered 1108 . MRI shows Moderate-sized acute infarct involving portions of the right frontoparietal junction, the posterior right frontal lobe, the posterior right temporal lobe and the anterior inferior right occipital lobe and subacute infarct involving the superior right occipital lobe. There is some associated laminar necrosis. Xray L shoulder ordered .      Restrictions/Precautions:  Restrictions/Precautions: General Precautions, Fall Risk  Position Activity Restriction  Other position/activity restrictions: L hemianopsia, L neglect, pusher    Subjective:  Chart Reviewed: Yes  Patient assessed for rehabilitation services?: Yes  Family / Caregiver Present: Yes (brother)  Subjective: RN approved session, pt is supine in bed and agreeable. Pt somwhat lethargic, oriented x 4, brother arrives during session. Pt noted to have large bruising over L shoulder and scapular region, states she fell off the toilet at the SNF. Xray ordered. Pt with difficulty focusing on task, noted to have L neglect and possibly pushing L when sitting EOB. General:  Overall Orientation Status: Within Functional Limits     Hearing: Within functional limits       Pain: denies    Vitals: Vitals not assessed per clinical judgement, see nursing flowsheet    Social/Functional History:    Type of Home: Facility  Home Layout: One level  Home Equipment: Walker, rolling      ADL Assistance: Needs assistance     Ambulation Assistance: Needs assistance  Transfer Assistance: Needs assistance    Active : No     Additional Comments: Prior to recent hospitalization for CVA and transfer to [facility] for rehab, pt had been living at home alone, independent with all ADL/IADL and mobility. Pt had been at [facility] for rehab since 12/2, was walking to/from bathroom with staff utilizing RW per family report.     OBJECTIVE:  Range of Motion:  Right Lower Extremity: WFL  Left Lower Extremity: WFL    Strength:  Right Lower Extremity: WFL  Left Lower Extremity: Impaired - 4-/5 with impaired coordination  Left Upper Extremity:  Impaired - 0/5    Balance:  Static Sitting Balance:  Minimal Assistance- Maximum Assistance, X 1, with verbal cues   **Pt sits EOB ~15 minutes, L sided lean, not resistive initially, continues to lean L with R hand placed on R leg, able to self correct with verbal cues, significant lean L with increased fatigue requiring max A to correct, questionable lethargy/weakness vs pushing; pt distracted, cues to focus on balance and midline positioning    Bed Mobility:  Rolling to Right: Maximum Assistance, X 1, with head of bed flat, with rail, with verbal cues , with increased time for completion   Supine to Sit: Maximum Assistance, X 1, with head of bed flat, with rail, with verbal cues , with increased time for completion  Sit to Supine: Maximum Assistance, X 2, with head of bed flat, with rail   Scooting: Maximum Assistance, X 1    Transfers:  Sit to Stand: Maximum Assistance, X 2, cues for hand placement, with verbal cues  Stand to Sit:Maximum Assistance, X 2, cues for hand placement, with verbal cues  **Attempted sit to stand transfer x 2 trials, very wide ALL, L UE cradled due to flaccidity, poor balance, L knee hyperextended, forward flexed posture    Exercise:  Patient was guided in 1 set(s) 10 reps of exercise to both lower extremities. Ankle pumps, Heelslides, and Hip abduction/adduction. Exercises were completed for increased independence with functional mobility. Min A for L LE for controlled movement. Functional Outcome Measures: Completed  -PAC Inpatient Mobility without Stair Climbing Raw Score : 7  AM-PAC Inpatient without Stair Climbing T-Scale Score : 28.66    ASSESSMENT:  Activity Tolerance:  Patient tolerance of  treatment: fair. Treatment Initiated: Treatment and education initiated within context of evaluation. Evaluation time included review of current medical information, gathering information related to past medical, social and functional history, completion of standardized testing, formal and informal observation of tasks, assessment of data and development of plan of care and goals. Treatment time included skilled education and facilitation of tasks to increase safety and independence with functional mobility for improved independence and quality of life. Assessment:   Body Structures, Functions, Activity Limitations Requiring Skilled Therapeutic Intervention: Decreased functional mobility , Decreased safe awareness, Decreased cognition, Decreased coordination, Decreased endurance, Decreased posture, Decreased balance, Decreased strength, Decreased vision/visual deficit  Assessment: Minh Hernandez is a 65 y.o. female that presents with CVA. she is ind prior to admission now requiring assist for basic mobility. Pt demonstrates a decrease in baseline by way of bed mobility, transfers and ambulation secondary to decreased activity tolerance, strength, fatigue, and balance deficits. Pt will benefit from skilled PT services throughout admission and beyond hospital discharge for improvements in functional mobility and in order to decrease fall risk and return pt to PLOF. Therapy Prognosis: Good    Requires PT Follow-Up: Yes    Discharge Recommendations:  Discharge Recommendations: IP Rehab    Patient Education:      . Patient Education  Education Given To: Patient, Family  Education Provided: Role of Therapy, Plan of Care, Transfer Training  Education Method: Verbal  Barriers to Learning: Cognition  Education Outcome: Continued education needed       Equipment Recommendations:  Equipment Needed: No    Plan:  Current Treatment Recommendations: Strengthening, Balance training, Functional mobility training, Transfer training, Neuromuscular re-education, Safety education & training, Therapeutic activities, Patient/Caregiver education & training  General Plan:  (6x N)    Goals:  Patient Goals : get better  Short Term Goals  Time Frame for Short Term Goals: by discharge  Short Term Goal 1: Pt to transfer supine <--> sit min A to enable pt to get in/out of bed. Short Term Goal 2: Pt to sit EOB with SBA for balance for improved core strength in prep for transfers. Short Term Goal 3: Pt to transfer sit <--> stand mod A for increased functional mobility. Short Term Goal 4: PT to assess pivot transfer or ambulation when applicable. Long Term Goals  Time Frame for Long Term Goals : NA due to short length of stay.     Following session, patient left in safe position with all fall risk precautions in place.

## 2022-12-13 NOTE — CONSULTS
Physical Medicine & Rehabilitation   Consult Note      Admitting Physician: Genna Khan MD    Primary Care Provider: Michael Nunn DO     Reason for Consult:  CVA. Rehab needs    History of Present Illness:  Rick Dorantes is a 65 y.o. female admitted to [facility] on 12/12/2022. Patient  has a past medical history of Asthma, mild persistent, Depression with anxiety, DM2 (diabetes mellitus, type 2) ([facility] [address] Dyslipidemia, GERD (gastroesophageal reflux disease), Glaucoma, History of CVA (cerebrovascular accident), Hypertension, essential, Microalbuminuria due to type 2 diabetes mellitus ([facility] [address] Nicotine dependence, Umbilical hernia, and Urge urinary incontinence. Patient presented to [facility] ER from SNF with with NIH 5 with limb ataxia, hemianopia, and dysarthria. Patient with recent hospitalization for CVA:     11/24/22 HOSPITALIZATION AT [facility]/Hospital course:  Rick Dorantes is a 65 y.o. right-handed female was initially taken to [facility] by brother after she was noted to have difficulty walking and vision disturbance. At outside hospital, she was found to have left arm weakness left hemineglect on right gaze deviation. Stroke alert was called at that time and CT head and CTA of the head and neck were performed and case was discussed with tele neurology team. CT head was compromised by motion artifact but did report an acute subacute infarct of the right occipital region. CTs of the head and neck were notable for bilateral ICA occlusions. She was transferred to [facility] for CT perfusion on 11/25/22. On arrival CT perfusion deficit was noted but decision was made the patient did not need to go to the cath lab symptoms had improved. NIHSS was 6.     She was admitted to the neuro ICU initially and underwent MRI of the brain which revealed a Large area of acute/subacute ischemia in the right occipital lobe with additional areas of acute ischemia in the right posterior frontal and parietal cortex. She underwent a carotid vascular ultrasound which showed bilateral occlusions of the ICAs. She was started on dual antiplatelet therapy. \"    Patient was discharge to SNF as recommended by therapy team at [facility]. Patient was admitted to [facility] 12/2/22- 12/12/22. THIS ADMISSION: patient presented to [facility] ED 12/12/22as an EMS activated stroke alert, initiated at 1043. Patient is from a nursing facility who reported last known well at [id number] 12/12/22. EMS reports baseline left side weakness and dysarthria secondary to previous stroke with acute worsening of these deficits that morning while working with therapy. EMS reports patient was leaning to the left and neglecting her left visual field. POC glucose 163 in route, per EMS. Initial /85. Noted to be given Zofran while in EMS due to nausea and one Initial NIH 6  - partial hemianopia L eye, LUE drift, LLE drift, left sided sensory deficit, dysarthria, intermittent command following. While in CT, patient noted to actively move LUE/LLE anti-gravity, seemingly waxing and waning. Outpatient medications include , Lipitor 40, Plavix 75 mg. CT head negative for acute intracranial abnormalities. Cr 0.78. CTA head and neck revealed bilateral ICA occlusions. Contraindications for TNK ruled out and TNK administered 1108. Patient was admitted to [facility] ICU for further workup and care. MRI brain complete 12/13/22 showed Moderate-sized acute infarct involving portions of the right frontoparietal junction, the posterior right frontal lobe, the posterior right temporal lobe and the anterior inferior right occipital lobe. Patient was stabilized and transfer to  neuro stepdown on 12/13/22. Patient was seen by SLP and recommended NPO until MBS can be completed. Patient resting in bed. Brother at bedside. Left face asymmetry with aphasia noted. Discussed therapy needs and current deficits.  Explained realistic timelines for improvement and transitioning home vs what can be accommodated on IPR. Explained if approved for IPR, there is a risk of denial of SNF after IPR. Patient was happy with care at [facility]. Brother was happy with care as well, just disappointed in communication with this admission. Family found out from an alert on MyChart, not from the SNF. Patient and brother agree to return to [facility] at discharge. Current Rehabilitation Assessments:  PT:    Range of Motion:  Right Lower Extremity: WFL  Left Lower Extremity: WFL  Strength:  Right Lower Extremity: WFL  Left Lower Extremity: Impaired - 4-/5 with impaired coordination  Left Upper Extremity:  Impaired - 0/5  Balance:  Static Sitting Balance:  Minimal Assistance- Maximum Assistance, X 1, with verbal cues   **Pt sits EOB ~15 minutes, L sided lean, not resistive initially, continues to lean L with R hand placed on R leg, able to self correct with verbal cues, significant lean L with increased fatigue requiring max A to correct, questionable lethargy/weakness vs pushing; pt distracted, cues to focus on balance and midline positioning  Bed Mobility:  Rolling to Right: Maximum Assistance, X 1, with head of bed flat, with rail, with verbal cues , with increased time for completion   Supine to Sit: Maximum Assistance, X 1, with head of bed flat, with rail, with verbal cues , with increased time for completion  Sit to Supine: Maximum Assistance, X 2, with head of bed flat, with rail   Scooting: Maximum Assistance, X 1  Transfers:  Sit to Stand: Maximum Assistance, X 2, cues for hand placement, with verbal cues  Stand to Sit:Maximum Assistance, X 2, cues for hand placement, with verbal cues  **Attempted sit to stand transfer x 2 trials, very wide ALL, L UE cradled due to flaccidity, poor balance, L knee hyperextended, forward flexed posture  Exercise:  Patient was guided in 1 set(s) 10 reps of exercise to both lower extremities.   Ankle pumps, Heelslides, and Hip abduction/adduction. Exercises were completed for increased independence with functional mobility. Min A for L LE for controlled movement. OT:   VISION: Pt reported wears glasses for reading only. Pt noted to have max difficulty scanning to L visual fields despite max verbal/visual cuing. With 1 eye occluded, attempted visual scanning and pt continued to demo max difficulty scanning to L field of vision. Compensation noted with turning head fully to L, although eyes remained to R. Pt only able to scan past midline to L on 1 occasion during session. HEARING:  WFL  COGNITION: Slow Processing, Decreased Recall, Decreased Insight, Impaired Memory, Inattention, Decreased Problem Solving, Decreased Safety Awareness, Difficulty Following Commands, Impulsive, and Expressive Aphasia  RANGE OF MOTION:  Right Upper Extremity: WFL  Left Upper Extremity:  Impaired - no active movement noted  **No subluxation noted at L shoulder on this date. STRENGTH:  Right Upper Extremity: WFL, grossly 5/5  Left Upper Extremity:  Impaired - 0/5  SENSATION:   Decreased sensation to light touch noted in L hemibody in addition to decreased proprioception/kinesthesia. ADL:   Grooming: Maximum Assistance. For washing face while seated at EOB. Mod-max cues for washing L side of face. BALANCE:  Sitting Balance: Moderate Assistance. Primarily while seated at EOB. Did have periods where required minimal assistance, although as fatigued required max assistance. Pt tolerated sitting at EOB with poor awareness of midline orientation, requiring max verbal and tactile cues to correct. Pt noted to be pushing to L when RUE placed in support on bedrail, although when RUE transferred to lap, pt demoed improved posture/decrease pushing to L. Max cues to scan to L hemibody/environment while seated at EOB.  Shifts/repositions frequently while seated at EOB placing pt at significantly high risk of fall due to poor awareness of body/position. BED MOBILITY:  Rolling to Left: Maximum Assistance, X 1-2, with head of bed flat, with rail, with verbal cues , with increased time for completion   Rolling to Right: Maximum Assistance, X 1-2, with head of bed flat, with verbal cues , with increased time for completion    Supine to Sit: Maximum Assistance, X 1, with head of bed raised, with verbal cues , with increased time for completion . Pt able to move LLE to advance to side of bed  Sit to Supine: Maximum Assistance, X 2, with head of bed raised, with verbal cues , with increased time for completion ; noted to have increased extensor tone in LLE once returned to supine although subsided within ~15 seconds of light stretch  Scooting: Maximum Assistance ; advancing hips forward to EOB     TRANSFERS:  OTR to further assess as able/appropriate. Not appropriate to assess this date due to pt's impulsivity, poor awareness, and poor trunk control while seated. ST:   COGNITION:  Sharad Cognitive Assessment (MOCA) version 7.1 completed. Patient scored 13/25. Normal is greater than or equal to 21/25. *Inclusion of +1 point given highest level of education achieved less than/equal to 12th grade or GED with limited-0 post-secondary schooling   **Adjusted score given patient not able to complete visuospatial/executive functioning subtests   Orientation: 6/6  Immediate Recall: 4/5  Short-Term Recall: 0/5  Divergent Naming: Impaired  Problem Solving: 3/3  Reasonin/2 verbal  Sequencin/1  Thought Organization: Impaired  Insight: Concerns for functional implications  Attention: Higher level deficits  Math Computation: 1/3 serial subtraction   Executive Functioning: Impaired    DIAGNOSTIC IMPRESSIONS:  Clinical Swallow Evaluation: Patient presents with moderate oral dysphagia with inability to fully discern potential presence of pharyngeal phase deficits without formal instrumentation.  Oral phase compounded d/t significance of lingual body weakness to impact overall mastication+manipulation skills for achievement of AP transit. Pharyngeal trigger consistently achieved, however, concerns prevalent for a potential dysfunction s/t acuity of intracranial findings and pertinent hx for CVA. Recommendations to maintain NPO diet level, exceptions for ice chips post oral care and critical/crucial medications crushed in puree (as pharmacy permits). F/u formal instrumentation via MBS warranted for further diagnostic assessment to r/o deficits and to optimize dysphagia management POC development. *post evaluation, patient without respiratory distress upon leaving room; RN Judd notified re: clinical findings and recommendations from the assessment; verbal receptiveness noted      Speech, Language, Cognitive Evaluation: Patient presents with a moderate cognitive impairment as derived by clinical findings outlined above to negatively influence abilities to return to ADL/IADL completion per baseline function in independent living. Expressive and receptive language domains grossly intact with no apparent communicative breakdowns. Speech intelligibility best approximates 40% given presence of blended word boundaries, imprecise articulatory placement, and slow rate of speech output; dysarthria notable. Skilled ST services are recommended to address the above aforementioned deficits to improve cognitive communication skills for optimal integration into current/future settings.          Past Medical History:        Diagnosis Date    Asthma, mild persistent     Depression with anxiety     DM2 (diabetes mellitus, type 2) (HCC)     Dyslipidemia     GERD (gastroesophageal reflux disease)     Glaucoma     History of CVA (cerebrovascular accident)     x 2 per pt; last was ~2010    Hypertension, essential     Microalbuminuria due to type 2 diabetes mellitus ([address]     Nicotine dependence     Umbilical hernia     as a complication  after cholecystectomy per pt    Urge urinary incontinence        Past Surgical History:        Procedure Laterality Date    CATARACT REMOVAL WITH IMPLANT Bilateral     CHOLECYSTECTOMY      COLONOSCOPY  2014, 2016    FOOT SURGERY Right     10/17/13, hardware placed and removed    TONSILLECTOMY      UPPER GASTROINTESTINAL ENDOSCOPY  2014, 2016       Allergies:     Allergies   Allergen Reactions    Latex         Current Medications:   Current Facility-Administered Medications: cefTRIAXone (ROCEPHIN) 1,000 mg in dextrose 5 % 50 mL IVPB mini-bag, 1,000 mg, IntraVENous, Q24H  atenolol (TENORMIN) tablet 25 mg, 25 mg, Oral, Daily  amLODIPine (NORVASC) tablet 10 mg, 10 mg, Oral, Daily  lisinopril (PRINIVIL;ZESTRIL) tablet 20 mg, 20 mg, Oral, Daily  niCARdipine (CARDENE) 25 mg in sodium chloride 0.9 % 250 mL infusion, 2.5-15 mg/hr, IntraVENous, Continuous  atorvastatin (LIPITOR) tablet 80 mg, 80 mg, Oral, Nightly  glucose chewable tablet 16 g, 4 tablet, Oral, PRN  dextrose bolus 10% 125 mL, 125 mL, IntraVENous, PRN **OR** dextrose bolus 10% 250 mL, 250 mL, IntraVENous, PRN  glucagon (rDNA) injection 1 mg, 1 mg, IntraMUSCular, PRN  dextrose 10 % infusion, , IntraVENous, Continuous PRN  insulin lispro (HUMALOG) injection vial 0-4 Units, 0-4 Units, SubCUTAneous, TID WC  insulin lispro (HUMALOG) injection vial 0-4 Units, 0-4 Units, SubCUTAneous, Nightly  sodium chloride flush 0.9 % injection 5-40 mL, 5-40 mL, IntraVENous, 2 times per day  sodium chloride flush 0.9 % injection 5-40 mL, 5-40 mL, IntraVENous, PRN  0.9 % sodium chloride infusion, , IntraVENous, PRN  ondansetron (ZOFRAN-ODT) disintegrating tablet 4 mg, 4 mg, Oral, Q8H PRN **OR** ondansetron (ZOFRAN) injection 4 mg, 4 mg, IntraVENous, Q6H PRN  polyethylene glycol (GLYCOLAX) packet 17 g, 17 g, Oral, Daily PRN  acetaminophen (TYLENOL) tablet 650 mg, 650 mg, Oral, Q6H PRN **OR** acetaminophen (TYLENOL) suppository 650 mg, 650 mg, Rectal, Q6H PRN  promethazine (PHENERGAN) injection 25 mg, 25 mg, IntraMUSCular, Q4H PRN    Social History:  Social History     Socioeconomic History    Marital status: Single     Spouse name: Not on file    Number of children: Not on file    Years of education: Not on file    Highest education level: Not on file   Occupational History    Not on file   Tobacco Use    Smoking status: Every Day     Packs/day: 1.00     Years: 33.00     Pack years: 33.00     Types: Cigarettes    Smokeless tobacco: Never   Substance and Sexual Activity    Alcohol use: No    Drug use: No    Sexual activity: Not on file   Other Topics Concern    Not on file   Social History Narrative    Not on file     Social Determinants of Health     Financial Resource Strain: Not on file   Food Insecurity: Not on file   Transportation Needs: Not on file   Physical Activity: Not on file   Stress: Not on file   Social Connections: Not on file   Intimate Partner Violence: Not on file   Housing Stability: Not on file     Type of Home: Facility  Home Layout: One level  Home Equipment: Walker, rolling   ADL Assistance: Needs assistance  Ambulation Assistance: Needs assistance  Transfer Assistance: Needs assistance     Active : No  Additional Comments: Prior to recent hospitalization for CVA and transfer to [facility] for rehab, pt had been living at home alone, independent with all ADL/IADL and mobility. Pt had been at [facility] for rehab since 12/2, was walking to/from bathroom with staff utilizing RW per family report.     Family History:       Problem Relation Age of Onset    Diabetes Mother     Glaucoma Mother     Heart Attack Father     Diabetes Sister     Breast Cancer Sister     Diabetes Brother     Colon Polyps Brother 52    Colon Polyps Sister 54    Colon Cancer Neg Hx        Review of Systems:  CONSTITUTIONAL:  positive for  fatigue  EYES:  negative  HEENT:  negative  RESPIRATORY:  negative  CARDIOVASCULAR:  HTN on cardene gtt  GASTROINTESTINAL:  negative  GENITOURINARY:  negative  SKIN:  bruising left shoulder from fall  HEMATOLOGIC/LYMPHATIC:  negative  MUSCULOSKELETAL:  positive for  muscle weakness  NEUROLOGICAL:  positive for headaches, dizziness, memory problems, speech problems, coordination problems, gait problems, dysphagia, and weakness  BEHAVIOR/PSYCH:  negative  System review otherwise negative    Physical Exam:  BP (!) 140/63   Pulse 69   Temp 99.1 °F (37.3 °C) (Oral)   Resp 17   Wt 215 lb 2.7 oz (97.6 kg)   SpO2 99%   BMI 34.73 kg/m²   awake  Orientation:   aphasia/dysarthria  Mood: within normal limits  Affect: mostly calm, slight restless at times due to uncomfortable  General appearance: Patient is well nourished, well developed, appearing older than stated age    Memory:   abnormal - difficult to assess due to aphasia,   Attention/Concentration: normal with right eye  Language:  abnormal - aphasia and dysarthria     Cranial Nerves:  left facial droop  ROM:  limited LUE  Motor Exam:  Motor exam is 5 out of 5 right upper and right lower extremities  Motor exam is 0 out of 5 left upper and 4- out of 5 left lower extremities  Tone:  normal  Muscle bulk: within normal limits  Sensory:  Sensory intact  Coordination:   abnormal - left upper and lower    Heart: normal rate, regular rhythm, normal S1, S2, no murmurs, rubs, clicks or gallops  Lungs: clear to auscultation without wheezes or rales  Abdomen: soft, non-tender, non-distended, normal bowel sounds, no masses or organomegaly    Skin: warm and dry, no rash or erythema  Peripheral vascular: Pulses: Normal upper and lower extremity pulses; Edema: trace      Diagnostics:  Recent Results (from the past 24 hour(s))   Lactic Acid    Collection Time: 12/12/22  4:35 PM   Result Value Ref Range    Lactic Acid 1.8 0.5 - 2.0 mmol/L   POCT Glucose    Collection Time: 12/12/22  6:53 PM   Result Value Ref Range    POC Glucose 181 (H) 70 - 108 mg/dl   POCT Glucose    Collection Time: 12/12/22  8:48 PM   Result Value Ref Range    POC Glucose 159 (H) 70 - 108 mg/dl   POCT Glucose    Collection Time: 12/13/22 12:54 AM   Result Value Ref Range    POC Glucose 115 (H) 70 - 108 mg/dl   Blood Gas, Arterial    Collection Time: 12/13/22 12:56 AM   Result Value Ref Range    pH, Blood Gas 7.36 7.35 - 7.45    PCO2 59 (H) 35 - 45 mmhg    PO2 39 (LL) 71 - 104 mmhg    HCO3 33 (H) 23 - 28 mmol/l    Base Excess (Calculated) 5.6 (H) -2.5 - 2.5 mmol/l    O2 Sat 70 %    IFIO2 2     DEVICE BiPAP     Liam Test Positive     Source: R Radial     COLLECTED BY: [id number]    Urine with Reflexed Micro    Collection Time: 12/13/22  1:50 AM   Result Value Ref Range    Glucose, Ur NEGATIVE NEGATIVE mg/dl    Bilirubin Urine NEGATIVE NEGATIVE    Ketones, Urine NEGATIVE NEGATIVE    Specific Gravity, Urine 1.027 1.002 - 1.030    Blood, Urine TRACE (A) NEGATIVE    pH, UA 6.5 5.0 - 9.0    Protein, UA TRACE (A) NEGATIVE    Urobilinogen, Urine 1.0 0.0 - 1.0 eu/dl    Nitrite, Urine POSITIVE (A) NEGATIVE    Leukocyte Esterase, Urine SMALL (A) NEGATIVE    Color, UA YELLOW STRAW-YELLOW    Character, Urine CLOUDY (A) CLEAR-SL CLOUD    RBC, UA 0-2 0-2/hpf /hpf    WBC, UA 15-25 0-4/hpf /hpf    Epithelial Cells, UA 3-5 3-5/hpf /hpf    Bacteria, UA MANY FEW/NONE SEEN /hpf    Casts UA NONE SEEN NONE SEEN /lpf    Crystals, UA NONE SEEN NONE SEEN    Renal Epithelial, UA NONE SEEN NONE SEEN    Yeast, UA NONE SEEN NONE SEEN    CASTS 2 NONE SEEN NONE SEEN /lpf    MISCELLANEOUS 2 NONE SEEN    CBC    Collection Time: 12/13/22  4:32 AM   Result Value Ref Range    WBC 9.0 4.8 - 10.8 thou/mm3    RBC 4.12 (L) 4.20 - 5.40 mill/mm3    Hemoglobin 12.3 12.0 - 16.0 gm/dl    Hematocrit 38.5 37.0 - 47.0 %    MCV 93.4 81.0 - 99.0 fL    MCH 29.9 26.0 - 33.0 pg    MCHC 31.9 (L) 32.2 - 35.5 gm/dl    RDW-CV 14.7 (H) 11.5 - 14.5 %    RDW-SD 50.2 (H) 35.0 - 45.0 fL    Platelets 997 687 - 688 thou/mm3    MPV 10.6 9.4 - 12.4 fL   Basic Metabolic Panel w/ Reflex to MG    Collection Time: 12/13/22  4:32 AM   Result Value Ref Range    Sodium 142 135 - 145 meq/L Potassium reflex Magnesium 3.6 3.5 - 5.2 meq/L    Chloride 100 98 - 111 meq/L    CO2 32 23 - 33 meq/L    Glucose 122 (H) 70 - 108 mg/dL    BUN 8 7 - 22 mg/dL    Creatinine 0.6 0.4 - 1.2 mg/dL    Calcium 9.4 8.5 - 10.5 mg/dL   Lipid Panel    Collection Time: 12/13/22  4:32 AM   Result Value Ref Range    Cholesterol, Total 136 100 - 199 mg/dL    Triglycerides 149 0 - 199 mg/dL    HDL 35 mg/dL    LDL Calculated 71 mg/dL   Hemoglobin A1C    Collection Time: 12/13/22  4:32 AM   Result Value Ref Range    Hemoglobin A1C 7.3 (H) 4.4 - 6.4 %    AVERAGE GLUCOSE 159 (H) 70 - 126 mg/dL   Anion Gap    Collection Time: 12/13/22  4:32 AM   Result Value Ref Range    Anion Gap 10.0 8.0 - 16.0 meq/L   Glomerular Filtration Rate, Estimated    Collection Time: 12/13/22  4:32 AM   Result Value Ref Range    Est, Glom Filt Rate >60 >60 ml/min/1.73m2   POCT Glucose    Collection Time: 12/13/22  8:56 AM   Result Value Ref Range    POC Glucose 148 (H) 70 - 108 mg/dl   POCT Glucose    Collection Time: 12/13/22 12:07 PM   Result Value Ref Range    POC Glucose 138 (H) 70 - 108 mg/dl   Platelet U2W21 Inhibition    Collection Time: 12/13/22  1:10 PM   Result Value Ref Range    P2Y12 ASSAY 212 180 - 376 PRU       PROCEDURE: MRI BRAIN WO CONTRAST 12/13/22       CLINICAL INFORMATION 24 hours post-TNK administration (12/12/22 1108). COMPARISON: Head CT 12/13/2022. TECHNIQUE: Multiplanar and multiple spin echo MRI images were obtained of the brain without contrast.       FINDINGS:               On the diffusion-weighted images, there is abnormal high signal on the right at the frontoparietal junction. This also extends into the more inferior aspects of the right frontal lobe. This involves portions of the posterior right temporal lobe and the    anterior medial right occipital lobe. This also involves the insular cortex. There is corresponding low signal on ADC map.  There is corresponding high signal on the FLAIR and T2-weighted sequences. This is consistent with a moderate-sized acute infarct. In the right occipital lobe, more superiorly, there is some high signal on the diffusion-weighted images and ADC map. This does not involve the cortex. In the cortex at this location, there is high signal on the T1-weighted images. This particular    portion of the infarct may be subacute. Of note, this area did have low attenuation on the head CT. The brain volume is reduced. There is a moderate amount of abnormal signal in the white matter of the brain seen on the FLAIR and T2-weighted sequences. This is consistent with moderate severity chronic small vessel ischemic changes. There are old    infarcts in the left frontal lobe and left parietal lobe. There is an old lacunar type infarct in the medial right basal ganglia. There are no intra-or extra-axial collections. There is no hydrocephalus or midline shift. There is evidence of old hemorrhage associated with the medial right basal ganglia. There is no evidence of susceptibility artifact associated with the acute infarcts on the right. There is some high signal on the T1-weighted images associated with the    infarct in the right occipital lobe consistent with some laminar necrosis. This does not have associated susceptibility artifact. The internal carotid artery flow voids are abnormal at the skull base bilaterally. The supraclinoid segments are normal.       The midline craniocervical junction structures are normal.  The pituitary gland and brainstem are normal.                       Impression       1. Moderate-sized acute infarct involving portions of the right frontoparietal junction, the posterior right frontal lobe, the posterior right temporal lobe and the anterior inferior right occipital lobe. 2. Subacute infarct involving the superior right occipital lobe. There is some associated laminar necrosis.    3. Old infarcts in the left frontal and parietal lobes.             11/28/22  Procedure Notes  - documented in this encounter  Nereida Krause MD - 11/28/2022 11:10 AM EST  Formatting of this note might be different from the original.  Gomez Barnes is a 65 y.o. woman with right PCA distribution infarction in setting of bilateral internal carotid artery occlusion. Cerebral angiography was recommended. The risks, alternates and benefits of the procedure were discussed with the patient. Up to 1 percent risk of complication including but not limited to arterial injury, stroke, intercerebral hemorrhage, contrast nephropathy, groin complication and/or death was quoted. Consent was obtained. Procedure:     Gomez Barnes was identified and brought to the neuro IR suite. Patient was placed supine on the angio table. Bilateral groins were shaved, prepped and the patient was draped in the usual sterile manner. Using modified Seldinger technique a 5 [place]inamese sheath was advanced in the right common femoral artery. Through this sheath we now advanced a 5 [place] [employer] Ol[place]a 2 catheter over an 035 wire into the left subclavian artery. Selective catheterization and angiography of the left vertebral, left common carotid, right common carotid and right common femoral arteries were performed. Filming was done in AP, lateral and oblique projections using digital format. At the end of the procedure the catheter and sheath were removed. Groin was closed using Mynx device. There was no undue bleeding. This was a technically difficult study due to patient's restlessness leading to motion related artifact. Findings:    Left Subclavian Artery Angiogram: The left subclavian artery angiogram shows normal opacification of the left subclavian artery and its branches. The origin of the left vertebral artery is identified. The left vertebral artery makes a loop at the origin.     Left Vertebral Artery Angiogram: The left vertebral artery angiogram shows good opacification of the left vertebral artery through its extra and intracranial segments. This is the dominant vertebral artery. Reflux into the intracranial right vertebral artery is noted. The basilar artery is filling well through its course. At the basilar apex left posterior cerebral artery is identified. Through an anterior temporal artery, filling into the left temporal region is noted. Also, retrograde filling into the left GILDARDO through terminal PCA collaterals is noted. The right posterior cerebral artery is occluded at the origin. Left Common Carotid Artery Angiogram: The left common carotid artery angiogram shows good opacification of the common carotid artery up to the level of its bifurcation. The left internal carotid artery ends in a stump near its takeoff and is completely occluded at the origin. The left external carotid artery and its branches fill well. Through the ethmoidal collaterals, retrograde filling into the left ophthalmic artery is noted. Retrograde filling into the cavernous and ascending petrous segments of the left internal carotid artery is appreciated. No reconstitution of either the left middle or the anterior cerebral arteries is noted on this run. Some faint filling of the intracranial left frontal and temporal MCA branches through ECA collaterals is noted. Right Common Carotid Artery Angiogram: The right common carotid artery angiogram shows good filling of the right common carotid artery up to the bifurcation. The right internal carotid artery is occluded at the origin. The right external carotid artery and its branches fill well. Through ethmoidal collaterals, retrograde filling into the right ophthalmic artery is noted. Reconstitution of the intracranial ICA circulation with filling into the GILDARDO and the MCA branches is noted. The right ICA also fills in retrograde fashion into the distal cervical segment.     Right Common Femoral Artery Angiogram: The right common femoral artery angiogram shows normal opacification of the right common femoral artery with normal runoff to the distal extremity. Impression:     Remigio Langston underwent diagnostic cerebral angiography. This is an abnormal study that shows the followin. Complete occlusion of the right internal carotid artery at the origin. Intracranial right ICA circulation reconstitutes through retrograde filling into the ophthalmic artery through ECA collaterals. Retrograde opacification of the right internal carotid artery up to its distal cervical segment is also noted. 2. Complete occlusion of the left internal carotid artery at the origin. Intracranial left ICA circulation reconstitutes through left posterior cerebral artery collaterals. 3. Complete occlusion of the right posterior cerebral artery at the origin. Impression:  CVA  Right and left ICA occlusion and right PCA occlusion  Dysphagia  Cognitive deficits  Dysarthria   Left hemiparesis  HTN  NIDDM2 with hyperglycemia  HLD  Depression and anxiety  GERD  Obesity   Hx Mild intermittent asthma    Recommendations:  Continue current therapies  Patient with managed medicare insurance plan, although I do feel patient is appropriate for an aggressive IPR program, her insurance will limit her time on IPR and there will be a risk for SNF denial after IPR. Patient was recently in SNF d/t CVA (with less deficits than now) and after 10 days was not ready to discharge home. Brother understanding with expected timeline for improvement greater than what can be accommodated on IPR. Patient and brother in agreement with returning to SNF    It was my pleasure to evaluate Remigio Langston today. Please call with questions.     ANTONIO Lewis - CNP

## 2022-12-13 NOTE — ACP (ADVANCE CARE PLANNING)
Advance Care Planning     Advance Care Planning Inpatient Note  [facility] Department    Today's Date: 12/13/2022  Unit: [facility] 4D    Received request from IDT Member. Upon review of chart and communication with care team, patient's decision making abilities are not in question. . Patient was/were present in the room during visit. Goals of ACP Conversation:  Discuss advance care planning documents    Health Care Decision Makers:       Primary Decision Maker: Kimberly Garcia - Brother/Sister - [phone]  Summary:  Completed New Documents    Advance Care Planning Documents (Patient Wishes):  Healthcare Power of /Advance Directive Appointment of Health Care Agent  Living Will/Advance Directive     Assessment:  POA document was completed and filed.      Interventions:  Assisted in the completion of documents according to patient's wishes at this time            Electronically signed by Yenifer Sanabria [facility] on 12/13/2022 at 3:14 PM

## 2022-12-13 NOTE — PROGRESS NOTES
Yes  Family / Caregiver Present: Yes (brother, sister)    Subjective: Pt supine in bed upon arrival, dysarthria noted with conversation. Pt agreeable to OT session and stating \"I just want to eat. \"    Pain: Pt c/o pain over L posterior shoulder/scapula; significant bruising noted with resident present to assess while pt seated at EOB. Shoulder xray ordered as pt reported had fallen onto L shoulder on 12/11. Vitals: Blood Pressure: 156/67  Oxygen: 97% on 2L O2 via NC  Heart Rate: 77 bpm  All vitals remained stable throughout    Social/Functional History:  Type of Home: Facility           ADL Assistance: Needs assistance  Ambulation Assistance: Needs assistance  Transfer Assistance: Needs assistance    Active : No     Additional Comments: Prior to recent hospitalization for CVA and transfer to [facility] for rehab, pt had been living at home alone, independent with all ADL/IADL and mobility. Pt had been at [facility] for rehab since 12/2, was walking to/from bathroom with staff utilizing RW per family report. VISION: Pt reported wears glasses for reading only. Pt noted to have max difficulty scanning to L visual fields despite max verbal/visual cuing. With 1 eye occluded, attempted visual scanning and pt continued to demo max difficulty scanning to L field of vision. Compensation noted with turning head fully to L, although eyes remained to R. Pt only able to scan past midline to L on 1 occasion during session. HEARING:  WFL    COGNITION: Slow Processing, Decreased Recall, Decreased Insight, Impaired Memory, Inattention, Decreased Problem Solving, Decreased Safety Awareness, Difficulty Following Commands, Impulsive, and Expressive Aphasia    RANGE OF MOTION:  Right Upper Extremity: WFL  Left Upper Extremity:  Impaired - no active movement noted  **No subluxation noted at L shoulder on this date.     STRENGTH:  Right Upper Extremity: WFL, grossly 5/5  Left Upper Extremity:  Impaired - 0/5    SENSATION:   Decreased sensation to light touch noted in L hemibody in addition to decreased proprioception/kinesthesia. ADL:   Grooming: Maximum Assistance. For washing face while seated at EOB. Mod-max cues for washing L side of face. BALANCE:  Sitting Balance: Moderate Assistance. Primarily while seated at EOB. Did have periods where required minimal assistance, although as fatigued required max assistance. Pt tolerated sitting at EOB with poor awareness of midline orientation, requiring max verbal and tactile cues to correct. Pt noted to be pushing to L when RUE placed in support on bedrail, although when RUE transferred to lap, pt demoed improved posture/decrease pushing to L. Max cues to scan to L hemibody/environment while seated at EOB. Shifts/repositions frequently while seated at EOB placing pt at significantly high risk of fall due to poor awareness of body/position. BED MOBILITY:  Rolling to Left: Maximum Assistance, X 1-2, with head of bed flat, with rail, with verbal cues , with increased time for completion   Rolling to Right: Maximum Assistance, X 1-2, with head of bed flat, with verbal cues , with increased time for completion    Supine to Sit: Maximum Assistance, X 1, with head of bed raised, with verbal cues , with increased time for completion . Pt able to move LLE to advance to side of bed  Sit to Supine: Maximum Assistance, X 2, with head of bed raised, with verbal cues , with increased time for completion ; noted to have increased extensor tone in LLE once returned to supine although subsided within ~15 seconds of light stretch  Scooting: Maximum Assistance ; advancing hips forward to EOB    TRANSFERS:  OTR to further assess as able/appropriate. Not appropriate to assess this date due to pt's impulsivity, poor awareness, and poor trunk control while seated. FUNCTIONAL MOBILITY:  OTR to further assess as able/appropriate    Activity Tolerance:  Patient tolerance of  treatment: fair. Assessment:  Assessment: Pt presents requiring increased assistance for ADLs, transfers, and functional mobility compared to PLOF. Pt will continue to benefit from OT services to improve independence with these tasks, in addition to overall strength/endurance to facilitate return to PLOF. Performance deficits / Impairments: Decreased functional mobility , Decreased ADL status, Decreased ROM, Decreased strength, Decreased safe awareness, Decreased cognition, Decreased endurance, Decreased sensation, Decreased balance, Decreased vision/visual deficit, Decreased high-level IADLs, Decreased fine motor control, Decreased coordination, Decreased posture  Prognosis: Fair  REQUIRES OT FOLLOW-UP: Yes  Decision Making: High Complexity    Treatment Initiated: Treatment and education initiated within context of evaluation. Evaluation time included review of current medical information, gathering information related to past medical, social and functional history, completion of standardized testing, formal and informal observation of tasks, assessment of data and development of plan of care and goals. Treatment time included skilled education and facilitation of tasks to increase safety and independence with ADL's for improved functional independence and quality of life.     Discharge Recommendations:  IP Rehab    Patient Education:     Patient Education  Education Given To: Patient, Family  Education Provided: Role of Therapy, Plan of Care (midline orientation, L hemibody awareness; scanning to L)  Education Method: Demonstration, Verbal  Barriers to Learning: Cognition  Education Outcome: Unable to demonstrate understanding, Continued education needed    Equipment Recommendations:  Equipment Needed: No  Other: defer to next level of care    Plan:  Times Per Week: 6x  Current Treatment Recommendations: Strengthening, ROM, Balance training, Functional mobility training, Endurance training, Neuromuscular re-education, Safety education & training, Patient/Caregiver education & training, Positioning, Self-Care / ADL. See long-term goal time frame for expected duration of plan of care. If no long-term goals established, a short length of stay is anticipated. Goals:     Short Term Goals  Time Frame for Short Term Goals: by discharge  Short Term Goal 1: Pt will tolerate sitting at EOB X15 minutes with consistent CGA and moderate cues for midline orientation and awareness to L hemibody/environment, in prep for ADL completion. Short Term Goal 2: Pt will complete grooming/UB ADL with moderate assistance and moderate cuing for awareness to L hemibody to increase independence with self care tasks. Short Term Goal 3: Pt will scan to L side of body/environment with moderate cuing to increase awareness as needed for ADL completion. Short Term Goal 4: Pt will tolerate further assessment of transfers/functional mobility by OTR when appropriate. Following session, patient left in safe position with all fall risk precautions in place.

## 2022-12-13 NOTE — PROGRESS NOTES
Stroke Folder given. What is Stroke/CVA  Signs and Symptoms of stroke (BEFAST)  Treatments for Stroke  Personal Risk Factors for Stroke discussed  Education--Call 911      Patient/family has been educated on their personal risk factors of:    Hypertension  Previous stroke    diabetes      They have been given hand outs on the following medications:( give handouts/attach to AVS)    statins(Specify) Lipitor      Treatment for stroke includes:  Risk factor modifications  Following the medication regime prescribed by physician      Educated on BE FAST-Face-Arm-Speech-Time    A stroke is a brain attack. Stroke is a brain injury. It occurs when the brain's blood supply is interrupted. Blood carries oxygen and nutrients to the brain. Without oxygen and nutrients from blood, brain tissue starts to die rapidly. This can happen in less than 10 minutes. A stroke occurs when blood flow to the brain is blocked (called ischemic stroke). This is caused by one of the following:   Sudden decreased blood flow   Damage to a blood vessel supplying blood to the brain can occur suddenly from either:   Injury   A clot that forms and breaks off from another part of the body (such as the heart or neck)   There are certain conditions which predispose people to form blood clots, such as:   Cancer   Pregnancy   Atrial fibrillation   Certain autoimmune diseases   Local blood clot   A build-up of fatty substances ( atherosclerotic plaque ) along the inner lining of the artery causes:   Narrowing of artery   Reduced elasticity   Local inflammation   Blood protein defects leading to increased clotting tendency   Decreased blood flow in the artery   Clot in an artery supplying the brain   Inflammatory conditions in the blood vessels (vasculitis)   A stroke may also occur if a blood vessel breaks and bleeds into or around the brain. This is called hemorrhagic stroke. This condition needs to be monitored closely.  Be sure to keep all appointments. Have exams and blood tests done as directed. Call 911 If Any of the Following Occurs   It is important that you and those around you know the warning signs for stroke. CALL 911 immediately if you have any of the following which may suggest a new stroke:   Sudden weakness or numbness of face, arm, or leg, especially on one side of the body   Sudden confusion   Sudden trouble speaking or understanding   Sudden trouble seeing in one or both eyes   Sudden dizziness, trouble walking, loss of balance, or coordination   Sudden severe headache with no known cause   If you think you have an emergency, CALL 911       To help reduce your risk of stroke, take the following steps:   Eat a well-balanced diet. The DASH diet rich in fruits, vegetables and low-fat dairy foods, and low in saturated fat, total fat, and cholesterolmay help keep your blood pressure in the healthy range. Exercise regularly. Maintain a healthy weight. (Your body mass index should be below 25.)   If you smoke, quit . Drink alcohol in moderation. Moderate is two or fewer drinks per day for men and one or fewer drinks per day for women and older adults. Control your diabetes    All patient/family questions were answered and teach back method was utilized.

## 2022-12-13 NOTE — CARE COORDINATION
Case Management Assessment  Initial Evaluation    Date/Time of Evaluation: 12/13/2022 12:56 PM  Assessment Completed by: Nakul Rutherford RN    If patient is discharged prior to next notation, then this note serves as note for discharge by case management. Patient Name: Bryon Hubbard                   YOB: 1960  Diagnosis: Acute CVA (cerebrovascular accident) [facility]) [I63.9]  Abnormal QT interval present on electrocardiogram [R94.31]  Cerebrovascular accident (CVA), unspecified mechanism ([facility] [address] [I63.9]                   Date / Time: 12/12/2022 10:42 AM  Location: [facility]     Patient Admission Status: Inpatient   Readmission Risk (Low < 19, Mod (19-27), High > 27): Readmission Risk Score: 15.9    Current PCP: Floyd Goldstein, DO  PCP verified by CM? Yes    Chart Reviewed: Yes      History Provided by: Patient  Patient Orientation: Alert and Oriented    Patient Cognition: Alert    Hospitalization in the last 30 days (Readmission):  No    If yes, Readmission Assessment in CM Navigator will be completed. Advance Directives:      Code Status: Full Code   Patient's Primary Decision Maker is: Legal Next of Kin      Discharge Planning:    Patient lives with: Other (Comment) (From SNF) Type of Home: [place]  Primary Care Giver: Self  Patient Support Systems include: Family Members   Current Financial resources: Medicaid, Medicare  Current community resources: ECF/Home Care (SNF)  Current services prior to admission: [facility] (Used a walker there)            Current DME:  uses walker at SNF            Type of Home Care services:  None (From SNF, getting rehab)    ADLS  Prior functional level: Assistance with the following:, Bathing, Dressing, Toileting, Other (see comment) (From SNF - there for rehab)  Current functional level: Assistance with the following:, Bathing, Dressing, Toileting    Family can provide assistance at DC:  Other (comment) (Plan return to SNF)  Would you like Case Management to discuss the discharge plan with any other family members/significant others, and if so, who? No  Plans to Return to Present Housing: Yes (Will require precert)  Other Identified Issues/Barriers to RETURNING to current housing: none  Potential Assistance needed at discharge: Reilly Donalduel            Potential DME:    Patient expects to discharge to: [facility]  for transportation at discharge:  See  note    Financial    Payor: Dax Dixon / Plan: Roger Ashing / Product Type: *No Product type* /     Does insurance require precert for SNF: Yes    Potential assistance Purchasing Medications: No  Meds-to-Beds request: Yes      [facility] [address] [id number] - [place], [place] - [address] [facility] [phone] [facility] [phone]  [address] [place] [address]  Phone: [phone] Fax: [phone]    [facility] [address]  [address] [facility] [address]  [address] [place] [address] [id number]  Phone: [phone] Fax: [phone]      Notes:    Factors facilitating achievement of predicted outcomes: Family support, Cooperative, and Pleasant    Barriers to discharge: Medical complications and Medication managment    Additional Case Management Notes: Presented to ED with limb ataxia, hemianopia, and dysarthria. Stroke alert with NIH 5. Neurology consulted. Received TNK. No plan for endovascular intervention at this time. Admitted to ICU. Plan for MBS. Echo ordered. NIH: 14. Oriented x3, not time. Slurred speech. Left weakness noted. Follows commands. Tmax 99.3. NSR. Sats 97% on 2L O2. SLP/PT/OT. Intensivist and Neurology following. Telemetry, neuro checks/NIHSS, external urinary catheter, SCDs. Cardene @ 5 mg/hr, norvasc, atenolol, lipitor, IV rocephin, SSI, lisinopril, prn zofran, prn IM phenergan. Received 2g Mag sulfate x1 yesterday. LA 3.2 - then 1.8, trop neg, LDL 71, hgbA1C 7.3. Urine +nitrates and small leukocytes. Procedure:   12/12 CT Head: No acute findings; Stable appearing old infarcts in the left frontal lobe, both occipital lobes and a portion of the left parietal lobe; Small old infarct in the medial aspect of the right basal ganglia  12/12 CTA Head/Neck: Occluded cervical left internal carotid artery. There is an abrupt cut off. This may be an acute occlusion. This cavernous sinus segment is diminutive; Occluded right internal carotid artery. There is a normal cavernous sinus segment; Absent A1 and A2 segments of the left anterior cerebral artery; Patent but small left middle cerebral artery and proximal branches; Possible occlusion of the right subclavian artery at its origin. This is not well seen. The proximal right vertebral artery is not seen. This may also be occluded  12/13 MRI Brain: Moderate-sized acute infarct involving portions of the right frontoparietal junction, the posterior right frontal lobe, the posterior right temporal lobe and the anterior inferior right occipital lobe; Subacute infarct involving the superior right occipital lobe. There is some associated laminar necrosis; Old infarcts in the left frontal and parietal lobes  12/13 CT Head: Developing subacute right parietotemporal infarct    The Plan for Transition of Care is related to the following treatment goals of Acute CVA (cerebrovascular accident) ([address] [I63.9]  Abnormal QT interval present on electrocardiogram [R94.31]  Cerebrovascular accident (CVA), unspecified mechanism ([address] [I63.9]    Patient Goals/Plan/Treatment Preferences: From [facility]; plan to return. Will require precert. SW consulted. Transportation/Food Security/Housekeeping Addressed: No issues identified.      Navdeep Arredondo RN  Case Management Department

## 2022-12-13 NOTE — PROGRESS NOTES
Patient transferred to [facility] Room 07 from . Complaint upon arrival to the room: None  IV site free of s/s of infection or infiltration. Vital signs obtained. Assessment and data collection initiated. Oriented to room. Policies and procedures for 4A explained. All questions answered with no further questions at this time. Fall prevention and safety brochure discussed with patient. 2 person skin check completed.

## 2022-12-13 NOTE — PROGRESS NOTES
Hospitalist Progress Note    Patient: Caterina Ford  Unit/Bed: 4D-05/005-A  YOB: 1960  MRN: [id number]  Acct: [de-identified]    PCP: Enzo Jimenez DO    Date of Admission: 12/12/2022    Assessment/Plan:    Ischemic stroke - Hx of prior stroke with residual left weakness. NIH on arrival was 6. CT head (-), CTA head/neck (+) for bilateral ICA occlusions. TNK given on 12/12/22 at 1108 and admitted to ICU for 24 hour monitoring. MRI wo contrast on 12/13/22   - Continue Cardene for BP control with goal -180mmHg; can transition to oral anti-hypertensives today  - Continue high dose statin  - Awaiting MBS for diet orders; remain NPO until complete  - Neurology following and will resume ASA 325mg and Plavix 75mg when appropriate    ? UTI - Patient has (+) bacteria but cannot express symptoms  - Continue Ceftriaxone for 5 days  - Follow cultures    Acute hypoxic respiratory failure - Requiring 2LPM via NC. Uncertain etiology as there is no evidence of pneumonia on CXR but there is occasional poor inspiratory effort.  - Wean supplemental oxygen as tolerated to maintain SpO2>90%    NIDDM2 - Uncontrolled. A1c=7.3% (12/13/22). Home medications include Metformin  - Hold metformin  - Low dose SSI, POCT glucose, hypoglycemia protocol  - IP BG gaol 140-180    Dysarthria/Dysphagia - secondary to CVA  - Planned for MBS on 12/14/22    Primary HTN - Noted.  Home regimen of Atenolol and Amlodipine-Benzapril  - management as above    Depression/Anxiety - Resume Buspar, Lexapro, and Atarax after MBS study    HLD - High dose statin as above  GERD - Hold Prevacid until MBS completed  Obesity - BMI 34.76      Expected discharge date:  TBD    Disposition:    [] Home       [] TCU       [x] Rehab       [] Psych       [] SNF       [] [place]       [] Other -    Chief Complaint: Stroke alert    Hospital Course:    Caterina Ford is a 65 y.o. female everyday smoker with a PMHx of NIDDM 2, HLD, HTN, GERD, depression, anxiety, asthma, and prior CVA in 2010 with residual left-sided weakness and dysarthria. She presented to [facility] emergency department as a stroke alert with a last known well time on 12/12/2022 at 9:25 AM. She presented with an NIH score of 6. CT of the head was negative for acute intracranial abnormalities. CTA of the head/neck revealed bilateral ICA occlusions. The patient was consulted by neuro interventional and was subsequently administered TNK. She was admitted to the ICU for further observation post administration. Of note the patient had recently been evaluated here at [facility] on 11/22/2022 with complaint of dizziness. CT head at that time without acute findings and the patient was discharged home. She later presented to [facility] on 11/24/2022 for similar complaints and transferred to [facility]. At this time she was evaluated by interventional neurology where she was admitted from 11/24/22-12/2/2022 for similar symptoms. MRI brain at that time had evidence of large acute to subacute stroke of watershed regions and right occipital lobe. Diagnostic cerebral angiogram completed on 11/28/2022 revealed complete occlusion of the right ICA, left ICA, and right PCA. There was no stent intervention at that time. Patient's brother provided majority of the patient's history and stated that this started all before Thanksgiving of this year. He stated that she had prior strokes but with minimal deficit except some cognitive impairment and was living independently prior to Thanksgiving. The patient was discharged to SNF with ASA, Plavix, high intensity statin. Subjective (past 24 hours): Per nursing patient is very frustrated and appears anxious. The patient was unable to provide any history and exhibit left south-neglect, dysarthria, left hemianopia. NIHSS=13 on exam      Review of Systems: 12 point review of systems completed and pertinent positives are noted in the HPI.  All other systems reviewed and negative. Medications:  Reviewed    Infusion Medications    niCARdipine      dextrose      sodium chloride       Scheduled Medications    cefTRIAXone (ROCEPHIN) IV  1,000 mg IntraVENous Q24H    atenolol  25 mg Oral Daily    amLODIPine  10 mg Oral Daily    lisinopril  20 mg Oral Daily    atorvastatin  80 mg Oral Nightly    insulin lispro  0-4 Units SubCUTAneous TID WC    insulin lispro  0-4 Units SubCUTAneous Nightly    sodium chloride flush  5-40 mL IntraVENous 2 times per day     PRN Meds: glucose, dextrose bolus **OR** dextrose bolus, glucagon (rDNA), dextrose, sodium chloride flush, sodium chloride, ondansetron **OR** ondansetron, polyethylene glycol, acetaminophen **OR** acetaminophen, promethazine      Intake/Output Summary (Last 24 hours) at 12/13/2022 1356  Last data filed at 12/13/2022 1236  Gross per 24 hour   Intake 805.97 ml   Output --   Net 805.97 ml       Diet:  Diet NPO    Exam:  BP (!) 184/62   Pulse 72   Temp 99.3 °F (37.4 °C) (Oral)   Resp 17   Wt 215 lb 2.7 oz (97.6 kg)   SpO2 97%   BMI 34.73 kg/m²     Physical exam:  General Appearance: Well developed, well nourished, ill-appearing  Head: normocephalic atraumatic. Eyes: PERRL, EOMI. Fundi normal, left hemianopsia  Nose: No nasal discharge. Throat: Oral cavity and pharynx normal. No inflammation, swelling, exudate, or lesions. Teeth and gingiva in good general condition. Neck: Neck supple, non-tender without lymphadenopathy, masses or thyromegaly. Cardiac: Normal S1 and S2. No S3, S4 or murmurs. Rhythm is regular. There is no peripheral edema, cyanosis or pallor. Extremities are warm and well perfused. Capillary refill is less than 2 seconds. No carotid bruits. Lungs: Pulmonary effort is reduced. Clear to auscultation and percussion without rales, rhonchi, wheezing or diminished breath sounds. Abdomen: Positive bowel sounds. Soft, nondistended, nontender. No guarding or rebound. No masses.   MSK: Normal range of motion, normal muscular development, no edema. Extremities: No significant deformity or joint abnormality. No edema. Peripheral pulses intact. No varicosities. Skin: Skin normal color, texture and turgor with no lesions or eruptions. Neurological:   CRANIAL NERVES:  II: Pupils equal and reactive, no RAPD, left visual field deficit (lower temporal and upper temporal quadrants)  III, IV, VI: EOM intact, no gaze preference or deviation, no nystagmus. V: normal sensation in V1, V2, and V3 segments bilaterally  VII: no asymmetry, no nasolabial fold flattening  VIII: normal hearing to speech  IX, X: normal palatal elevation, no uvular deviation  XI: 5/5 head turn and 5/5 shoulder shrug on right, 3/5 shoulder shrug on left  XII: midline tongue protrusion    MOTOR:  Muscle tone 5/5 in the RUE and RLE  LUE no movement  LLE 3/5 with drift and unable to resist.    SENSORY:  Decreased sensation to light touch of the left upper and lower extremities    COORD:  Unable to assess    STATION:  Unable to assess    GAIT:  Unable to assess        Labs:  Recent Labs     12/12/22  1125 12/13/22  0432   WBC 8.3 9.0   HGB 13.6 12.3   HCT 42.7 38.5    240     Recent Labs     12/12/22  1125 12/12/22  1312 12/13/22  0432    141 142   K 3.6 4.0 3.6   CL 99 100 100   CO2 26 27 32   BUN 9 10 8   CREATININE 0.6 0.6 0.6   CALCIUM 10.0 9.8 9.4     Recent Labs     12/12/22  1125   AST 18   ALT 17   BILITOT 0.4   ALKPHOS 59     Recent Labs     12/12/22  1125 12/12/22  1312   INR 0.96 0.98     No results for input(s): CKTOTAL, TROPONINI in the last 72 hours.     Microbiology:    Urinalysis:   Lab Results   Component Value Date/Time    NITRU POSITIVE 12/13/2022 01:50 AM    WBCUA 15-25 12/13/2022 01:50 AM    BACTERIA MANY 12/13/2022 01:50 AM    RBCUA 0-2 12/13/2022 01:50 AM    BLOODU TRACE 12/13/2022 01:50 AM    SPECGRAV 1.022 10/17/2019 11:45 AM    SPECGRAV >=1.030 10/17/2019 10:50 AM    GLUCOSEU NEGATIVE 12/13/2022 01:50 AM Radiology:  MRI brain without contrast   Final Result       1. Moderate-sized acute infarct involving portions of the right frontoparietal junction, the posterior right frontal lobe, the posterior right temporal lobe and the anterior inferior right occipital lobe. 2. Subacute infarct involving the superior right occipital lobe. There is some associated laminar necrosis. 3. Old infarcts in the left frontal and parietal lobes. **This report has been created using voice recognition software. It may contain minor errors which are inherent in voice recognition technology. **      Final report electronically signed by Dr. Rosa Guerra on 12/13/2022 12:02 PM      CT HEAD WO CONTRAST   Final Result   Impression:   1. Developing subacute right parietotemporal infarct. This document has been electronically signed by: Saúl Alvarez MD on 12/13/2022 01:37 AM      All CTs at this facility use dose modulation techniques and iterative    reconstructions, and/or weight-based dosing   when appropriate to reduce radiation to a low as reasonably achievable. XR CHEST PORTABLE   Final Result   1. Borderline cardiomegaly. 2. Atelectasis or scarring at left lung base. 3. Otherwise negative chest x-ray. .               **This report has been created using voice recognition software. It may contain minor errors which are inherent in voice recognition technology. **      Final report electronically signed by DR Mary Velasquez on 12/12/2022 12:37 PM      CTA HEAD W WO CONTRAST (CODE STROKE)   Final Result       1. Occluded cervical left internal carotid artery. There is an abrupt cut off. This may be an acute occlusion. This cavernous sinus segment is diminutive. 2. Occluded right internal carotid artery. There is a normal cavernous sinus segment. 3. Absent A1 and A2 segments of the left anterior cerebral artery. 4. Patent but small left middle cerebral artery and proximal branches.    5. Possible occlusion of the right subclavian artery at its origin. This is not well seen. The proximal right vertebral artery is not seen. This may also be occluded. These findings were telephoned to Jax Rodriguez MD at 11:48 AM on 12/12/2022. Author Ericka **This report has been created using voice recognition software. It may contain minor errors which are inherent in voice recognition technology. **      Final report electronically signed by Dr. Shayla Duke on 12/12/2022 11:50 AM      CTA NECK W WO CONTRAST   Final Result       1. Occluded cervical left internal carotid artery. There is an abrupt cut off. This may be an acute occlusion. This cavernous sinus segment is diminutive. 2. Occluded right internal carotid artery. There is a normal cavernous sinus segment. 3. Absent A1 and A2 segments of the left anterior cerebral artery. 4. Patent but small left middle cerebral artery and proximal branches. 5. Possible occlusion of the right subclavian artery at its origin. This is not well seen. The proximal right vertebral artery is not seen. This may also be occluded. These findings were telephoned to Jax Rodriguez MD at 11:48 AM on 12/12/2022. Author Ericka **This report has been created using voice recognition software. It may contain minor errors which are inherent in voice recognition technology. **      Final report electronically signed by Dr. Shayla Duke on 12/12/2022 11:50 AM      CT HEAD WO CONTRAST   Final Result       1. No acute findings. 2. Stable appearing old infarcts in the left frontal lobe, both occipital lobes and a portion of the left parietal lobe. 3. Small old infarct in the medial aspect of the right basal ganglia. These findings were telephoned to James Will at 11:02 AM on 12/12/2022 . **This report has been created using voice recognition software. It may contain minor errors which are inherent in voice recognition technology. **      Final report electronically signed by Dr. Robert Baires on 12/12/2022 11:06 AM      FL MODIFIED BARIUM SWALLOW W VIDEO    (Results Pending)       DVT prophylaxis:  [] Lovenox  [x] SCDs  [] SQ Heparin  [] Encourage ambulation  [] Already on anticoagulation  [] Other -      Code Status: Full Code    PT/OT Evaluation Status: Ordered    Telemetry:  [x] Yes  [] No    Electronically signed by Gely Ellison.  DO Randall MBA on 12/13/2022 at 1:56 PM

## 2022-12-13 NOTE — DISCHARGE INSTR - COC
Dyslipidemia E78.5    Acute CVA (cerebrovascular accident) ([facility] [address] I63.9       Isolation/Infection:   Isolation            No Isolation          Patient Infection Status       Infection Onset Added Last Indicated Last Indicated By Review Planned Expiration Resolved Resolved By    None active    Resolved    COVID-19 (Rule Out) 22 COVID-19 & Influenza Combo (Ordered)   22             Nurse Assessment:  Last Vital Signs: BP (!) 184/62   Pulse 72   Temp 99.3 °F (37.4 °C) (Oral)   Resp 17   Wt 215 lb 2.7 oz (97.6 kg)   SpO2 97%   BMI 34.73 kg/m²     Last documented pain score (0-10 scale): Pain Level: 3  Last Weight:   Wt Readings from Last 1 Encounters:   22 215 lb 2.7 oz (97.6 kg)     Mental Status:  oriented    IV Access:  - None    Nursing Mobility/ADLs:  Walking   Dependent  Transfer  Dependent  Bathing  Dependent  Dressing  Dependent  Toileting  Dependent  Feeding  Dependent  Med Admin  Dependent  Med Delivery   crushed in applesauce    Wound Care Documentation and Therapy:  Wound 07/15/12 Laceration Toe (Comment  which one) Right (Active)   Number of days: 3803        Elimination:  Continence: Bowel: No  Bladder: No  Urinary Catheter: None   Colostomy/Ileostomy/Ileal Conduit: No       Date of Last BM: 22    Intake/Output Summary (Last 24 hours) at 2022 1341  Last data filed at 2022 1236  Gross per 24 hour   Intake 805.97 ml   Output --   Net 805.97 ml     I/O last 3 completed shifts:   In: 591.2 [I.V.:545.6; IV Piggyback:45.6]  Out: -     Safety Concerns:     Aspiration Risk    Impairments/Disabilities:      Speech    Nutrition Therapy:  Current Nutrition Therapy:   - Oral Diet:  Dysphagia 1 pureed    Routes of Feeding: Oral  Liquids: Nectar Thick Liquids  Daily Fluid Restriction: no  Last Modified Barium Swallow with Video (Video Swallowing Test): done on     Treatments at the Time of Hospital Discharge:   Respiratory Treatments:   Oxygen Therapy:  is not on home oxygen therapy. Ventilator:    - No ventilator support    Rehab Therapies: Physical Therapy and Occupational Therapy  Weight Bearing Status/Restrictions: No weight bearing restrictions  Other Medical Equipment (for information only, NOT a DME order):  wheelchair  Other Treatments:     Patient's personal belongings (please select all that are sent with patient):  None    RN SIGNATURE:  Electronically signed by Roseline Mccain RN on 12/17/22 at 12:26 PM EST    CASE MANAGEMENT/SOCIAL WORK SECTION    Inpatient Status Date: 12/12/2022    Readmission Risk Assessment Score:  Readmission Risk              Risk of Unplanned Readmission:  12           Discharging to Facility/ Agency   Name: [address] [place], [address]  [address]  Fax:[phone]    Dialysis Facility (if applicable)   Name:  Address:  Dialysis Schedule:  Phone:  Fax:    / signature: Electronically signed by NANCY Harris on 12/13/22 at 1:43 PM EST    PHYSICIAN SECTION    Prognosis: {Prognosis:8099730706}    Condition at Discharge: [facility] Patient Condition:[id number]}    Rehab Potential (if transferring to Rehab): {Prognosis:4522684907}    Recommended Labs or Other Treatments After Discharge: ***    Physician Certification: I certify the above information and transfer of Jonatan Guzmán  is necessary for the continuing treatment of the diagnosis listed and that she requires {Admit to Appropriate Level of Care:04768} for {GREATER/LESS:169874526} 30 days.      Update Admission H&P: {CHP DME Changes in XMWEL:998712839}    PHYSICIAN SIGNATURE:  {Esignature:[id number]}

## 2022-12-13 NOTE — PROGRESS NOTES
weakness, dysarthria  INR in ED if anticoagulated:  not applicable. Initial blood pressure:  166/127, 176/87 prior to TNK administration  Initial NIHSS: 6  Pre-morbid Modified Nicholas Scale:  3  Time of initial imaging read:  1102  Thrombolytic administered:  1108  Thrombectomy performed:  not indicated. Puncture time:  not applicable. Upon further evaluation in the ED following stroke alert, patient alert and oriented. Actively moving LLE anti-gravity, but not LUE. She follows some commands, but I was again unable to assess coordination, as patient did not participate in this part of the exam. She continues to complain of nausea and continues to dry heave and cough. BP during repeat examination 155/75 around 1140. At this time, EMOMs intact in all visual fields without gaze deviation. Care everywhere checked - patient noted to have been evaluated by interventional neurology in [facility] where she was admitted from 11/24-12/2/22 for similar symptoms and MRI brain evidence of large acute to subacute stroke of watershed regions and R occipital lobe. Diagnostic cerebral angiogram completed 11/28/2022 in [place] and revealed complete occlusions of R ICA, L ICA, and R PCA. The decision was made against stent placement at that time. Interval History 12/13/22:  Pt states that she has a headache which is improved with Tylenol and continues with LUE weakness and sensory change. She did have a CTH overnight due to being difficult to arouse. CT was negative for ICH but did show developing stroke. Review of Systems   Review of Systems   Constitutional:  Negative for chills and fever. HENT:  Negative for rhinorrhea and sore throat. Eyes:  Positive for visual disturbance. Negative for photophobia. Respiratory:  Positive for cough. Negative for shortness of breath. Cardiovascular:  Negative for chest pain and palpitations. Gastrointestinal:  Positive for nausea and vomiting.  Negative for abdominal pain.   Genitourinary:  Negative for dysuria. Musculoskeletal:  Negative for arthralgias and myalgias. Skin:  Negative for rash. Neurological:  Positive for speech difficulty, weakness and numbness. Negative for dizziness, tremors, seizures, facial asymmetry and headaches. Psychiatric/Behavioral:  Negative for confusion and decreased concentration. The patient is not nervous/anxious. Medications   Scheduled Meds:    cefTRIAXone (ROCEPHIN) IV  1,000 mg IntraVENous Q24H    atorvastatin  80 mg Oral Nightly    insulin lispro  0-4 Units SubCUTAneous TID WC    insulin lispro  0-4 Units SubCUTAneous Nightly    sodium chloride flush  5-40 mL IntraVENous 2 times per day     Continuous Infusions:    niCARdipine 5 mg/hr (12/13/22 0858)    dextrose      sodium chloride       PRN Meds:   Medications Prior to Admission:   No current facility-administered medications on file prior to encounter. Current Outpatient Medications on File Prior to Encounter   Medication Sig Dispense Refill    gabapentin (NEURONTIN) 100 MG capsule take 1 capsule by mouth three times a day 90 capsule 2    tiZANidine (ZANAFLEX) 4 MG tablet take 1 tablet by mouth every 8 hours if needed for muscle spasm 90 tablet 2    metFORMIN (GLUCOPHAGE) 1000 MG tablet take 1 tablet by mouth twice a day with meals 180 tablet 3    busPIRone (BUSPAR) 15 MG tablet TAKE ONE TABLET BY MOUTH TWICE DAILY @ 9AM & 5PM 180 tablet 3    atorvastatin (LIPITOR) 40 MG tablet take 1 tablet by mouth once daily 90 tablet 3    atenolol (TENORMIN) 25 MG tablet Take 1 tablet by mouth daily 90 tablet 3    oxybutynin (DITROPAN-XL) 10 MG extended release tablet TAKE 1 TABLET BY MOUTH ONCE DAILY 90 tablet 3    amLODIPine-benazepril (LOTREL) 10-20 MG per capsule TAKE 1 CAPSULE BY MOUTH ONCE DAILY 90 capsule 3    escitalopram (LEXAPRO) 10 MG tablet Take 1 tablet by mouth daily 90 tablet 3    Misc.  Devices (WALKER) MISC Wheeled walker with a seat (Rollator) 1 each 0    clopidogrel (PLAVIX) 75 MG tablet TAKE 1 TABLET BY MOUTH ONCE DAILY 90 tablet 3    albuterol sulfate HFA (PROAIR HFA) 108 (90 Base) MCG/ACT inhaler Inhale 2 puffs into the lungs every 4 hours as needed for Wheezing or Shortness of Breath 2 Inhaler 3    fluticasone (FLOVENT HFA) 110 MCG/ACT inhaler Inhale 1 puff into the lungs 2 times daily 3 Inhaler 3    omega-3 acid ethyl esters (LOVAZA) 1 g capsule       Blood Glucose Monitoring Suppl KIT Use As Directed 1 kit 0    blood glucose monitor strips Use to check sugars twice daily 300 strip 3    Lancets MISC Use to check sugars twice daily 300 each 3    hydrOXYzine (ATARAX) 25 MG tablet Take 1 tablet by mouth nightly as needed for Anxiety 90 tablet 2    lansoprazole (PREVACID SOLUTAB) 30 MG disintegrating tablet Take 30 mg by mouth daily      vitamin D (CHOLECALCIFEROL) 1000 UNITS TABS tablet Take 2,000 Units by mouth daily       aspirin 325 MG tablet Take 325 mg by mouth daily. Past History    Past Medical History:   has a past medical history of Asthma, mild persistent, Depression with anxiety, DM2 (diabetes mellitus, type 2) ([facility] [address] Dyslipidemia, GERD (gastroesophageal reflux disease), Glaucoma, History of CVA (cerebrovascular accident), Hypertension, essential, Microalbuminuria due to type 2 diabetes mellitus ([facility] [address] Nicotine dependence, Umbilical hernia, and Urge urinary incontinence. Social History:   reports that she has been smoking. She has a 33.00 pack-year smoking history. She has never used smokeless tobacco. She reports that she does not drink alcohol and does not use drugs.      Family History:   Family History   Problem Relation Age of Onset    Diabetes Mother     Glaucoma Mother     Heart Attack Father     Diabetes Sister     Breast Cancer Sister     Diabetes Brother     Colon Polyps Brother 52    Colon Polyps Sister 54    Colon Cancer Neg Hx        Physical Examination        Vitals:  BP (!) 184/62   Pulse 72   Temp 99.3 °F (37.4 °C) (Oral)   Resp 17 Wt 215 lb 2.7 oz (97.6 kg)   SpO2 97%   BMI 34.73 kg/m²   Temp (24hrs), Av °F (37.2 °C), Min:98.3 °F (36.8 °C), Max:99.5 °F (37.5 °C)      I/O (24Hr): Intake/Output Summary (Last 24 hours) at 2022 0937  Last data filed at 2022 0858  Gross per 24 hour   Intake 641.42 ml   Output --   Net 641.42 ml         Physical Exam  Vitals reviewed. Constitutional:       General: She is not in acute distress. Appearance: She is ill-appearing. HENT:      Head: Normocephalic and atraumatic. Right Ear: External ear normal.      Left Ear: External ear normal.      Nose: Nose normal.      Mouth/Throat:      Mouth: Mucous membranes are dry. Pharynx: Oropharynx is clear. No oropharyngeal exudate or posterior oropharyngeal erythema. Eyes:      Extraocular Movements: EOM normal.      Pupils: Pupils are equal, round, and reactive to light. Comments: Initially would not cross midline to the left visual field, resolved upon repeat examination   Pulmonary:      Effort: Pulmonary effort is normal. No respiratory distress. Musculoskeletal:         General: Normal range of motion. Cervical back: Normal range of motion. Right lower leg: No edema. Left lower leg: No edema. Skin:     General: Skin is warm and dry. Findings: No rash. Neurological:      Mental Status: She is alert and oriented to person, place, and time. Comments: Intermittently following commands. Alert and oriented to self, month, year. Psychiatric:         Mood and Affect: Mood normal.         Speech: Speech is slurred. Behavior: Behavior normal.     Neurologic Exam     Mental Status   Oriented to person, place, and time. Oriented to person. Oriented to place. Oriented to time. Follows commands: intermittently follows commands. Attention: normal. Concentration: normal.   Speech: slurred   Level of consciousness: alert  Able to name object. Able to repeat.      Cranial Nerves     CN II   Left visual field deficit: lower temporal and upper temporal quadrant(s)    CN III, IV, VI   Pupils are equal, round, and reactive to light. Extraocular motions are normal.     CN V   Facial sensation intact. Right facial sensation deficit: none  Left facial sensation deficit: none    CN VII   Facial expression full, symmetric. Right facial weakness: none  Left facial weakness: none    CN VIII   CN VIII normal.   Hearing: intact    CN IX, X   CN IX normal.   CN X normal.   Palate: symmetric    CN XI   Right sternocleidomastoid strength: normal  Left sternocleidomastoid strength: normal  Right trapezius strength: normal  Left trapezius strength: weak    CN XII   CN XII normal.   Tongue: not atrophic  Fasciculations: absent  Tongue deviation: none  Pt unable to follow directions for visual field testing, appears to have left temporal visual field deficit. Motor Exam   Muscle bulk: normal  Overall muscle tone: normalRUE/RLE 5/5    LUE no movement  LLE 3/5, antigravity with drift , unable to resist pressure     Sensory Exam     Decreased sensation to light touch left upper and lower extremities. Gait, Coordination, and Reflexes   BARTOLOME coordination, no adventitious motor movements noted on exam. No tremors.        Labs/Imaging/Diagnostics   Labs:  CBC:  Recent Labs     12/12/22  1125 12/13/22  0432   WBC 8.3 9.0   RBC 4.60 4.12*   HGB 13.6 12.3   HCT 42.7 38.5   MCV 92.8 93.4    240       CHEMISTRIES:  Recent Labs     12/12/22  1125 12/12/22  1312 12/13/22  0432    141 142   K 3.6 4.0 3.6   CL 99 100 100   CO2 26 27 32   BUN 9 10 8   CREATININE 0.6 0.6 0.6   GLUCOSE 132* 134* 122*       COAGULATION STUDIES:  Recent Labs     12/12/22  1125 12/12/22  1312   INR 0.96 0.98   APTT 30.5 31.3       LIVER PROFILE:  Recent Labs     12/12/22  1125   AST 18   ALT 17   BILITOT 0.4   ALKPHOS 59       CHOLESTEROL AND A1C:  Recent Labs     12/13/22  0432   LDLCALC 71   HDL 35   CHOL 136   TRIG 149 LABA1C 7.3*        Imaging Last 24 Hours:  CTA HEAD W WO CONTRAST (CODE STROKE)    Result Date: 12/12/2022  PROCEDURE: CTA HEAD W WO CONTRAST, CTA NECK W WO CONTRAST CLINICAL INFORMATION: STROKE ALERT. Right-sided weakness. Old infarcts on the left. COMPARISON: Head CT 12/12/2022 and 11/22/2022. TECHNIQUE: 1 mm axial images were obtained through the head and neck after the fast bolus administration of contrast. A noncontrast localizer was obtained. 3-D reconstructions were performed on a dedicated 3-D workstation. These include multiplanar MPR images and multiplanar MIP images. Centerline reconstructions were obtained of the carotid systems. Isovue intravenous contrast was given. The [employer] application was used in analyzing the CTA head. An image was sent to PACS. All CT scans at this facility use dose modulation, iterative reconstruction, and/or weight-based dosing when appropriate to reduce radiation dose to as low as reasonably achievable. FINDINGS: CTA NECK: Aortic arch and branches: There is calcified atheromatosis of the aortic arch. There is no stenosis at the origin innominate artery, left common carotid artery or the left subclavian artery. There is a possible proximal occlusion of the right subclavian artery. This is obscured by artifact from incoming contrast. Right common carotid artery/ICA: There is some calcified atherosclerosis of the right common carotid artery. There is heavy atherosclerosis of the right carotid bulb. The origin of the right external carotid artery is patent. The right internal carotid artery is occluded throughout its cervical segment. Left common carotid artery/ICA: The left common carotid artery is normal. The origin of the left external carotid artery is affected by atherosclerosis. There is no gross stenosis. The proximal left carotid bulb is patent. There is been an abrupt cut off. The distal left carotid bulb is occluded. The left internal carotid artery is occluded. Vertebral arteries: The right vertebral artery is small. This is difficult to follow due to artifact from incoming contrast. The proximal segment is not seen. Distally this is a very small vessel. The left vertebral artery is the dominant vessel. This is  normal throughout. CTA HEAD: Internal carotid arteries: The right internal carotid artery reconstitutes at the level skull base. This is due to collateral flow. There is scattered calcified atheromatosis. There is no stenosis of the cavernous segment. The ophthalmic artery origin is  patent. The left internal carotid artery is diminutive. This is a small vessel there is severe stenosis in its cavernous segment. The ophthalmic artery on the left is patent. Middle cerebral arteries: On the right the MCA and its proximal branches are normal. On the left, the MCA is a small vessel. The M1 and M2 branches are patent. Anterior cerebral arteries: There is a normal right A1 segment of the anterior cerebral artery. A left A1 segment is not identified. Only a single A2 segment is identified. Vertebral arteries: The distal right vertebral artery is diminutive. There is a normal dominant left vertebral artery. Basilar artery: Normal. Superior cerebellar arteries: Normal. Posterior cerebral arteries: There is a normal large left posterior cerebral artery. There is a normal left posterior communicating artery. There is a small right posterior communicating artery. There is a aplastic right P1 segment. No aneurysms are identified. The superior sagittal sinus, vein of Paresh, internal cerebral veins, straight sinus, transverse sinuses and sigmoid sinuses are patent. Axial source data: There are no suspicious findings the lung apices. There is no upper mediastinal adenopathy. There is a small vague nodule in the right thyroid lobe measuring 1 cm. There is no cervical adenopathy. In the brain, there are old infarcts in both occipital lobes and the left frontal and parietal lobes. 1. Occluded cervical left internal carotid artery. There is an abrupt cut off. This may be an acute occlusion. This cavernous sinus segment is diminutive. 2. Occluded right internal carotid artery. There is a normal cavernous sinus segment. 3. Absent A1 and A2 segments of the left anterior cerebral artery. 4. Patent but small left middle cerebral artery and proximal branches. 5. Possible occlusion of the right subclavian artery at its origin. This is not well seen. The proximal right vertebral artery is not seen. This may also be occluded. These findings were telephoned to Kacey Eden MD at 11:48 AM on 12/12/2022. Romel Kent **This report has been created using voice recognition software. It may contain minor errors which are inherent in voice recognition technology. ** Final report electronically signed by Dr. Kyrie Villareal on 12/12/2022 11:50 AM    CT HEAD WO CONTRAST    Result Date: 12/12/2022  PROCEDURE: CT HEAD WO CONTRAST CLINICAL INFORMATION: [address]. Right-sided weakness. COMPARISON: Head CT 11/22/2022. TECHNIQUE: Noncontrast 5 mm axial images were obtained through the brain. Sagittal and coronal reconstructions were obtained. All CT scans at this facility use dose modulation, iterative reconstruction, and/or weight-based dosing when appropriate to reduce radiation dose to as low as reasonably achievable. FINDINGS: There is no hemorrhage. There are no intra-or extra-axial collections. There is no hydrocephalus, midline shift or mass effect. There are stable old infarcts in the left frontal lobe and the left parieto-occipital lobe. There is also an old appearing infarct in the right occipital lobe. There is an old infarct in the right medial basal ganglia. There is a moderate amount of abnormal low attenuation in the white matter the brain suggesting chronic small vessel ischemic changes. The paranasal sinuses and mastoid air cells are normally aerated. There is no suspicious calvarial abnormality. 1. No acute findings. 2. Stable appearing old infarcts in the left frontal lobe, both occipital lobes and a portion of the left parietal lobe. 3. Small old infarct in the medial aspect of the right basal ganglia. These findings were telephoned to Gaviota Scanlon at 11:02 AM on 12/12/2022 . **This report has been created using voice recognition software. It may contain minor errors which are inherent in voice recognition technology. ** Final report electronically signed by Dr. Rosa Guerra on 12/12/2022 11:06 AM    CTA NECK W WO CONTRAST    Result Date: 12/12/2022  PROCEDURE: CTA HEAD W WO CONTRAST, CTA NECK W WO CONTRAST CLINICAL INFORMATION: [address]. Right-sided weakness. Old infarcts on the left. COMPARISON: Head CT 12/12/2022 and 11/22/2022. TECHNIQUE: 1 mm axial images were obtained through the head and neck after the fast bolus administration of contrast. A noncontrast localizer was obtained. 3-D reconstructions were performed on a dedicated 3-D workstation. These include multiplanar MPR images and multiplanar MIP images. Centerline reconstructions were obtained of the carotid systems. Isovue intravenous contrast was given. The [employer] application was used in analyzing the CTA head. An image was sent to PACS. All CT scans at this facility use dose modulation, iterative reconstruction, and/or weight-based dosing when appropriate to reduce radiation dose to as low as reasonably achievable. FINDINGS: CTA NECK: Aortic arch and branches: There is calcified atheromatosis of the aortic arch. There is no stenosis at the origin innominate artery, left common carotid artery or the left subclavian artery. There is a possible proximal occlusion of the right subclavian artery. This is obscured by artifact from incoming contrast. Right common carotid artery/ICA: There is some calcified atherosclerosis of the right common carotid artery. There is heavy atherosclerosis of the right carotid bulb.  The origin of the right external carotid artery is patent. The right internal carotid artery is occluded throughout its cervical segment. Left common carotid artery/ICA: The left common carotid artery is normal. The origin of the left external carotid artery is affected by atherosclerosis. There is no gross stenosis. The proximal left carotid bulb is patent. There is been an abrupt cut off. The distal left carotid bulb is occluded. The left internal carotid artery is occluded. Vertebral arteries: The right vertebral artery is small. This is difficult to follow due to artifact from incoming contrast. The proximal segment is not seen. Distally this is a very small vessel. The left vertebral artery is the dominant vessel. This is  normal throughout. CTA HEAD: Internal carotid arteries: The right internal carotid artery reconstitutes at the level skull base. This is due to collateral flow. There is scattered calcified atheromatosis. There is no stenosis of the cavernous segment. The ophthalmic artery origin is  patent. The left internal carotid artery is diminutive. This is a small vessel there is severe stenosis in its cavernous segment. The ophthalmic artery on the left is patent. Middle cerebral arteries: On the right the MCA and its proximal branches are normal. On the left, the MCA is a small vessel. The M1 and M2 branches are patent. Anterior cerebral arteries: There is a normal right A1 segment of the anterior cerebral artery. A left A1 segment is not identified. Only a single A2 segment is identified. Vertebral arteries: The distal right vertebral artery is diminutive. There is a normal dominant left vertebral artery. Basilar artery: Normal. Superior cerebellar arteries: Normal. Posterior cerebral arteries: There is a normal large left posterior cerebral artery. There is a normal left posterior communicating artery. There is a small right posterior communicating artery. There is a aplastic right P1 segment. No aneurysms are identified. The superior sagittal sinus, vein of Paresh, internal cerebral veins, straight sinus, transverse sinuses and sigmoid sinuses are patent. Axial source data: There are no suspicious findings the lung apices. There is no upper mediastinal adenopathy. There is a small vague nodule in the right thyroid lobe measuring 1 cm. There is no cervical adenopathy. In the brain, there are old infarcts in both occipital lobes and the left frontal and parietal lobes. 1. Occluded cervical left internal carotid artery. There is an abrupt cut off. This may be an acute occlusion. This cavernous sinus segment is diminutive. 2. Occluded right internal carotid artery. There is a normal cavernous sinus segment. 3. Absent A1 and A2 segments of the left anterior cerebral artery. 4. Patent but small left middle cerebral artery and proximal branches. 5. Possible occlusion of the right subclavian artery at its origin. This is not well seen. The proximal right vertebral artery is not seen. This may also be occluded. These findings were telephoned to Sary Orellana MD at 11:48 AM on 12/12/2022. Christianne Daniels **This report has been created using voice recognition software. It may contain minor errors which are inherent in voice recognition technology. ** Final report electronically signed by Dr. Maulik Cabral on 12/12/2022 11:50 AM    XR CHEST PORTABLE    Result Date: 12/12/2022  PROCEDURE: XR CHEST PORTABLE CLINICAL INFORMATION: ams. COMPARISON: Chest x-ray dated 22nd of November 2022. TECHNIQUE: AP upright view of the chest. FINDINGS: There is borderline cardiomegaly. .The mediastinum is not widened. There is  atelectasis or scarring at the left lung base. The remaining lungs are clear. There are no effusions. . The pulmonary vascularity is normal. No suspicious osseous lesions are present. 1. Borderline cardiomegaly. 2. Atelectasis or scarring at left lung base. 3. Otherwise negative chest x-ray. . **This report has been created using voice recognition software. It may contain minor errors which are inherent in voice recognition technology. ** Final report electronically signed by DR Rosmery Cowan on 12/12/2022 12:37 PM       Assessment and Plan:        Left-sided weakness and sensory deficit s/p TNK administration 12/12/22 1108  Imaging  CTH negative for acute intracranial abnormalities. Chronic strokes of left frontal lobe, bilateral occipital lobes, left parietal lobe, and right basal ganglia, full read above. CTA of head and neck revealed bilateral internal carotid occlusions, absent A1 and A2 segments of L GILDARDO, possible occlusions of R subclavian and proximal R vertebral arteries, full read above. No need for carotid ultrasound. TNK administered 12/12/22 1108. MRI of brain without- acute stroke of the right frontopariteal junction, posterior right frontal lobe, post. right temporal lobe,  anterior inferior occipital lobe, subacute infarct of the right occipital lobe. 2D echocardiogram ordered. Stat CT head is needed if the patient develops new-onset altered mental status, a severe headache, or new-onset neurologic deficit. Risk factors and medications  Systolic -037. Keep well hydrated. Initiate normal saline at 75 ml/hr as needed. Resume  + Plavix  Will check [employer] P2Y12 platelet inhibition test  HgbA1C 7.3, recommend tight control of A1c with goal of <7.0 if attainable. LDL 71, goal of 45-70. Continue Lipitor. Smoking and alcohol cessation when applicable. Provide stroke eduction for individualized risk factors. DVT prophylaxis with lovenox  Other recommendations  Dysphagia screen prior to oral intake  PT/OT/SLP Consult  EKG/Telemetry to monitor for atrial fibrillation  NIHSS every shift. Neuro checks per unit unless otherwise specified. Head of bed 45 degree. Neurology following. Please call with any questions or concerns.      This case was discussed with Dr. Lilian Alcazar and he is in agreement with the assessment and plan.    Electronically signed by Bennie Mcburney, PA-C on 12/13/22 at 9:49 AM EST

## 2022-12-13 NOTE — PLAN OF CARE
Problem: Discharge Planning  Goal: Discharge to home or other facility with appropriate resources  Outcome: Progressing  Flowsheets  Taken 12/13/2022 0000 by Tigist Gonzalez RN  Discharge to home or other facility with appropriate resources: Identify barriers to discharge with patient and caregiver  Taken 12/12/2022 2000 by Eloina Dhaliwal RN  Discharge to home or other facility with appropriate resources:   Identify barriers to discharge with patient and caregiver   Arrange for needed discharge resources and transportation as appropriate   Identify discharge learning needs (meds, wound care, etc)     Problem: Neurosensory - Adult  Goal: Achieves stable or improved neurological status  Outcome: Progressing  Flowsheets  Taken 12/13/2022 0000 by Tigist Gonzalez RN  Achieves stable or improved neurological status:   Assess for and report changes in neurological status   Initiate measures to prevent increased intracranial pressure   Maintain blood pressure and fluid volume within ordered parameters to optimize cerebral perfusion and minimize risk of hemorrhage  Taken 12/12/2022 2000 by Eloina Dhaliwal RN  Achieves stable or improved neurological status:   Assess for and report changes in neurological status   Initiate measures to prevent increased intracranial pressure   Maintain blood pressure and fluid volume within ordered parameters to optimize cerebral perfusion and minimize risk of hemorrhage   Monitor temperature, glucose, and sodium.  Initiate appropriate interventions as ordered  Goal: Achieves maximal functionality and self care  Outcome: Progressing  Flowsheets  Taken 12/13/2022 0000 by Tigist Gonzalez RN  Achieves maximal functionality and self care: Monitor swallowing and airway patency with patient fatigue and changes in neurological status  Taken 12/12/2022 2000 by Eloina Dhaliwal RN  Achieves maximal functionality and self care:   Monitor swallowing and airway patency with patient fatigue and changes in neurological status   Encourage and assist patient to increase activity and self care with guidance from physical therapy/occupational therapy   Encourage visually impaired, hearing impaired and aphasic patients to use assistive/communication devices     Problem: Hematologic - Adult  Goal: Maintains hematologic stability  Outcome: Progressing  Flowsheets  Taken 12/13/2022 0000 by Court Olmstead RN  Maintains hematologic stability: Assess for signs and symptoms of bleeding or hemorrhage  Taken 12/12/2022 2000 by Gutierrez Carias RN  Maintains hematologic stability:   Assess for signs and symptoms of bleeding or hemorrhage   Monitor labs for bleeding or clotting disorders   Administer blood products/factors as ordered     Problem: Pain  Goal: Verbalizes/displays adequate comfort level or baseline comfort level  Outcome: Progressing  Flowsheets  Taken 12/13/2022 0400 by Court Olmstead RN  Verbalizes/displays adequate comfort level or baseline comfort level: Encourage patient to monitor pain and request assistance  Taken 12/13/2022 0000 by Court Olmstead RN  Verbalizes/displays adequate comfort level or baseline comfort level: Encourage patient to monitor pain and request assistance  Taken 12/12/2022 2000 by Gutierrez Carias RN  Verbalizes/displays adequate comfort level or baseline comfort level:   Encourage patient to monitor pain and request assistance   Assess pain using appropriate pain scale   Administer analgesics based on type and severity of pain and evaluate response     Problem: Safety - Adult  Goal: Free from fall injury  Outcome: Progressing  Flowsheets (Taken 12/12/2022 1819 by Morena Hill RN)  Free From Fall Injury:   Instruct family/caregiver on patient safety   Based on caregiver fall risk screen, instruct family/caregiver to ask for assistance with transferring infant if caregiver noted to have fall risk factors     Problem: Skin/Tissue Integrity  Goal: Absence of new skin breakdown  Description: 1. Monitor for areas of redness and/or skin breakdown  2. Assess vascular access sites hourly  3. Every 4-6 hours minimum:  Change oxygen saturation probe site  4. Every 4-6 hours:  If on nasal continuous positive airway pressure, respiratory therapy assess nares and determine need for appliance change or resting period.   Outcome: Progressing  Note: Q2 turns and PRN

## 2022-12-13 NOTE — CARE COORDINATION
12/13/22, 1:43 PM EST  Discharge Planning Evaluation  Social work consult received, patient from [facility]. Patient/Family preference is to return to [facility]. The patient's current payor source at the facility is [facility]. Medicare skilled days available: yes  Insurance precert:  yes  Spoke with Rupert Baeza at the facility. Patient bed hold: yes  Anticipated transport plan: unsure at this time  Do they require COVID 19 test to return to [facility]  Is there a required time frame which which COVID test needs done:prior to discharge  Patient's Healthcare Decision Maker: Legal Next of Katherin 70      AMERICA spoke with Rupert Baeza at [facility], he is asking for SW to complete PASRR as pt was admitted to them under a 30 day Convalescent stay.   AMERICA completed PASRR

## 2022-12-13 NOTE — PROGRESS NOTES
[facility] THERAPY  [facility] ICU 4D  Speech - Language - Cognitive Evaluation + Clinical Swallow Evaluation    SLP Individual Minutes  Time In: 9483  Time Out: 6851  Minutes: 22  Timed Code Treatment Minutes: 0 Minutes     Speech, Language, Cognitive Evaluation: 13 minutes  Clinical Swallow Evaluation: 9 minutes    Date: 2022  Patient Name: Jennifer Banks      CSN: [id number]   : 1960  (65 y.o.)  Gender: female   Referring Physician:  Gwen Early PA-C  Diagnosis: Acute CVA   Precautions: Fall risk, aspiration precautions  History of Present Illness/Injury: Patient admitted to [facility] with above med dx; please refer to physician H&P for full details. Per chart review, \"58 y/o  Female with PMH DM2, GERD, HTN, HLD, Depression/Anxiety, Asthma, and Hx CVA in . Patient presented to [facility] ED as a stroke alert with LKW on  at 9:30 AM with NIH 5 with limb ataxia, hemianopia, and dysarthria. CTA was significant for findings of occluded left and right ICA. She received TNK in the ED and Neuro-Intervention was consulted who has no plan for endovascular intervention at this time. Patient was transferred to the ICU for monitoring pos-TNK. Patient's brother Francesca Hadley is at bedside who provides most of patient's history. He states that this all started before . Although she had a history of stroke, she had minimal deficits except some cognitive impairment and living independently prior to . He states that she went to [facility] ED on  with complaints of dizziness. She had a CT Head at that time with no acute findings and thus discharged home. She then went back to the ED at [facility] on 22 and was transferred to [address] [place] [address] in [place]. At that time she complained of headache, fluctuating left arm weakness, slurred speech, and was not able to see anything in her left visual field.  On exam at that time she was noted to have left arm weakness, left hemineglect, right gaze deviation, aphasia, and dysarthria. She did not receive tPA at that time as she was out of the window with NIH 6. She underwent imaging and cerebral angiogram that showed complete occlusion of right ICA, left ICA and right PCA. There was noted to be some taking no in proximal segment of of ICA but most of the branches including GILDARDO/MCA was filled by ECA branches. Due to chronic occlusion, the decision was made not to put stent at that time as it will have more risks than benefits. She was thus discharged to SNF with ASA, Plavix, and Lipitor 80mg QD. Brother reports that the newest symptoms he has noticed is her dysarthria that has progressively been getting worse since a few days ago. \"    CT head 12/13/2022  Impression   Impression:   1. Developing subacute right parietotemporal infarct. ST consulted to complete clinical swallow evaluation and assessment of cognitive linguistic domains to develop goals per POC as indicated s/t admitting dx. Past Medical History:   Diagnosis Date    Asthma, mild persistent     Depression with anxiety     DM2 (diabetes mellitus, type 2) ([facility])     Dyslipidemia     GERD (gastroesophageal reflux disease)     Glaucoma     History of CVA (cerebrovascular accident)     x 2 per pt; last was ~2010    Hypertension, essential     Microalbuminuria due to type 2 diabetes mellitus ([facility] [address]     Nicotine dependence     Umbilical hernia     as a complication  after cholecystectomy per pt    Urge urinary incontinence        Pain: No pain reported. Subjective:  RN Judd with approval to proceed with evaluations. Upon arrival, patient resting in bed with brother at bedside. Active participation in assessments despite flat affect and fatigue. ECF (post-acute rehab) documentation supported, \"mechanical soft and thin liquid\" diet.      SOCIAL HISTORY:   **social hx obtained prior to post-acute rehab placement  Living Arrangements: [facility] [place], independently  Work History:  Retired; factory work   Education Level: x1 year college   Driving Status: Does not drive  Finance Management: Independent  Medication Management: Independent  ADL's: Independent. Hobbies: Drawing   Vision Status: Glasses  Hearing: WFL     SPEECH / VOICE:  Respiration:   Breathing Pattern: Diaphragmatic/Abdominal  Respiration/Phonation Coordination: WFL  Breath Support: WFL    Articulation: Impaired: Decreased Articulatory Precision, Blended Word Boundaries, and Decreased Articulatory Coordination  DDK Rates (Norm Rates 4.5-7.5)  Rate: Too Slow  Rhythm: . Incoordinated  Precision: . Incoordinated  Word Level: Incoordinated  Sentence Level: Incoordinated    Conversation:    25%-50%    LANGUAGE:  Receptive:  1 Step Commands: 3/3  2 Step Commands: 2/2  Simple Yes/No Questions: 3/3  Complex Yes/No Questions: 1/3    Expressive:  Automatic Speech: WFL numerical counting 1-10  Sentence Completion (open-ended): 3/3  Confrontational Naming: 3/3  Responsive Namin/2  Sentence Formulation: Intact for basic wants/needs  Conversational Speech: Impaired thought organization   Paraphasias: None evidenced   Repetition: 0/2 sentence level    COGNITION:  Sharad Cognitive Assessment (MOCA) version 7.1 completed. Patient scored 13/25. Normal is greater than or equal to 21/25.   *Inclusion of +1 point given highest level of education achieved less than/equal to 12th grade or GED with limited-0 post-secondary schooling   **Adjusted score given patient not able to complete visuospatial/executive functioning subtests   Orientation: 6/6  Immediate Recall: 4/5  Short-Term Recall: 0/5  Divergent Naming: Impaired  Problem Solving: 3/3  Reasonin/2 verbal  Sequencin/1  Thought Organization: Impaired  Insight: Concerns for functional implications  Attention: Higher level deficits  Math Computation: 1/3 serial subtraction   Executive Functioning: Impaired    SWALLOWING:    Respiratory Status: Nasal Canula      Behavioral Observation: Alert, Oriented, and Dysarthric    CRANIAL NERVE ASSESSMENT   CN V (Trigeminal) Closes and Opens Mandible Impaired    Rotary Jaw Movement Impaired      CN VII (Facial) Cheeks Hold Food out of Sulci Impaired: Unilateral - Left    Opens, Closes/Seals, Protrudes, Retracts Lips Impaired: Unilateral - Left    General Appearance Impaired; Mild L Facial Droop    Sensation Impaired      CN X (Vagus - Pharyngeal) Raises Back of Tongue WFL      CN XI (Accessory) Lifts Soft Palate WFL      CN XII (Hypoglossal) Elevates Tongue Up and Back WFL    Protrusion   WFL    Lateralizes Tongue Impaired    Sensation Impaired      Other Observations Dentition Edentulous    Vocal Quality Dysphonia    Cough Dystussia      PATIENT WAS EVALUATED USING:  Ice Chips, Thin Liquids, and Puree    ORAL PHASE:  Impaired:  Impaired AP Movement and Impaired Mastication    PHARYNGEAL PHASE:  Impaired:  Suspected Dysfunction     SIGNS AND SYMPTOMS OF LARYNGEAL PENETRATION / ASPIRATION:  No signs/symptoms of aspiration evident in this evaluation, but cannot rule out silent aspiration. INSTRUMENTAL EVALUATION: Modified Barium Swallow (MBS)    DIET RECOMMENDATIONS:  NPO; exceptions for ice chips post oral care, critical/crucial medications crushed in puree (as pharmacy permits)    STRATEGIES: Strategies pending MBS completion     RECOMMENDATIONS/ASSESSMENT:  DIAGNOSTIC IMPRESSIONS:  Clinical Swallow Evaluation: Patient presents with moderate oral dysphagia with inability to fully discern potential presence of pharyngeal phase deficits without formal instrumentation. Oral phase compounded d/t significance of lingual body weakness to impact overall mastication+manipulation skills for achievement of AP transit. Pharyngeal trigger consistently achieved, however, concerns prevalent for a potential dysfunction s/t acuity of intracranial findings and pertinent hx for CVA.      Recommendations to maintain NPO diet level, exceptions for ice chips post oral care and critical/crucial medications crushed in puree (as pharmacy permits). F/u formal instrumentation via MBS warranted for further diagnostic assessment to r/o deficits and to optimize dysphagia management POC development. *post evaluation, patient without respiratory distress upon leaving room; RN Judd notified re: clinical findings and recommendations from the assessment; verbal receptiveness noted     Speech, Language, Cognitive Evaluation: Patient presents with a moderate cognitive impairment as derived by clinical findings outlined above to negatively influence abilities to return to ADL/IADL completion per baseline function in independent living. Expressive and receptive language domains grossly intact with no apparent communicative breakdowns. Speech intelligibility best approximates 40% given presence of blended word boundaries, imprecise articulatory placement, and slow rate of speech output; dysarthria notable. Skilled ST services are recommended to address the above aforementioned deficits to improve cognitive communication skills for optimal integration into current/future settings. Rehabilitation Potential: good  Discharge Recommendations: Inpatient Rehabilitation    EDUCATION:  Learner: Patient and Family  Education:  Reviewed results and recommendations of this evaluation, Reviewed diet and strategies, Reviewed signs, symptoms and risks of aspiration, Reviewed ST goals and Plan of Care, and Reviewed recommendations for follow-up  Evaluation of Education: Verbalizes understanding and Needs further instruction    PLAN:  Recommendations pending MBS  Skilled SLP intervention on acute care 3-5 x per week or until goals met and/or pt plateaus in function. Specific interventions for next session may include: cognitive communication rehabilitation . PATIENT GOAL:    Did not state. Will further assess during treatment.     SHORT TERM GOALS:  Short Term Goals  Time Frame for Short Term Goals: 2 weeks  Goal 1: Complete formal instrumentation via MBS to further evaluate pharyngeal integrity and to optimize dysphaiga management POC development. Goal 2: Patient will engage in structured speech drills/tasks (DDK rates, multi-syllabic word repetition, core functional phrases) with implementation of SOS speech strategies to improve intelligibility to 50% in known contexts to optimize speech production skills for listener comprehension. Goal 3: Patient will complete functional immediate/delayed recall tasks with 70% accuracy, mod cues to improve retention of pertinent information. Goal 4: Patient will complete problem solving, verbal/visual reasoning, and executive functioning tasks (time, money/math/finances, medications) with 70% accuracy, mod cues to improve contributions within ADL/IADL requirements. Goal 5: Patient will complete sequencing and thought organization tasks with 70% accuracy, mod cues to improve mental flexibility for daily living scenarios.     LONG TERM GOALS:  No LTG established given short ELOS      Brooke Ceron M.A., [facility]

## 2022-12-14 ENCOUNTER — APPOINTMENT (OUTPATIENT)
Dept: GENERAL RADIOLOGY | Age: 62
End: 2022-12-14
Payer: MEDICARE

## 2022-12-14 LAB
AEROBIC CULTURE: NORMAL
ANION GAP SERPL CALCULATED.3IONS-SCNC: 11 MEQ/L (ref 8–16)
BUN BLDV-MCNC: 9 MG/DL (ref 7–22)
CALCIUM SERPL-MCNC: 9.5 MG/DL (ref 8.5–10.5)
CHLORIDE BLD-SCNC: 102 MEQ/L (ref 98–111)
CO2: 30 MEQ/L (ref 23–33)
CREAT SERPL-MCNC: 0.7 MG/DL (ref 0.4–1.2)
ERYTHROCYTE [DISTWIDTH] IN BLOOD BY AUTOMATED COUNT: 14.8 % (ref 11.5–14.5)
ERYTHROCYTE [DISTWIDTH] IN BLOOD BY AUTOMATED COUNT: 50.9 FL (ref 35–45)
GFR SERPL CREATININE-BSD FRML MDRD: > 60 ML/MIN/1.73M2
GLUCOSE BLD-MCNC: 121 MG/DL (ref 70–108)
GLUCOSE BLD-MCNC: 129 MG/DL (ref 70–108)
GLUCOSE BLD-MCNC: 135 MG/DL (ref 70–108)
GLUCOSE BLD-MCNC: 149 MG/DL (ref 70–108)
GLUCOSE BLD-MCNC: 153 MG/DL (ref 70–108)
HCT VFR BLD CALC: 36.8 % (ref 37–47)
HEMOGLOBIN: 11.8 GM/DL (ref 12–16)
MCH RBC QN AUTO: 30.1 PG (ref 26–33)
MCHC RBC AUTO-ENTMCNC: 32.1 GM/DL (ref 32.2–35.5)
MCV RBC AUTO: 93.9 FL (ref 81–99)
PLATELET # BLD: 242 THOU/MM3 (ref 130–400)
PMV BLD AUTO: 10.5 FL (ref 9.4–12.4)
POTASSIUM REFLEX MAGNESIUM: 3.9 MEQ/L (ref 3.5–5.2)
RBC # BLD: 3.92 MILL/MM3 (ref 4.2–5.4)
SODIUM BLD-SCNC: 143 MEQ/L (ref 135–145)
WBC # BLD: 8.3 THOU/MM3 (ref 4.8–10.8)

## 2022-12-14 PROCEDURE — 6370000000 HC RX 637 (ALT 250 FOR IP): Performed by: STUDENT IN AN ORGANIZED HEALTH CARE EDUCATION/TRAINING PROGRAM

## 2022-12-14 PROCEDURE — 2060000000 HC ICU INTERMEDIATE R&B

## 2022-12-14 PROCEDURE — 6360000002 HC RX W HCPCS: Performed by: STUDENT IN AN ORGANIZED HEALTH CARE EDUCATION/TRAINING PROGRAM

## 2022-12-14 PROCEDURE — 97530 THERAPEUTIC ACTIVITIES: CPT

## 2022-12-14 PROCEDURE — 82948 REAGENT STRIP/BLOOD GLUCOSE: CPT

## 2022-12-14 PROCEDURE — 85027 COMPLETE CBC AUTOMATED: CPT

## 2022-12-14 PROCEDURE — 99232 SBSQ HOSP IP/OBS MODERATE 35: CPT | Performed by: SOCIAL WORKER

## 2022-12-14 PROCEDURE — 74230 X-RAY XM SWLNG FUNCJ C+: CPT

## 2022-12-14 PROCEDURE — 92611 MOTION FLUOROSCOPY/SWALLOW: CPT

## 2022-12-14 PROCEDURE — 6370000000 HC RX 637 (ALT 250 FOR IP)

## 2022-12-14 PROCEDURE — 80048 BASIC METABOLIC PNL TOTAL CA: CPT

## 2022-12-14 PROCEDURE — 6370000000 HC RX 637 (ALT 250 FOR IP): Performed by: PHYSICIAN ASSISTANT

## 2022-12-14 PROCEDURE — 36415 COLL VENOUS BLD VENIPUNCTURE: CPT

## 2022-12-14 PROCEDURE — 2500000003 HC RX 250 WO HCPCS: Performed by: INTERNAL MEDICINE

## 2022-12-14 PROCEDURE — 99233 SBSQ HOSP IP/OBS HIGH 50: CPT | Performed by: STUDENT IN AN ORGANIZED HEALTH CARE EDUCATION/TRAINING PROGRAM

## 2022-12-14 PROCEDURE — 6370000000 HC RX 637 (ALT 250 FOR IP): Performed by: SOCIAL WORKER

## 2022-12-14 PROCEDURE — 2580000003 HC RX 258: Performed by: STUDENT IN AN ORGANIZED HEALTH CARE EDUCATION/TRAINING PROGRAM

## 2022-12-14 RX ORDER — CARVEDILOL 6.25 MG/1
6.25 TABLET ORAL 2 TIMES DAILY WITH MEALS
Status: DISCONTINUED | OUTPATIENT
Start: 2022-12-14 | End: 2022-12-15

## 2022-12-14 RX ORDER — ASPIRIN 81 MG/1
81 TABLET, CHEWABLE ORAL DAILY
Status: DISCONTINUED | OUTPATIENT
Start: 2022-12-14 | End: 2022-12-17 | Stop reason: HOSPADM

## 2022-12-14 RX ORDER — LISINOPRIL 20 MG/1
20 TABLET ORAL ONCE
Status: COMPLETED | OUTPATIENT
Start: 2022-12-14 | End: 2022-12-14

## 2022-12-14 RX ORDER — ATORVASTATIN CALCIUM 80 MG/1
80 TABLET, FILM COATED ORAL NIGHTLY
COMMUNITY

## 2022-12-14 RX ORDER — PANTOPRAZOLE SODIUM 40 MG/1
40 TABLET, DELAYED RELEASE ORAL
Status: DISCONTINUED | OUTPATIENT
Start: 2022-12-15 | End: 2022-12-17 | Stop reason: HOSPADM

## 2022-12-14 RX ORDER — ASPIRIN 81 MG/1
81 TABLET ORAL DAILY
COMMUNITY

## 2022-12-14 RX ORDER — ESCITALOPRAM OXALATE 10 MG/1
10 TABLET ORAL DAILY
Status: DISCONTINUED | OUTPATIENT
Start: 2022-12-15 | End: 2022-12-17 | Stop reason: HOSPADM

## 2022-12-14 RX ORDER — LISINOPRIL 40 MG/1
40 TABLET ORAL DAILY
Status: DISCONTINUED | OUTPATIENT
Start: 2022-12-15 | End: 2022-12-17 | Stop reason: HOSPADM

## 2022-12-14 RX ADMIN — Medication 3 MG: at 20:52

## 2022-12-14 RX ADMIN — BARIUM SULFATE 60 ML: 400 SUSPENSION ORAL at 10:41

## 2022-12-14 RX ADMIN — ATORVASTATIN CALCIUM 80 MG: 80 TABLET, FILM COATED ORAL at 20:52

## 2022-12-14 RX ADMIN — ATENOLOL 25 MG: 25 TABLET ORAL at 09:50

## 2022-12-14 RX ADMIN — AMLODIPINE BESYLATE 10 MG: 10 TABLET ORAL at 09:50

## 2022-12-14 RX ADMIN — SODIUM CHLORIDE, PRESERVATIVE FREE 10 ML: 5 INJECTION INTRAVENOUS at 20:52

## 2022-12-14 RX ADMIN — LISINOPRIL 20 MG: 20 TABLET ORAL at 12:19

## 2022-12-14 RX ADMIN — TICAGRELOR 90 MG: 90 TABLET ORAL at 20:52

## 2022-12-14 RX ADMIN — BARIUM SULFATE 20 ML: 400 PASTE ORAL at 10:41

## 2022-12-14 RX ADMIN — BARIUM SULFATE 80 ML: 0.81 POWDER, FOR SUSPENSION ORAL at 10:41

## 2022-12-14 RX ADMIN — TICAGRELOR 90 MG: 90 TABLET ORAL at 09:50

## 2022-12-14 RX ADMIN — SODIUM CHLORIDE, PRESERVATIVE FREE 10 ML: 5 INJECTION INTRAVENOUS at 08:07

## 2022-12-14 RX ADMIN — CEFTRIAXONE SODIUM 1000 MG: 1 INJECTION, POWDER, FOR SOLUTION INTRAMUSCULAR; INTRAVENOUS at 08:07

## 2022-12-14 RX ADMIN — CARVEDILOL 6.25 MG: 6.25 TABLET, FILM COATED ORAL at 17:47

## 2022-12-14 RX ADMIN — LISINOPRIL 20 MG: 20 TABLET ORAL at 09:50

## 2022-12-14 RX ADMIN — ASPIRIN 81 MG: 81 TABLET, CHEWABLE ORAL at 09:50

## 2022-12-14 ASSESSMENT — PAIN SCALES - GENERAL
PAINLEVEL_OUTOF10: 0

## 2022-12-14 ASSESSMENT — ENCOUNTER SYMPTOMS
ABDOMINAL PAIN: 0
SHORTNESS OF BREATH: 0
VOMITING: 1
COUGH: 1
PHOTOPHOBIA: 0
NAUSEA: 1
SORE THROAT: 0
RHINORRHEA: 0

## 2022-12-14 NOTE — PLAN OF CARE
Problem: Discharge Planning  Goal: Discharge to home or other facility with appropriate resources  12/14/2022 1058 by Zbigniew Peña RN  Outcome: Progressing  Flowsheets (Taken 12/14/2022 0803)  Discharge to home or other facility with appropriate resources: Identify barriers to discharge with patient and caregiver  Note: Discharge planning in process and discussed with patient/family. Social work consulted for any additional needs. Care manager aware of discharge needs. Problem: Neurosensory - Adult  Goal: Achieves stable or improved neurological status  12/14/2022 1058 by Zbigniew Peña RN  Outcome: Progressing  Flowsheets (Taken 12/14/2022 0803)  Achieves stable or improved neurological status: Assess for and report changes in neurological status  Note: Pt is alert and oriented x4. Able to follow commands. Left side remains weak. Problem: Neurosensory - Adult  Goal: Achieves maximal functionality and self care  12/14/2022 1058 by Zbigniew Peña RN  Outcome: Progressing  Flowsheets (Taken 12/14/2022 0803)  Achieves maximal functionality and self care: Monitor swallowing and airway patency with patient fatigue and changes in neurological status     Problem: Respiratory - Adult  Goal: Achieves optimal ventilation and oxygenation  12/14/2022 1058 by Zbigniew Peña RN  Outcome: Progressing  Flowsheets (Taken 12/14/2022 0803)  Achieves optimal ventilation and oxygenation:   Assess for changes in respiratory status   Assess for changes in mentation and behavior  Note: Pt remains on 2L NC.       Problem: Cardiovascular - Adult  Goal: Maintains optimal cardiac output and hemodynamic stability  12/14/2022 1058 by Zbigniew Peña RN  Outcome: Progressing  Flowsheets (Taken 12/14/2022 0803)  Maintains optimal cardiac output and hemodynamic stability: Monitor blood pressure and heart rate  Note:   Vitals:    12/13/22 2314 12/14/22 0323 12/14/22 0803 12/14/22 0808   BP: (!) 140/69 (!) 149/64 (!) 166/70    Pulse: 59 53 82    Resp: 14 16 18    Temp: 98.4 °F (36.9 °C) 98.8 °F (37.1 °C) 99.2 °F (37.3 °C)    TempSrc: Oral Oral Oral    SpO2: 96% 96% (!) 86% 91%   Weight:  211 lb 6.4 oz (95.9 kg)     Height:              Problem: Skin/Tissue Integrity - Adult  Goal: Skin integrity remains intact  12/14/2022 1058 by Miranda Patten RN  Outcome: Progressing  Flowsheets (Taken 12/14/2022 0803)  Skin Integrity Remains Intact: Monitor for areas of redness and/or skin breakdown  Note: No signs of new skin breakdown with each assessment. Skin remains warm, dry, intact. Mucous membranes pink & moist. Turning patient every two hours. Problem: Skin/Tissue Integrity - Adult  Goal: Oral mucous membranes remain intact  12/14/2022 1058 by Miranda Patten RN  Outcome: Progressing  Flowsheets (Taken 12/14/2022 0803)  Oral Mucous Membranes Remain Intact: Assess oral mucosa and hygiene practices     Problem: Gastrointestinal - Adult  Goal: Maintains adequate nutritional intake  12/14/2022 1058 by Miranda Patten RN  Outcome: Progressing  Flowsheets (Taken 12/14/2022 0803)  Maintains adequate nutritional intake: Monitor percentage of each meal consumed  Note: Pt NPO pending MBS today.       Problem: Metabolic/Fluid and Electrolytes - Adult  Goal: Glucose maintained within prescribed range  12/14/2022 1058 by Miranda Patten RN  Outcome: Progressing  Flowsheets (Taken 12/14/2022 0803)  Glucose maintained within prescribed range: Monitor blood glucose as ordered     Problem: Pain  Goal: Verbalizes/displays adequate comfort level or baseline comfort level  12/14/2022 1058 by Miranda Patten RN  Outcome: Progressing  Verbalizes/displays adequate comfort level or baseline comfort level:   Encourage patient to monitor pain and request assistance   Assess pain using appropriate pain scale   Administer analgesics based on type and severity of pain and evaluate response   Implement non-pharmacological measures as appropriate and evaluate response   Consider cultural and social influences on pain and pain management  Note: Patient able to use 0-10 pain scale, pain goal of no pain. Denies pain at this time. Agreeable to take PRN pain medications. Problem: Safety - Adult  Goal: Free from fall injury  12/14/2022 1058 by Win Weinberg RN  Outcome: Progressing  Free From Fall Injury:   Instruct family/caregiver on patient safety   Based on caregiver fall risk screen, instruct family/caregiver to ask for assistance with transferring infant if caregiver noted to have fall risk factors         Problem: ABCDS Injury Assessment  Goal: Absence of physical injury  12/14/2022 1058 by Win Weinberg RN  Outcome: Progressing  Absence of Physical Injury: Implement safety measures based on patient assessment  Note: Pt free from physical injury this shift. Care plan reviewed with patient. Patient verbalizes understanding of the plan of care and contributed to goal setting.

## 2022-12-14 NOTE — PROGRESS NOTES
Neurology Progress Note    Date:12/14/2022       NTXK:[id number]  Patient Elle Rosales     Date of Birth:[birth year]     Age:62 y.o. Subjective       HPI: Espinoza Norris who is a 65 y.o. female with a history of strokes with reported residual left sided weakness, DM2, HLD, HTN who presented to [facility] ED this morning as an EMS activated stroke alert, initiated at 46. Patient comes from a nursing facility who reported last known well at 0925 this am. EMS reports baseline left side weakness and dysarthria secondary to previous stroke with acute worsening of these deficits this morning while working with therapy. EMS reports patient was leaning to the left and neglecting her left visual field. POC glucose 163 in route, per EMS. Initial /85. Noted to be given Zofran while in EMS due to nausea and one  Initial NIH 6  - partial hemianopia L eye, LUE drift, LLE drift, left sided sensory deficit, dysarthria, intermittent command following. While in CT, patient noted to actively move LUE/LLE anti-gravity, seemingly waxing and waning. She is not on current anticoagulation. Outpatient medications include , Lipitor 40, Plavix 75 mg. CT head negative for acute intracranial abnormalities. Cr 0.78. CTA head and neck revealed bilateral ICA occlusions. On-call neuro interventionalist, Dr. Jeremy Jackson, on site in [place] and recommended giving TNK. Contraindications for TNK ruled out and TNK administered 1108. BP prior to administration 176/87. Patient stable following administration and transferred back to ED while awaiting ICU bed. MRI WO contrast ordered for 24h following TNK administration. Last known well:  0925  Time of stroke alert:  1043  Time of neurology arrival:  1046  Vascular risk factors:  stroke, HTN, HLD, DM2  Initial glucose: 163 en route, per EMS  Old deficits from prior stroke:  reported left sided weakness, dysarthria  INR in ED if anticoagulated:  not applicable.   Initial blood pressure: 166/127, 176/87 prior to TNK administration  Initial NIHSS: 6  Pre-morbid Modified Mellette Scale:  3  Time of initial imaging read:  1102  Thrombolytic administered:  1108  Thrombectomy performed:  not indicated. Puncture time:  not applicable. Upon further evaluation in the ED following stroke alert, patient alert and oriented. Actively moving LLE anti-gravity, but not LUE. She follows some commands, but I was again unable to assess coordination, as patient did not participate in this part of the exam. She continues to complain of nausea and continues to dry heave and cough. BP during repeat examination 155/75 around 1140. At this time, EMOMs intact in all visual fields without gaze deviation. Care everywhere checked - patient noted to have been evaluated by interventional neurology in [facility] where she was admitted from 11/24-12/2/22 for similar symptoms and MRI brain evidence of large acute to subacute stroke of watershed regions and R occipital lobe. Diagnostic cerebral angiogram completed 11/28/2022 in [facility] and revealed complete occlusions of R ICA, L ICA, and R PCA. The decision was made against stent placement at that time. Interval History 12/13/22:  Pt states that she has a headache which is improved with Tylenol and continues with LUE weakness and sensory change. She did have a CTH overnight due to being difficult to arouse. CT was negative for ICH but did show developing stroke. Interval history 12/14/2022:  Patient continues with left upper extremity weakness and sensory change. She reports no new symptoms today. Review of Systems   Review of Systems   Constitutional:  Negative for chills and fever. HENT:  Negative for rhinorrhea and sore throat. Eyes:  Positive for visual disturbance. Negative for photophobia. Respiratory:  Positive for cough. Negative for shortness of breath. Cardiovascular:  Negative for chest pain and palpitations.    Gastrointestinal:  Positive for nausea and vomiting. Negative for abdominal pain. Genitourinary:  Negative for dysuria. Musculoskeletal:  Negative for arthralgias and myalgias. Skin:  Negative for rash. Neurological:  Positive for speech difficulty, weakness and numbness. Negative for dizziness, tremors, seizures, facial asymmetry and headaches. Psychiatric/Behavioral:  Negative for confusion and decreased concentration. The patient is not nervous/anxious. Medications   Scheduled Meds:    ticagrelor  90 mg Oral BID    aspirin  81 mg Oral Daily    [START ON 12/15/2022] lisinopril  40 mg Oral Daily    [START ON 12/15/2022] cefTRIAXone (ROCEPHIN) IVPB in 50 mL NS  1,000 mg IntraVENous Daily    carvedilol  6.25 mg Oral BID WC    amLODIPine  10 mg Oral Daily    atorvastatin  80 mg Oral Nightly    insulin lispro  0-4 Units SubCUTAneous TID WC    insulin lispro  0-4 Units SubCUTAneous Nightly    sodium chloride flush  5-40 mL IntraVENous 2 times per day     Continuous Infusions:    niCARdipine Stopped (12/13/22 2054)    dextrose      sodium chloride       PRN Meds:   Medications Prior to Admission:   No current facility-administered medications on file prior to encounter.      Current Outpatient Medications on File Prior to Encounter   Medication Sig Dispense Refill    gabapentin (NEURONTIN) 100 MG capsule take 1 capsule by mouth three times a day 90 capsule 2    tiZANidine (ZANAFLEX) 4 MG tablet take 1 tablet by mouth every 8 hours if needed for muscle spasm 90 tablet 2    metFORMIN (GLUCOPHAGE) 1000 MG tablet take 1 tablet by mouth twice a day with meals 180 tablet 3    busPIRone (BUSPAR) 15 MG tablet TAKE ONE TABLET BY MOUTH TWICE DAILY @ 9AM & 5PM 180 tablet 3    atorvastatin (LIPITOR) 40 MG tablet take 1 tablet by mouth once daily 90 tablet 3    atenolol (TENORMIN) 25 MG tablet Take 1 tablet by mouth daily 90 tablet 3    oxybutynin (DITROPAN-XL) 10 MG extended release tablet TAKE 1 TABLET BY MOUTH ONCE DAILY 90 tablet 3 amLODIPine-benazepril (LOTREL) 10-20 MG per capsule TAKE 1 CAPSULE BY MOUTH ONCE DAILY 90 capsule 3    escitalopram (LEXAPRO) 10 MG tablet Take 1 tablet by mouth daily 90 tablet 3    Misc. Devices (WALKER) MISC Wheeled walker with a seat (Rollator) 1 each 0    clopidogrel (PLAVIX) 75 MG tablet TAKE 1 TABLET BY MOUTH ONCE DAILY 90 tablet 3    albuterol sulfate HFA (PROAIR HFA) 108 (90 Base) MCG/ACT inhaler Inhale 2 puffs into the lungs every 4 hours as needed for Wheezing or Shortness of Breath 2 Inhaler 3    fluticasone (FLOVENT HFA) 110 MCG/ACT inhaler Inhale 1 puff into the lungs 2 times daily 3 Inhaler 3    omega-3 acid ethyl esters (LOVAZA) 1 g capsule       Blood Glucose Monitoring Suppl KIT Use As Directed 1 kit 0    blood glucose monitor strips Use to check sugars twice daily 300 strip 3    Lancets MISC Use to check sugars twice daily 300 each 3    hydrOXYzine (ATARAX) 25 MG tablet Take 1 tablet by mouth nightly as needed for Anxiety 90 tablet 2    lansoprazole (PREVACID SOLUTAB) 30 MG disintegrating tablet Take 30 mg by mouth daily      vitamin D (CHOLECALCIFEROL) 1000 UNITS TABS tablet Take 2,000 Units by mouth daily       aspirin 325 MG tablet Take 325 mg by mouth daily. Past History    Past Medical History:   has a past medical history of Asthma, mild persistent, Depression with anxiety, DM2 (diabetes mellitus, type 2) ([facility] [address] Dyslipidemia, GERD (gastroesophageal reflux disease), Glaucoma, History of CVA (cerebrovascular accident), Hypertension, essential, Microalbuminuria due to type 2 diabetes mellitus ([facility] [address] Nicotine dependence, Umbilical hernia, and Urge urinary incontinence. Social History:   reports that she has been smoking. She has a 33.00 pack-year smoking history. She has never used smokeless tobacco. She reports that she does not drink alcohol and does not use drugs.      Family History:   Family History   Problem Relation Age of Onset    Diabetes Mother     Glaucoma Mother     Heart Attack Father     Diabetes Sister     Breast Cancer Sister     Diabetes Brother     Colon Polyps Brother 52    Colon Polyps Sister 54    Colon Cancer Neg Hx        Physical Examination        Vitals:  BP (!) 147/68   Pulse 64   Temp 98.8 °F (37.1 °C) (Oral)   Resp 18   Ht 5' 6\" (1.676 m)   Wt 211 lb 6.4 oz (95.9 kg)   SpO2 98%   BMI 34.12 kg/m²   Temp (24hrs), Av.8 °F (37.1 °C), Min:98.4 °F (36.9 °C), Max:99.2 °F (37.3 °C)      I/O (24Hr): Intake/Output Summary (Last 24 hours) at 2022 1737  Last data filed at 2022 2307  Gross per 24 hour   Intake 333.81 ml   Output --   Net 333.81 ml         Physical Exam  Vitals reviewed. Constitutional:       General: She is not in acute distress. Appearance: She is ill-appearing. HENT:      Head: Normocephalic and atraumatic. Right Ear: External ear normal.      Left Ear: External ear normal.      Nose: Nose normal.      Mouth/Throat:      Mouth: Mucous membranes are dry. Pharynx: Oropharynx is clear. No oropharyngeal exudate or posterior oropharyngeal erythema. Eyes:      Extraocular Movements: EOM normal.      Pupils: Pupils are equal, round, and reactive to light. Comments: Initially would not cross midline to the left visual field, resolved upon repeat examination   Pulmonary:      Effort: Pulmonary effort is normal. No respiratory distress. Musculoskeletal:         General: Normal range of motion. Cervical back: Normal range of motion. Right lower leg: No edema. Left lower leg: No edema. Skin:     General: Skin is warm and dry. Findings: No rash. Neurological:      Mental Status: She is alert and oriented to person, place, and time. Comments: Intermittently following commands. Alert and oriented to self, month, year. Psychiatric:         Mood and Affect: Mood normal.         Speech: Speech is slurred.          Behavior: Behavior normal.     Neurologic Exam     Mental Status   Oriented to person, place, and time. Oriented to person. Oriented to place. Oriented to time. Follows commands: intermittently follows commands. Attention: normal. Concentration: normal.   Speech: slurred   Level of consciousness: alert  Able to name object. Able to repeat. Cranial Nerves     CN II   Left visual field deficit: lower temporal and upper temporal quadrant(s)    CN III, IV, VI   Pupils are equal, round, and reactive to light. Extraocular motions are normal.     CN V   Facial sensation intact. Right facial sensation deficit: none  Left facial sensation deficit: none    CN VII   Facial expression full, symmetric. Right facial weakness: none  Left facial weakness: none    CN VIII   CN VIII normal.   Hearing: intact    CN IX, X   CN IX normal.   CN X normal.   Palate: symmetric    CN XI   Right sternocleidomastoid strength: normal  Left sternocleidomastoid strength: normal  Right trapezius strength: normal  Left trapezius strength: weak    CN XII   CN XII normal.   Tongue: not atrophic  Fasciculations: absent  Tongue deviation: none  Pt unable to follow directions for visual field testing, appears to have left temporal visual field deficit. Motor Exam   Muscle bulk: normal  Overall muscle tone: normalRUE/RLE 5/5    LUE no movement  LLE 3/5, antigravity with drift , unable to resist pressure     Sensory Exam     Decreased sensation to light touch left upper and lower extremities. Gait, Coordination, and Reflexes   BARTOLOME coordination, no adventitious motor movements noted on exam. No tremors.        Labs/Imaging/Diagnostics   Labs:  CBC:  Recent Labs     12/12/22  1125 12/13/22  0432 12/14/22  0402   WBC 8.3 9.0 8.3   RBC 4.60 4.12* 3.92*   HGB 13.6 12.3 11.8*   HCT 42.7 38.5 36.8*   MCV 92.8 93.4 93.9    240 242       CHEMISTRIES:  Recent Labs     12/12/22  1312 12/13/22  0432 12/14/22  0402    142 143   K 4.0 3.6 3.9    100 102   CO2 27 32 30   BUN 10 8 9   CREATININE 0.6 0.6 0.7   GLUCOSE 134* 122* 121*       COAGULATION STUDIES:  Recent Labs     12/12/22  1125 12/12/22  1312   INR 0.96 0.98   APTT 30.5 31.3       LIVER PROFILE:  Recent Labs     12/12/22  1125   AST 18   ALT 17   BILITOT 0.4   ALKPHOS 59       CHOLESTEROL AND A1C:  Recent Labs     12/13/22  0432   LDLCALC 71   HDL 35   CHOL 136   TRIG 149   LABA1C 7.3*        Imaging Last 24 Hours:  CTA HEAD W WO CONTRAST (CODE STROKE)    Result Date: 12/12/2022  PROCEDURE: CTA HEAD W WO CONTRAST, CTA NECK W WO CONTRAST CLINICAL INFORMATION: [address]. Right-sided weakness. Old infarcts on the left. COMPARISON: Head CT 12/12/2022 and 11/22/2022. TECHNIQUE: 1 mm axial images were obtained through the head and neck after the fast bolus administration of contrast. A noncontrast localizer was obtained. 3-D reconstructions were performed on a dedicated 3-D workstation. These include multiplanar MPR images and multiplanar MIP images. Centerline reconstructions were obtained of the carotid systems. Isovue intravenous contrast was given. The I Do Now I Don't application was used in analyzing the CTA head. An image was sent to PACS. All CT scans at this facility use dose modulation, iterative reconstruction, and/or weight-based dosing when appropriate to reduce radiation dose to as low as reasonably achievable. FINDINGS: CTA NECK: Aortic arch and branches: There is calcified atheromatosis of the aortic arch. There is no stenosis at the origin innominate artery, left common carotid artery or the left subclavian artery. There is a possible proximal occlusion of the right subclavian artery. This is obscured by artifact from incoming contrast. Right common carotid artery/ICA: There is some calcified atherosclerosis of the right common carotid artery. There is heavy atherosclerosis of the right carotid bulb. The origin of the right external carotid artery is patent. The right internal carotid artery is occluded throughout its cervical segment.  Left common carotid artery/ICA: The left common carotid artery is normal. The origin of the left external carotid artery is affected by atherosclerosis. There is no gross stenosis. The proximal left carotid bulb is patent. There is been an abrupt cut off. The distal left carotid bulb is occluded. The left internal carotid artery is occluded. Vertebral arteries: The right vertebral artery is small. This is difficult to follow due to artifact from incoming contrast. The proximal segment is not seen. Distally this is a very small vessel. The left vertebral artery is the dominant vessel. This is  normal throughout. CTA HEAD: Internal carotid arteries: The right internal carotid artery reconstitutes at the level skull base. This is due to collateral flow. There is scattered calcified atheromatosis. There is no stenosis of the cavernous segment. The ophthalmic artery origin is  patent. The left internal carotid artery is diminutive. This is a small vessel there is severe stenosis in its cavernous segment. The ophthalmic artery on the left is patent. Middle cerebral arteries: On the right the MCA and its proximal branches are normal. On the left, the MCA is a small vessel. The M1 and M2 branches are patent. Anterior cerebral arteries: There is a normal right A1 segment of the anterior cerebral artery. A left A1 segment is not identified. Only a single A2 segment is identified. Vertebral arteries: The distal right vertebral artery is diminutive. There is a normal dominant left vertebral artery. Basilar artery: Normal. Superior cerebellar arteries: Normal. Posterior cerebral arteries: There is a normal large left posterior cerebral artery. There is a normal left posterior communicating artery. There is a small right posterior communicating artery. There is a aplastic right P1 segment. No aneurysms are identified.  The superior sagittal sinus, vein of Paresh, internal cerebral veins, straight sinus, transverse sinuses and sigmoid sinuses are patent. Axial source data: There are no suspicious findings the lung apices. There is no upper mediastinal adenopathy. There is a small vague nodule in the right thyroid lobe measuring 1 cm. There is no cervical adenopathy. In the brain, there are old infarcts in both occipital lobes and the left frontal and parietal lobes. 1. Occluded cervical left internal carotid artery. There is an abrupt cut off. This may be an acute occlusion. This cavernous sinus segment is diminutive. 2. Occluded right internal carotid artery. There is a normal cavernous sinus segment. 3. Absent A1 and A2 segments of the left anterior cerebral artery. 4. Patent but small left middle cerebral artery and proximal branches. 5. Possible occlusion of the right subclavian artery at its origin. This is not well seen. The proximal right vertebral artery is not seen. This may also be occluded. These findings were telephoned to Maxine Vieira MD at 11:48 AM on 12/12/2022. Rivka Helms **This report has been created using voice recognition software. It may contain minor errors which are inherent in voice recognition technology. ** Final report electronically signed by Dr. Emily France on 12/12/2022 11:50 AM    CT HEAD WO CONTRAST    Result Date: 12/12/2022  PROCEDURE: CT HEAD WO CONTRAST CLINICAL INFORMATION: [address]. Right-sided weakness. COMPARISON: Head CT 11/22/2022. TECHNIQUE: Noncontrast 5 mm axial images were obtained through the brain. Sagittal and coronal reconstructions were obtained. All CT scans at this facility use dose modulation, iterative reconstruction, and/or weight-based dosing when appropriate to reduce radiation dose to as low as reasonably achievable. FINDINGS: There is no hemorrhage. There are no intra-or extra-axial collections. There is no hydrocephalus, midline shift or mass effect. There are stable old infarcts in the left frontal lobe and the left parieto-occipital lobe.  There is also an old appearing infarct in the right occipital lobe. There is an old infarct in the right medial basal ganglia. There is a moderate amount of abnormal low attenuation in the white matter the brain suggesting chronic small vessel ischemic changes. The paranasal sinuses and mastoid air cells are normally aerated. There is no suspicious calvarial abnormality. 1. No acute findings. 2. Stable appearing old infarcts in the left frontal lobe, both occipital lobes and a portion of the left parietal lobe. 3. Small old infarct in the medial aspect of the right basal ganglia. These findings were telephoned to Florentino Trent at 11:02 AM on 12/12/2022 . **This report has been created using voice recognition software. It may contain minor errors which are inherent in voice recognition technology. ** Final report electronically signed by Dr. Malinda Stevens on 12/12/2022 11:06 AM    CTA NECK W WO CONTRAST    Result Date: 12/12/2022  PROCEDURE: CTA HEAD W WO CONTRAST, CTA NECK W WO CONTRAST CLINICAL INFORMATION: [address]. Right-sided weakness. Old infarcts on the left. COMPARISON: Head CT 12/12/2022 and 11/22/2022. TECHNIQUE: 1 mm axial images were obtained through the head and neck after the fast bolus administration of contrast. A noncontrast localizer was obtained. 3-D reconstructions were performed on a dedicated 3-D workstation. These include multiplanar MPR images and multiplanar MIP images. Centerline reconstructions were obtained of the carotid systems. Isovue intravenous contrast was given. The Beam. application was used in analyzing the CTA head. An image was sent to PACS. All CT scans at this facility use dose modulation, iterative reconstruction, and/or weight-based dosing when appropriate to reduce radiation dose to as low as reasonably achievable. FINDINGS: CTA NECK: Aortic arch and branches: There is calcified atheromatosis of the aortic arch.  There is no stenosis at the origin innominate artery, left common carotid artery or the left subclavian artery. There is a possible proximal occlusion of the right subclavian artery. This is obscured by artifact from incoming contrast. Right common carotid artery/ICA: There is some calcified atherosclerosis of the right common carotid artery. There is heavy atherosclerosis of the right carotid bulb. The origin of the right external carotid artery is patent. The right internal carotid artery is occluded throughout its cervical segment. Left common carotid artery/ICA: The left common carotid artery is normal. The origin of the left external carotid artery is affected by atherosclerosis. There is no gross stenosis. The proximal left carotid bulb is patent. There is been an abrupt cut off. The distal left carotid bulb is occluded. The left internal carotid artery is occluded. Vertebral arteries: The right vertebral artery is small. This is difficult to follow due to artifact from incoming contrast. The proximal segment is not seen. Distally this is a very small vessel. The left vertebral artery is the dominant vessel. This is  normal throughout. CTA HEAD: Internal carotid arteries: The right internal carotid artery reconstitutes at the level skull base. This is due to collateral flow. There is scattered calcified atheromatosis. There is no stenosis of the cavernous segment. The ophthalmic artery origin is  patent. The left internal carotid artery is diminutive. This is a small vessel there is severe stenosis in its cavernous segment. The ophthalmic artery on the left is patent. Middle cerebral arteries: On the right the MCA and its proximal branches are normal. On the left, the MCA is a small vessel. The M1 and M2 branches are patent. Anterior cerebral arteries: There is a normal right A1 segment of the anterior cerebral artery. A left A1 segment is not identified. Only a single A2 segment is identified. Vertebral arteries: The distal right vertebral artery is diminutive.  There is a normal dominant left vertebral artery. Basilar artery: Normal. Superior cerebellar arteries: Normal. Posterior cerebral arteries: There is a normal large left posterior cerebral artery. There is a normal left posterior communicating artery. There is a small right posterior communicating artery. There is a aplastic right P1 segment. No aneurysms are identified. The superior sagittal sinus, vein of Paresh, internal cerebral veins, straight sinus, transverse sinuses and sigmoid sinuses are patent. Axial source data: There are no suspicious findings the lung apices. There is no upper mediastinal adenopathy. There is a small vague nodule in the right thyroid lobe measuring 1 cm. There is no cervical adenopathy. In the brain, there are old infarcts in both occipital lobes and the left frontal and parietal lobes. 1. Occluded cervical left internal carotid artery. There is an abrupt cut off. This may be an acute occlusion. This cavernous sinus segment is diminutive. 2. Occluded right internal carotid artery. There is a normal cavernous sinus segment. 3. Absent A1 and A2 segments of the left anterior cerebral artery. 4. Patent but small left middle cerebral artery and proximal branches. 5. Possible occlusion of the right subclavian artery at its origin. This is not well seen. The proximal right vertebral artery is not seen. This may also be occluded. These findings were telephoned to Ivon High MD at 11:48 AM on 12/12/2022. Loreatha Press **This report has been created using voice recognition software. It may contain minor errors which are inherent in voice recognition technology. ** Final report electronically signed by Dr. Paramjit Paige on 12/12/2022 11:50 AM    XR CHEST PORTABLE    Result Date: 12/12/2022  PROCEDURE: XR CHEST PORTABLE CLINICAL INFORMATION: ams. COMPARISON: Chest x-ray dated 22nd of November 2022. TECHNIQUE: AP upright view of the chest. FINDINGS: There is borderline cardiomegaly. .The mediastinum is not widened.   There is atelectasis or scarring at the left lung base. The remaining lungs are clear. There are no effusions. . The pulmonary vascularity is normal. No suspicious osseous lesions are present. 1. Borderline cardiomegaly. 2. Atelectasis or scarring at left lung base. 3. Otherwise negative chest x-ray. . **This report has been created using voice recognition software. It may contain minor errors which are inherent in voice recognition technology. ** Final report electronically signed by DR Jeff Stuart on 12/12/2022 12:37 PM       Assessment and Plan:        Acute Infarct s/p TNK administration   Imaging  CTH negative for acute intracranial abnormalities. Chronic strokes of left frontal lobe, bilateral occipital lobes, left parietal lobe, and right basal ganglia, full read above. CTA of head and neck revealed bilateral internal carotid occlusions, absent A1 and A2 segments of L GILDARDO, possible occlusions of R subclavian and proximal R vertebral arteries, full read above. No need for carotid ultrasound. TNK administered 12/12/22 1108. MRI of brain without- acute stroke of the right frontopariteal junction, posterior right frontal lobe, post. right temporal lobe,  anterior inferior occipital lobe, subacute infarct of the right occipital lobe. 2D echocardiogram technically difficult study, ejection fraction 60%, LV function is normal mild aortic stenosis. .  Stat CT head is needed if the patient develops new-onset altered mental status, a severe headache, or new-onset neurologic deficit. Risk factors and medications  Systolic -158. Keep well hydrated. Initiate normal saline at 75 ml/hr as needed. P2 Y 12 inhibitor test shows Plavix resistance  Stop Plavix 75 mg + aspirin 325 mg daily  Start Brilinta 90 mg twice daily plus aspirin 81 mg daily  HgbA1C 7.3, recommend tight control of A1c with goal of <7.0 if attainable. LDL 71, goal of 45-70. Continue Lipitor. Smoking and alcohol cessation when applicable.   Provide stroke eduction for individualized risk factors. DVT prophylaxis with lovenox  Other recommendations  Dysphagia screen prior to oral intake  PT/OT/SLP following  Inpatient rehab consulted and recommending return to SNF  EKG/Telemetry to monitor for atrial fibrillation  NIHSS every shift. Neuro checks per unit unless otherwise specified. Head of bed 45 degree. No further work-up or recommendations per neurology, we will sign off at this time. Please call with any questions or if we can be of additional assistance. Thank for this consult. This case was discussed with Dr. Camilo Pritchett and he is in agreement with the assessment and plan.     Electronically signed by Kathe Deluca PA-C on 12/14/22 at 5:44 PM EST

## 2022-12-14 NOTE — PROGRESS NOTES
[place] COORDINATOR CONSULT    Referral Type: internal    Patient Name: Edy Vela      MRN: [id number]    : 1960  (65 y.o.)  Gender: female   Race:White (non-)     Payor Source: Payor: Katheryn Quiñones / Plan: Antonio Wilburn / Product Type: *No Product type* /   Secondary Payor Source:  MEDICAID [place]    Isolation Status: No active isolations       Type of Home: Facility  Home Layout: One level        Additional Comments: Prior to recent hospitalization for CVA and transfer to [facility] for rehab, pt had been living at home alone, independent with all ADL/IADL and mobility. Pt had been at [facility] for rehab since , was walking to/from bathroom with staff utilizing RW per family report. What is treatment plan?   Disciplines Required upon Admission to Inpatient Rehabilitation: Physical Therapy, Occupational Therapy, and Speech Therapy  Post operative: No  Fall: No  Dialysis: No  Diet: Diet NPO  Discussed patient with  and PM&R provider:  per PMR note, patient and family agree on SNF for rehab

## 2022-12-14 NOTE — PROGRESS NOTES
Medication list received via fax/ Home medication list updated in patients chart with medication list provided.

## 2022-12-14 NOTE — PROGRESS NOTES
Spoke with [facility] office, MAR from [facility] not received as of yet. Spoke with [facility], who would work on getting the [facility] faxed to [facility] to complete home med rec.

## 2022-12-14 NOTE — CARE COORDINATION
12/14/22, 12:24 PM EST    DISCHARGE PLANNING EVALUATION    Left a message for Bella Van at [facility] asking for him to start precert for patient. Asked for a call back to confirm. Update 1:00PM- Received a call back from Bella Van at [facility], he will start the precert this afternoon.

## 2022-12-14 NOTE — PROGRESS NOTES
Trever Mcnally 61  OCCUPATIONAL THERAPY MISSED TREATMENT NOTE  [facility] NEUROSCIENCES 4A  4A-07/007-A      Date: 2022  Patient Name: Yessi Pedraza        CSN: [id number]   : 1960  (65 y.o.)  Gender: female   Referring Practitioner: Shravan Kaur PA-C  Diagnosis: CVA         REASON FOR MISSED TREATMENT: Hold Treatment per Nursing Patient recently returned from modified swallow with nursing noting ST states patient had difficulty with following commands and is tired. Requests for therapy to attempt in PM.  Will follow up this afternoon as time allows.

## 2022-12-14 NOTE — PROGRESS NOTES
Internal Medicine Resident Progress Note    Patient:  Remigio Langston    YOB: 1960  Unit/Bed:4A-07/007-A  MRN: [id number]    Acct: [de-identified]   PCP: Ruddy King DO    Date of Admission: 12/12/2022      Assessment/Plan:  Ischemic stroke, recurrent on plavix, in both hemispheres. Hx of past CVA in 2010 and NOV2022. Had been recovering from most recent CVA in SNF with significantly worsened symptoms [id number] while on plavix. NIHSS 6 on arrival. Received TNK and admitted to ICU for 24h. MRI positive for acute infarcts to right frontoparietal junction, posterior right frontal region, posterior right temporal region, anterior-inferior occipital region, and a subacute infarct in the right occipital region. CTA noted bilateral ICA occlusions. No definitive intervention planned as risks of intervention unfortunately outweigh benefits. Echo grossly unremarkable. P2Y12 inhibitor test shows resistance to plavix. Neurology following  Continue brilinta 90mg BID + ASA 81mg daily. Recommend smoking cessation  Continue statin  Continue to maintain -180 per neurology. Palliative consult in light of multiple bihemispheric infarcts over less than 1 month  Continue PT/OT  ?UTI. Patient initially unable to confirm/deny symptoms d/t communication barrier caused by CVAs. Continue course of ceftriaxone  Full urine culture pending, initial result with gram negative bacilli, colony coung >100,000. Acute hypoxic respiratory failure. Remains on 2L vis NC. Likely secondary to CVA. No home O2 needs prior to most recent strokes. Continue supplemental O2, titrate to >90%, wean as tolerated  NIDDM2. On metformin outpatient, currently holding. LD SSI in place, however has not needed insulin during this admit. Continue to monitor with daily BMP  Dysarthria/dysphagia. Secondary to CVAs. Modified barium study [id number] showed laryngeal penetration of thin barium without evidence of aspiration.   Diet per SLP recommendation  Currently recommends pureed diet, mildly thick liquids with intake via cup, and 1-to-1 assistance/supervision during meals. HTN, essential. Was on amlodipine-benazepril 10-20 outpatient. Was not taking atenolol at time of admit. Home meds restarted [id number]. Per neuro, to keep -180, now maintaining in goal on amlodipine 10mg daily and lisinopril 20mg daily. Will consider increasing lisinopril to 40mg daily tomorrow pending clinical course. Depression with anxiety. Has been on buspar, lexapro, and atarax outpatient in the past, however had only been on lexapro at time of admit. Resume home lexapro  HLD. Continue high dose statin. GERD. Resume prevacid  Obesity. BMI 34.76. Noted. Expected discharge date:  TBD    Disposition: SNF vs. Rehab  [] Home  [] TCU  [] Rehab  [] Psych  [] SNF  [] [place]  [] Other-    ===================================================================      Chief Complaint: stroke alert     Hospital Course:   Per original HPI:  Tanvir Talamantes is a 65 y.o. female everyday smoker with a PMHx of NIDDM 2, HLD, HTN, GERD, depression, anxiety, asthma, and prior CVA in 2010 with residual left-sided weakness and dysarthria. She presented to [facility] emergency department as a stroke alert with a last known well time on 12/12/2022 at 9:25 AM. She presented with an NIH score of 6. CT of the head was negative for acute intracranial abnormalities. CTA of the head/neck revealed bilateral ICA occlusions. The patient was consulted by neuro interventional and was subsequently administered TNK. She was admitted to the ICU for further observation post administration. Of note the patient had recently been evaluated here at [facility] on 11/22/2022 with complaint of dizziness. CT head at that time without acute findings and the patient was discharged home.  She later presented to [facility] on 11/24/2022 for similar complaints and transferred to [address] [place] [address] At this time she was evaluated by interventional neurology where she was admitted from 11/24/22-12/2/2022 for similar symptoms. MRI brain at that time had evidence of large acute to subacute stroke of watershed regions and right occipital lobe. Diagnostic cerebral angiogram completed on 11/28/2022 revealed complete occlusion of the right ICA, left ICA, and right PCA. There was no stent intervention at that time. Patient's brother provided majority of the patient's history and stated that this started all before Thanksgiving of this year. He stated that she had prior strokes but with minimal deficit except some cognitive impairment and was living independently prior to Thanksgiving. The patient was discharged to SNF with ASA, Plavix, high intensity statin. \"     Subjective (past 24 hours):   Patient is able to nod/shake her head to questions, but remains very dysarthric. When asked how she feels, she shakes her head indicating she doesn't feel well. By shaking her head, she denies headache, chest pain, dyspnea, or abdominal pain. ROS: reviewed complete ROS unchanged unless otherwise stated in hospital course/subjective portion.        Medications:  Reviewed    Infusion Medications    niCARdipine Stopped (12/13/22 2054)    dextrose      sodium chloride       Scheduled Medications    ticagrelor  90 mg Oral BID    aspirin  81 mg Oral Daily    cefTRIAXone (ROCEPHIN) IV  1,000 mg IntraVENous Q24H    atenolol  25 mg Oral Daily    amLODIPine  10 mg Oral Daily    lisinopril  20 mg Oral Daily    atorvastatin  80 mg Oral Nightly    insulin lispro  0-4 Units SubCUTAneous TID WC    insulin lispro  0-4 Units SubCUTAneous Nightly    sodium chloride flush  5-40 mL IntraVENous 2 times per day     PRN Meds: melatonin, glucose, dextrose bolus **OR** dextrose bolus, glucagon (rDNA), dextrose, sodium chloride flush, sodium chloride, ondansetron **OR** ondansetron, polyethylene glycol, acetaminophen **OR** acetaminophen, promethazine        Intake/Output Summary (Last 24 hours) at 12/14/2022 0908  Last data filed at 12/13/2022 2307  Gross per 24 hour   Intake 669.02 ml   Output --   Net 669.02 ml       Exam:  BP (!) 166/70   Pulse 82   Temp 99.2 °F (37.3 °C) (Oral)   Resp 18   Ht 5' 6\" (1.676 m)   Wt 211 lb 6.4 oz (95.9 kg)   SpO2 91%   BMI 34.12 kg/m²     General: acute on chronically ill-appearing  female, lying in bed, alert to voice and interaction, cooperative and follows commands, appears older than stated age. Eyes:  PERRLA, nonicteric, no conjunctivitis, EOMs appear grossly intact although she had difficulty crossing midline to left. HENT: Normocephalic, atraumatic. Nares normal. Oral mucosa dry. Hearing intact. Neck: Supple, with full range of motion. No gross JVD appreciated. Respiratory:  No extra work of breathing. Clear to auscultation, without rales or wheezes or rhonchi. Cardiovascular: Normal rate, regular rhythm with normal S1/S2 without murmurs. Bilateral lower extremity edema. Abdomen: Soft, non-tender, non-distended with normal bowel sounds. Musculoskeletal: No joint swelling or tenderness. Normal tone. No abnormal movements. Skin: Warm and dry. No rashes or lesions. Neurologic:  Alert, follows commands, severely dysarthric. Capillary Refill: Brisk,< 3 seconds. Peripheral Pulses: +2 palpable, equal bilaterally. Labs:   Recent Labs     12/12/22  1125 12/13/22  0432 12/14/22  0402   WBC 8.3 9.0 8.3   HGB 13.6 12.3 11.8*   HCT 42.7 38.5 36.8*    240 242     Recent Labs     12/12/22  1312 12/13/22  0432 12/14/22  0402    142 143   K 4.0 3.6 3.9    100 102   CO2 27 32 30   BUN 10 8 9   CREATININE 0.6 0.6 0.7   CALCIUM 9.8 9.4 9.5     Recent Labs     12/12/22  1125   AST 18   ALT 17   BILITOT 0.4   ALKPHOS 59     Recent Labs     12/12/22  1125 12/12/22  1312   INR 0.96 0.98     No results for input(s): Marya Yusuf in the last 72 hours.   No results for input(s): PROCAL in the last 72 hours. Lab Results   Component Value Date/Time    NITRU POSITIVE 12/13/2022 01:50 AM    WBCUA 15-25 12/13/2022 01:50 AM    BACTERIA MANY 12/13/2022 01:50 AM    RBCUA 0-2 12/13/2022 01:50 AM    BLOODU TRACE 12/13/2022 01:50 AM    SPECGRAV 1.022 10/17/2019 11:45 AM    SPECGRAV >=1.030 10/17/2019 10:50 AM    GLUCOSEU NEGATIVE 12/13/2022 01:50 AM       Radiology (48 hours):  CTA HEAD W WO CONTRAST (CODE STROKE)    Result Date: 12/12/2022   1. Occluded cervical left internal carotid artery. There is an abrupt cut off. This may be an acute occlusion. This cavernous sinus segment is diminutive. 2. Occluded right internal carotid artery. There is a normal cavernous sinus segment. 3. Absent A1 and A2 segments of the left anterior cerebral artery. 4. Patent but small left middle cerebral artery and proximal branches. 5. Possible occlusion of the right subclavian artery at its origin. This is not well seen. The proximal right vertebral artery is not seen. This may also be occluded. These findings were telephoned to Eloina Manriquez MD at 11:48 AM on 12/12/2022. Nasir Sahu **This report has been created using voice recognition software. It may contain minor errors which are inherent in voice recognition technology. ** Final report electronically signed by Dr. Joy Kim on 12/12/2022 11:50 AM    CT HEAD WO CONTRAST    Result Date: 12/13/2022  Impression: 1. Developing subacute right parietotemporal infarct. This document has been electronically signed by: Lorena Seip, MD on 12/13/2022 01:37 AM All CTs at this facility use dose modulation techniques and iterative reconstructions, and/or weight-based dosing when appropriate to reduce radiation to a low as reasonably achievable. CT HEAD WO CONTRAST    Result Date: 12/12/2022   1. No acute findings. 2. Stable appearing old infarcts in the left frontal lobe, both occipital lobes and a portion of the left parietal lobe.  3. Small old infarct in the medial aspect of the right basal ganglia. These findings were telephoned to Chiqui Kerns at 11:02 AM on 12/12/2022 . **This report has been created using voice recognition software. It may contain minor errors which are inherent in voice recognition technology. ** Final report electronically signed by Dr. Paramjit Paige on 12/12/2022 11:06 AM    CTA NECK W WO CONTRAST    Result Date: 12/12/2022   1. Occluded cervical left internal carotid artery. There is an abrupt cut off. This may be an acute occlusion. This cavernous sinus segment is diminutive. 2. Occluded right internal carotid artery. There is a normal cavernous sinus segment. 3. Absent A1 and A2 segments of the left anterior cerebral artery. 4. Patent but small left middle cerebral artery and proximal branches. 5. Possible occlusion of the right subclavian artery at its origin. This is not well seen. The proximal right vertebral artery is not seen. This may also be occluded. These findings were telephoned to Ivon High MD at 11:48 AM on 12/12/2022. Americashravan Silva **This report has been created using voice recognition software. It may contain minor errors which are inherent in voice recognition technology. ** Final report electronically signed by Dr. Paramjit Paige on 12/12/2022 11:50 AM    XR SHOULDER LEFT (MIN 2 VIEWS)    Result Date: 12/13/2022   1. No evidence of acute osseous abnormality of the left shoulder. 2. Mild degenerative changes of the acromioclavicular joint. **This report has been created using voice recognition software. It may contain minor errors which are inherent in voice recognition technology. ** Final report electronically signed by Dr. Marychuy Priest MD on 12/13/2022 4:14 PM    XR CHEST PORTABLE    Result Date: 12/12/2022  1. Borderline cardiomegaly. 2. Atelectasis or scarring at left lung base. 3. Otherwise negative chest x-ray. . **This report has been created using voice recognition software.  It may contain minor errors which are inherent in voice recognition technology. ** Final report electronically signed by DR Olena Abrams on 12/12/2022 12:37 PM    MRI brain without contrast    Result Date: 12/13/2022   1. Moderate-sized acute infarct involving portions of the right frontoparietal junction, the posterior right frontal lobe, the posterior right temporal lobe and the anterior inferior right occipital lobe. 2. Subacute infarct involving the superior right occipital lobe. There is some associated laminar necrosis. 3. Old infarcts in the left frontal and parietal lobes. **This report has been created using voice recognition software. It may contain minor errors which are inherent in voice recognition technology. ** Final report electronically signed by Dr. Joy Kim on 12/13/2022 12:02 PM       DVT prophylaxis:    [] Lovenox  [] SCDs  [] SQ Heparin  [] Encourage ambulation   [] Already on Anticoagulation       Diet: Diet NPO  Code Status: Full Code  PT/OT: Yes  Tele: Yes  IVF: None if PO intake adequate. Electronically signed by Lorena Razo MD, [id number] on 12/14/2022 at 9:08 AM    Case was discussed with Attending, Dr. Carmella Carrillo.

## 2022-12-14 NOTE — PROGRESS NOTES
Reached out to [facility] at [phone] for patients home medication list.  unable to reach nurse at this time. Did leave message with  asking that patients home medication list be faxed to [facility]. Fax number [phone] provided. Will await fax and complete home medication list once received.

## 2022-12-14 NOTE — PROGRESS NOTES
[address] [facility] 4A  Modified Barium Swallow    SLP Individual Minutes  Time In: 9261  Time Out: 7433  Minutes: 12  Timed Code Treatment Minutes: 0 Minutes       Date: 2022  Patient Name: Suhail Jenkins      CSN: [id number]   : 1960  (65 y.o.)  Gender: female   Referring Physician:  Milka Ac MD  Diagnosis: Acute CVA  Precautions: Fall risk, aspiration precautions  History of Present Illness/Injury: Patient admitted to [facility] with above med dx; please refer to physician H&P for full details. Per chart review, \"56 y/o  Female with PMH DM2, GERD, HTN, HLD, Depression/Anxiety, Asthma, and Hx CVA in . Patient presented to [facility] ED as a stroke alert with LKW on  at 9:30 AM with NIH 5 with limb ataxia, hemianopia, and dysarthria. CTA was significant for findings of occluded left and right ICA. She received TNK in the ED and Neuro-Intervention was consulted who has no plan for endovascular intervention at this time. Patient was transferred to the ICU for monitoring pos-TNK. Patient's brother Yelena Meléndez is at bedside who provides most of patient's history. He states that this all started before . Although she had a history of stroke, she had minimal deficits except some cognitive impairment and living independently prior to . He states that she went to [facility] ED on  with complaints of dizziness. She had a CT Head at that time with no acute findings and thus discharged home. She then went back to the ED at [facility] on 22 and was transferred to [address] [place] [address] in [place]. At that time she complained of headache, fluctuating left arm weakness, slurred speech, and was not able to see anything in her left visual field. On exam at that time she was noted to have left arm weakness, left hemineglect, right gaze deviation, aphasia, and dysarthria.  She did not receive tPA at that time as she was out of the window with NIH 6. She underwent imaging and cerebral angiogram that showed complete occlusion of right ICA, left ICA and right PCA. There was noted to be some taking no in proximal segment of of ICA but most of the branches including GILDARDO/MCA was filled by ECA branches. Due to chronic occlusion, the decision was made not to put stent at that time as it will have more risks than benefits. She was thus discharged to SNF with ASA, Plavix, and Lipitor 80mg QD. Brother reports that the newest symptoms he has noticed is her dysarthria that has progressively been getting worse since a few days ago. \"    Patient has a past medical history of Asthma, mild persistent, Depression with anxiety, DM2 (diabetes mellitus, type 2) ([address] Dyslipidemia, GERD (gastroesophageal reflux disease), Glaucoma, History of CVA (cerebrovascular accident), Hypertension, essential, Microalbuminuria due to type 2 diabetes mellitus ([address] Nicotine dependence, Umbilical hernia, and Urge urinary incontinence. MRI brain 12/13/2022  Impression       1. Moderate-sized acute infarct involving portions of the right frontoparietal junction, the posterior right frontal lobe, the posterior right temporal lobe and the anterior inferior right occipital lobe. 2. Subacute infarct involving the superior right occipital lobe. There is some associated laminar necrosis. 3. Old infarcts in the left frontal and parietal lobes. Formal instrumentation warranted to further evaluate pharyngeal integrity to r/o dysfunction s/t extent of acute intracranial findings. Current Diet: NPO    Pain: No pain reported. SUBJECTIVE:  Patient with arrival to fluoro suite with significant fatigue, not able to maintain eyes in open positioning; wakefulness deemed appropriate to proceed with diagnostic assessment. Dysarthria remains to be notable with speech intelligibility best approximating 25% at date in known contexts.      OBJECTIVE:    Respiratory Status:  Room [employer] Observation:  Alert, Lethargic, and Dysarthric    PATIENT WAS EVALUATED USING:  Barium: Thin Liquids, Mildly Thick Liquids, Puree, and Soft Solids    ORAL PHASE CECILY SCORE: (Dysphagia outcome and severity scale)  3 = Moderate Dysphagia - Total assist with strategies - Two or more consistencies restricted - Moderate oral residue clears with cue    PHARYNGEAL PHASE CECILY SCORE: (Dysphagia outcome and severity scale)  4 = Mild-Moderate Dysphagia - One or two consistencies restricted - may exhibit one or more of the following: Residue clears with cue, Aspiration of one consistency with weak or no reflexive cough, Laryngeal penetration to the vocal cords with cough with two consistencies, Laryngeal penetration to the vocal cords without cough on one consistency    EVIDENCE FOR LARYNGEAL PENETRATION AND/OR ASPIRATION:  No evidence of aspiration  Laryngeal penetration evident with thin barium    PENETRATION-ASPIRATION SCALE (PAS): Thin Liquids: 5 = Material enters the airway, contacts the vocal folds, and is not ejected from the airway  Mildly Thick Liquids:  1 = Material does not enter the airway  Puree:  1 = Material does not enter the airway  Soft Solid:  1 = Material does not enter the airway    ESOPHAGEAL PHASE:   No significant findings    ATTEMPTED TECHNIQUES:  Small Bolus Size Effective    Straw Ineffective *lack of intra-oral pressure for consistent extraction    Cup Effective *efficacious with mildly thick barium   Large Drinks Not Attempted    Consecutive Drinks Not Attempted    Chin Tuck Not Attempted    Head Turn Not Attempted    Spoon Presentations Effective    Volitional Cough Not Attempted    Spontaneous Cough Not Attempted           DIAGNOSTIC IMPRESSIONS:  Patient presents with moderate oral dysphagia, mild-moderate pharyngeal dysphagia as evidenced by skilled clinical findings outlined above.  Current level of dysphagia is compounded d/t extent of acute intracranial findings to result in marked L facial droop and subsequent lingual body weakness; lethargy levels additionally contributing to findings at date. Inadequate+weakened bilabial seal exhibited with delayed and uncoordinated attempts at manipulation of presented boli to impact efficiency for AP transit. Deficits exhibited for cohesive bolus formation given presence of mod oral residuals dominating in L buccal cavity (clearance of stasis post instrumental evaluation via manual extraction from toothette). Further impairments for intra-oral pressure given patient with inability to generate strength for intake of trials via straw modality. Decreased control/containment with all presented boli with premature spillage to the level of the valleculae+pyriform regions. Swallow onset mild-moderately delayed with weakness for hyolaryngeal elevation+anterior excursion to negatively influence epiglottic inversion for airway protection. Min-mod residuals along BOT (deficits for TBR) with clearance obtained with liquid assist.     Deep laryngeal penetration occurring during the swallow with thin viscous with depth contacting VFs with ejection of contrast not achieved. No direct observations for tracheal aspiration; however, concerns for potential adverse events throughout entirety of meal consumption given controlled/limited study. Recommendations for initiation of puree diet, mildly thick liquids with fluid intake via cup and direct 1:1 assistance for meal consumption. Ongoing dysphagia interventions to be prioritized.      Strategies:  Full Upright Position, Small Bite/Sip, No Straw, Multiple Swallow, Pulmonary Monitoring, Oral Care after all Meals, Medications Crushed with Puree, Direct 1:1 Supervision, Alternate Solids and Liquids, Limit Distractions, and Monitor for Fatigue   Rehabilitation Potential: good  Discharge Recommendations: Continue to Assess Pending Progress    EDUCATION:  Learner: Patient  Education:  Reviewed results and recommendations of this evaluation, Reviewed diet and strategies, Reviewed ST goals and Plan of Care, and Reviewed recommendations for follow-up  Evaluation of Education: Demonstrates with assistance, Needs further instruction, and Family not present    PLAN:  Skilled SLP intervention on acute care 3-5 x per week or until goals met and/or pt plateaus in function. Specific interventions for next session may include: dysphagia management, speech production, cognitive rehabilitation. PATIENT GOAL:    Did not state. Will further assess during treatment. SHORT TERM GOALS:  Short Term Goals  Time Frame for Short Term Goals: 2 weeks  Goal 1: Patient will safely consume puree diet, mildly thick liquids (cup intake for fluids; advanced textures as clinically indicated) and engage in oropharyngeal strengthening+thermal gustatory program to improve oral phase weakness, airway protection, and timeliness for pharyngeal onset trigger. Goal 2: Patient will engage in structured speech drills/tasks (DDK rates, multi-syllabic word repetition, core functional phrases) with implementation of SOS speech strategies to improve intelligibility to 50% in known contexts to optimize speech production skills for listener comprehension. Goal 3: Patient will complete functional immediate/delayed recall tasks with 70% accuracy, mod cues to improve retention of pertinent information. Goal 4: Patient will complete problem solving, verbal/visual reasoning, and executive functioning tasks (time, money/math/finances, medications) with 70% accuracy, mod cues to improve contributions within ADL/IADL requirements. Goal 5: Patient will complete sequencing and thought organization tasks with 70% accuracy, mod cues to improve mental flexibility for daily living scenarios.       LONG TERM GOALS:  No LTG established given short CHRISTELLE Patel M.A., [facility]

## 2022-12-14 NOTE — PLAN OF CARE
Problem: Neurosensory - Adult  Goal: Achieves stable or improved neurological status  12/13/2022 2145 by Jose Angel Huston RN  Outcome: Progressing  Flowsheets (Taken 12/13/2022 2145)  Achieves stable or improved neurological status:   Assess for and report changes in neurological status   Initiate measures to prevent increased intracranial pressure   Maintain blood pressure and fluid volume within ordered parameters to optimize cerebral perfusion and minimize risk of hemorrhage   Monitor temperature, glucose, and sodium. Initiate appropriate interventions as ordered  12/13/2022 1527 by Morena Hill RN  Outcome: Not Progressing  Flowsheets (Taken 12/13/2022 1527)  Achieves stable or improved neurological status:   Assess for and report changes in neurological status   Maintain blood pressure and fluid volume within ordered parameters to optimize cerebral perfusion and minimize risk of hemorrhage   Initiate measures to prevent increased intracranial pressure   Monitor temperature, glucose, and sodium. Initiate appropriate interventions as ordered  12/13/2022 0831 by Court Olmstead RN  Outcome: Progressing  Flowsheets  Taken 12/13/2022 0000 by Court Olmstead RN  Achieves stable or improved neurological status:   Assess for and report changes in neurological status   Initiate measures to prevent increased intracranial pressure   Maintain blood pressure and fluid volume within ordered parameters to optimize cerebral perfusion and minimize risk of hemorrhage  Taken 12/12/2022 2000 by Gutierrez Carias RN  Achieves stable or improved neurological status:   Assess for and report changes in neurological status   Initiate measures to prevent increased intracranial pressure   Maintain blood pressure and fluid volume within ordered parameters to optimize cerebral perfusion and minimize risk of hemorrhage   Monitor temperature, glucose, and sodium.  Initiate appropriate interventions as ordered  Goal: Achieves maximal functionality and self care  12/13/2022 2145 by Rose Marie Ayala RN  Outcome: Progressing  Flowsheets (Taken 12/13/2022 2145)  Achieves maximal functionality and self care:   Monitor swallowing and airway patency with patient fatigue and changes in neurological status   Encourage and assist patient to increase activity and self care with guidance from physical therapy/occupational therapy   Encourage visually impaired, hearing impaired and aphasic patients to use assistive/communication devices  12/13/2022 1527 by Latha Magaña RN  Outcome: Not Progressing  Flowsheets (Taken 12/13/2022 1527)  Achieves maximal functionality and self care:   Monitor swallowing and airway patency with patient fatigue and changes in neurological status   Encourage and assist patient to increase activity and self care with guidance from physical therapy/occupational therapy   Encourage visually impaired, hearing impaired and aphasic patients to use assistive/communication devices  12/13/2022 0831 by Moshe Whelan RN  Outcome: Progressing  Flowsheets  Taken 12/13/2022 0000 by Moshe Whelan RN  Achieves maximal functionality and self care: Monitor swallowing and airway patency with patient fatigue and changes in neurological status  Taken 12/12/2022 2000 by Kane Rutherford RN  Achieves maximal functionality and self care:   Monitor swallowing and airway patency with patient fatigue and changes in neurological status   Encourage and assist patient to increase activity and self care with guidance from physical therapy/occupational therapy   Encourage visually impaired, hearing impaired and aphasic patients to use assistive/communication devices     Care plan reviewed with patient and family. Patient and family verbalize understanding of the plan of care and contribute to goal setting.

## 2022-12-14 NOTE — PROGRESS NOTES
[address] [place] [address]  INPATIENT PHYSICAL THERAPY  DAILY NOTE  [facility]    Time In: 1401  Time Out: 1427  Timed Code Treatment Minutes: 26 Minutes  Minutes: 26          Date: 2022  Patient Name: Edy Vela,  Gender:  female        MRN: [id number]  : 1960  (65 y.o.)     Referring Practitioner: Josesito Watt PA-C  Diagnosis: Acute CVA  Additional Pertinent Hx: Edy Vela who is a 65 y.o. female with a history of strokes with reported residual left sided weakness, DM2, HLD, HTN who presented to [facility] ED this morning as an EMS activated stroke alert, initiated at 46. Patient comes from a nursing facility who reported last known well at 0925 this am. EMS reports baseline left side weakness and dysarthria secondary to previous stroke with acute worsening of these deficits this morning while working with therapy. EMS reports patient was leaning to the left and neglecting her left visual field. Initial NIH 6  - partial hemianopia L eye, LUE drift, LLE drift, left sided sensory deficit, dysarthria, intermittent command following. While in CT, patient noted to actively move LUE/LLE anti-gravity, seemingly waxing and waning. CT head negative for acute intracranial abnormalities. CTA head and neck revealed bilateral ICA occlusions. On-call neuro interventionalist, Dr. Dilcia Rodrgiuez, on site in CT and recommended giving TNK and TNK administered 1108 /. MRI shows Moderate-sized acute infarct involving portions of the right frontoparietal junction, the posterior right frontal lobe, the posterior right temporal lobe and the anterior inferior right occipital lobe and subacute infarct involving the superior right occipital lobe. There is some associated laminar necrosis. CTA neck shows Occluded cervical left internal carotid artery, there is abrupt cut off; occluded right ICA;  Absent A1 and A2 segments of left anterior cerebral artery; patent but small left middle cerebral artery and proximal branches; possible occlusion of right subclavian artery at its origin (this is not well seen); proximal right vertebral artery is not seen (this may also be occluded). Xray L shoulder negative for fracture. Prior Level of Function:  Type of Home: Facility  Home Layout: One level  Home Equipment: Walker, rolling        ADL Assistance: Needs assistance  Ambulation Assistance: Needs assistance  Transfer Assistance: Needs assistance  Active : No  Additional Comments: Prior to recent hospitalization for CVA and transfer to [facility] for rehab, pt had been living at home alone, independent with all ADL/IADL and mobility. Pt had been at [facility] for rehab since 12/2, was walking to/from bathroom with staff utilizing RW per family report. Restrictions/Precautions:  Restrictions/Precautions: General Precautions, Fall Risk  Position Activity Restriction  Other position/activity restrictions: L hemianopsia, L neglect, pusher     SUBJECTIVE: RN approved session, pt is supine in bed, restless, lethargic, noted L facial droop, worsened dysarthria (unable to understand speech at times), unable to open R eyelid. Pt oriented x 4, denies pain at rest, then c/o L shoulder pain when rolling and pt tearful. Difficulty following commands for LE therex, spontaneously moving both LE's erratically.     PAIN: denies initially, then c/o L shoulder pain when rolling, tearful    Vitals: Vitals not assessed per clinical judgement, see nursing flowsheet    OBJECTIVE:  Bed Mobility:  Rolling to Left: Minimal Assistance, X 1, with head of bed flat, with rail, with verbal cues    Supine to Sit: Maximum Assistance, X 1, with head of bed flat, with rail, with verbal cues , with increased time for completion; 2 attempts to complete transfer due to pt  Sit to Supine: Maximum Assistance, X 1, with head of bed flat, with rail     Balance:  Static Sitting Balance:  Dependent, X 1; pushing hard L resistance to tactile cues; sits EOB <20 seconds    Exercise:  Patient was guided in 1 set(s) 10 reps of exercise to both lower extremities. Ankle pumps and Bridges. Exercises were completed for increased independence with functional mobility. Pt unable to follow direction when cued for LE AROM, moving R LE spontaneously and L LE with impaired coordination. Able to place B LE's in hooklying after max verbal cues with tactile cues to maintain, pt performs bridges x 10 reps, cues to slow down and for technique. Pt able to perform ankle pumps, cues to slow down. Functional Outcome Measures: Completed  AM-PAC Inpatient Mobility without Stair Climbing Raw Score : 8  AM-PAC Inpatient without Stair Climbing T-Scale Score : 30.65    ASSESSMENT:  Assessment: Patient progressing toward established goals. Activity Tolerance:  Patient tolerance of  treatment: fair. Equipment Recommendations:Equipment Needed: No  Discharge Recommendations: Subacte/Skilled Nursing Facility  Plan: Current Treatment Recommendations: Strengthening, Balance training, Functional mobility training, Transfer training, Neuromuscular re-education, Safety education & training, Therapeutic activities, Patient/Caregiver education & training  General Plan:  (6x N)    Patient Education  Patient Education: Bed Mobility    Goals:  Patient Goals : get better  Short Term Goals  Time Frame for Short Term Goals: by discharge  Short Term Goal 1: Pt to transfer supine <--> sit min A to enable pt to get in/out of bed. Short Term Goal 2: Pt to sit EOB with SBA for balance for improved core strength in prep for transfers. Short Term Goal 3: Pt to transfer sit <--> stand mod A for increased functional mobility. Short Term Goal 4: PT to assess pivot transfer or ambulation when applicable. Long Term Goals  Time Frame for Long Term Goals : NA due to short length of stay. Following session, patient left in safe position with all fall risk precautions in place.

## 2022-12-14 NOTE — FLOWSHEET NOTE
12/14/22 1003   Safe Environment   Safety Measures   (Virtual RN rounds complete)   PT lying in bed with eyes closed, no distress noted at this time

## 2022-12-14 NOTE — PROGRESS NOTES
Patient/family has been educated on their personal risk factors of:  xHypertension  xPrevious stroke  xHyperlipidemia  xCarotid Artery stenosis  xDiabetes    Treatment for stroke includes:  Risk factor modifications  Following the medication regime prescribed by physician      Educated on FAST-Face-Arm-Speech-Time    A stroke is a brain attack. Stroke is a brain injury. It occurs when the brain's blood supply is interrupted. Blood carries oxygen and nutrients to the brain. Without oxygen and nutrients from blood, brain tissue starts to die rapidly. This can happen in less than 10 minutes. A stroke occurs when blood flow to the brain is blocked (called ischemic stroke). This is caused by one of the following:   Sudden decreased blood flow   Damage to a blood vessel supplying blood to the brain can occur suddenly from either:   Injury   A clot that forms and breaks off from another part of the body (such as the heart or neck)   There are certain conditions which predispose people to form blood clots, such as:   Cancer   Pregnancy   Atrial fibrillation   Certain autoimmune diseases   Local blood clot   A build-up of fatty substances ( atherosclerotic plaque ) along the inner lining of the artery causes:   Narrowing of artery   Reduced elasticity   Local inflammation   Blood protein defects leading to increased clotting tendency   Decreased blood flow in the artery   Clot in an artery supplying the brain   Inflammatory conditions in the blood vessels (vasculitis)   A stroke may also occur if a blood vessel breaks and bleeds into or around the brain. This is called hemorrhagic stroke. This condition needs to be monitored closely. Be sure to keep all appointments. Have exams and blood tests done as directed. Call 911 If Any of the Following Occurs   It is important that you and those around you know the warning signs for stroke.  CALL 911 immediately if you have any of the following which may suggest a new stroke:   Sudden weakness or numbness of face, arm, or leg, especially on one side of the body   Sudden confusion   Sudden trouble speaking or understanding   Sudden trouble seeing in one or both eyes   Sudden dizziness, trouble walking, loss of balance, or coordination   Sudden severe headache with no known cause   If you think you have an emergency, CALL 911       To help reduce your risk of stroke, take the following steps:   Eat a well-balanced diet. The DASH diet rich in fruits, vegetables and low-fat dairy foods, and low in saturated fat, total fat, and cholesterolmay help keep your blood pressure in the healthy range. Exercise regularly. Maintain a healthy weight. (Your body mass index should be below 25.)   If you smoke, quit . Drink alcohol in moderation. Moderate is two or fewer drinks per day for men and one or fewer drinks per day for women and older adults. Control your diabetes    All patient/family questions were answered and teach back method was utilized. Stroke Folder given.    What is Stroke/CVA  Signs and Symptoms of stroke (BEFAST)  Treatments for Stroke  Personal Risk Factors for Stroke discussed  Education--Call 911

## 2022-12-15 LAB
ANION GAP SERPL CALCULATED.3IONS-SCNC: 13 MEQ/L (ref 8–16)
BUN BLDV-MCNC: 9 MG/DL (ref 7–22)
CALCIUM SERPL-MCNC: 10.1 MG/DL (ref 8.5–10.5)
CHLORIDE BLD-SCNC: 102 MEQ/L (ref 98–111)
CO2: 27 MEQ/L (ref 23–33)
CREAT SERPL-MCNC: 0.7 MG/DL (ref 0.4–1.2)
ERYTHROCYTE [DISTWIDTH] IN BLOOD BY AUTOMATED COUNT: 14.7 % (ref 11.5–14.5)
ERYTHROCYTE [DISTWIDTH] IN BLOOD BY AUTOMATED COUNT: 51 FL (ref 35–45)
GFR SERPL CREATININE-BSD FRML MDRD: > 60 ML/MIN/1.73M2
GLUCOSE BLD-MCNC: 141 MG/DL (ref 70–108)
GLUCOSE BLD-MCNC: 148 MG/DL (ref 70–108)
GLUCOSE BLD-MCNC: 149 MG/DL (ref 70–108)
GLUCOSE BLD-MCNC: 159 MG/DL (ref 70–108)
GLUCOSE BLD-MCNC: 159 MG/DL (ref 70–108)
HCT VFR BLD CALC: 40.5 % (ref 37–47)
HEMOGLOBIN: 12.6 GM/DL (ref 12–16)
MAGNESIUM: 1.6 MG/DL (ref 1.6–2.4)
MCH RBC QN AUTO: 29.4 PG (ref 26–33)
MCHC RBC AUTO-ENTMCNC: 31.1 GM/DL (ref 32.2–35.5)
MCV RBC AUTO: 94.4 FL (ref 81–99)
ORGANISM: ABNORMAL
PLATELET # BLD: 220 THOU/MM3 (ref 130–400)
PMV BLD AUTO: 10.8 FL (ref 9.4–12.4)
POTASSIUM SERPL-SCNC: 3.8 MEQ/L (ref 3.5–5.2)
RBC # BLD: 4.29 MILL/MM3 (ref 4.2–5.4)
SODIUM BLD-SCNC: 142 MEQ/L (ref 135–145)
URINE CULTURE REFLEX: ABNORMAL
WBC # BLD: 7.2 THOU/MM3 (ref 4.8–10.8)

## 2022-12-15 PROCEDURE — 97112 NEUROMUSCULAR REEDUCATION: CPT

## 2022-12-15 PROCEDURE — 6370000000 HC RX 637 (ALT 250 FOR IP): Performed by: STUDENT IN AN ORGANIZED HEALTH CARE EDUCATION/TRAINING PROGRAM

## 2022-12-15 PROCEDURE — 99233 SBSQ HOSP IP/OBS HIGH 50: CPT | Performed by: STUDENT IN AN ORGANIZED HEALTH CARE EDUCATION/TRAINING PROGRAM

## 2022-12-15 PROCEDURE — 80048 BASIC METABOLIC PNL TOTAL CA: CPT

## 2022-12-15 PROCEDURE — 85027 COMPLETE CBC AUTOMATED: CPT

## 2022-12-15 PROCEDURE — 82948 REAGENT STRIP/BLOOD GLUCOSE: CPT

## 2022-12-15 PROCEDURE — 97530 THERAPEUTIC ACTIVITIES: CPT

## 2022-12-15 PROCEDURE — 36415 COLL VENOUS BLD VENIPUNCTURE: CPT

## 2022-12-15 PROCEDURE — 92507 TX SP LANG VOICE COMM INDIV: CPT

## 2022-12-15 PROCEDURE — 2580000003 HC RX 258: Performed by: STUDENT IN AN ORGANIZED HEALTH CARE EDUCATION/TRAINING PROGRAM

## 2022-12-15 PROCEDURE — 6370000000 HC RX 637 (ALT 250 FOR IP): Performed by: PHYSICIAN ASSISTANT

## 2022-12-15 PROCEDURE — 99231 SBSQ HOSP IP/OBS SF/LOW 25: CPT | Performed by: THORACIC SURGERY (CARDIOTHORACIC VASCULAR SURGERY)

## 2022-12-15 PROCEDURE — 2060000000 HC ICU INTERMEDIATE R&B

## 2022-12-15 PROCEDURE — 6360000002 HC RX W HCPCS: Performed by: STUDENT IN AN ORGANIZED HEALTH CARE EDUCATION/TRAINING PROGRAM

## 2022-12-15 PROCEDURE — 83735 ASSAY OF MAGNESIUM: CPT

## 2022-12-15 PROCEDURE — 97110 THERAPEUTIC EXERCISES: CPT

## 2022-12-15 PROCEDURE — 6370000000 HC RX 637 (ALT 250 FOR IP)

## 2022-12-15 PROCEDURE — 6370000000 HC RX 637 (ALT 250 FOR IP): Performed by: SOCIAL WORKER

## 2022-12-15 PROCEDURE — 92526 ORAL FUNCTION THERAPY: CPT

## 2022-12-15 RX ORDER — HYDROXYZINE HYDROCHLORIDE 25 MG/1
25 TABLET, FILM COATED ORAL ONCE
Status: COMPLETED | OUTPATIENT
Start: 2022-12-15 | End: 2022-12-15

## 2022-12-15 RX ORDER — HYDROXYZINE HYDROCHLORIDE 25 MG/1
25 TABLET, FILM COATED ORAL NIGHTLY PRN
Status: DISCONTINUED | OUTPATIENT
Start: 2022-12-15 | End: 2022-12-16

## 2022-12-15 RX ORDER — HYDRALAZINE HYDROCHLORIDE 25 MG/1
25 TABLET, FILM COATED ORAL EVERY 8 HOURS SCHEDULED
Status: DISCONTINUED | OUTPATIENT
Start: 2022-12-15 | End: 2022-12-15

## 2022-12-15 RX ORDER — HYDRALAZINE HYDROCHLORIDE 50 MG/1
50 TABLET, FILM COATED ORAL EVERY 8 HOURS SCHEDULED
Status: DISCONTINUED | OUTPATIENT
Start: 2022-12-15 | End: 2022-12-16

## 2022-12-15 RX ADMIN — TICAGRELOR 90 MG: 90 TABLET ORAL at 08:56

## 2022-12-15 RX ADMIN — SODIUM CHLORIDE, PRESERVATIVE FREE 10 ML: 5 INJECTION INTRAVENOUS at 08:55

## 2022-12-15 RX ADMIN — CEFTRIAXONE SODIUM 1000 MG: 1 INJECTION, POWDER, FOR SOLUTION INTRAMUSCULAR; INTRAVENOUS at 08:51

## 2022-12-15 RX ADMIN — CARVEDILOL 6.25 MG: 6.25 TABLET, FILM COATED ORAL at 08:56

## 2022-12-15 RX ADMIN — PANTOPRAZOLE SODIUM 40 MG: 40 TABLET, DELAYED RELEASE ORAL at 04:31

## 2022-12-15 RX ADMIN — HYDRALAZINE HYDROCHLORIDE 50 MG: 50 TABLET, FILM COATED ORAL at 22:34

## 2022-12-15 RX ADMIN — ESCITALOPRAM OXALATE 10 MG: 10 TABLET ORAL at 08:56

## 2022-12-15 RX ADMIN — ASPIRIN 81 MG: 81 TABLET, CHEWABLE ORAL at 08:56

## 2022-12-15 RX ADMIN — LISINOPRIL 40 MG: 40 TABLET ORAL at 08:56

## 2022-12-15 RX ADMIN — ATORVASTATIN CALCIUM 80 MG: 80 TABLET, FILM COATED ORAL at 20:53

## 2022-12-15 RX ADMIN — TICAGRELOR 90 MG: 90 TABLET ORAL at 20:53

## 2022-12-15 RX ADMIN — ACETAMINOPHEN 650 MG: 325 TABLET ORAL at 08:56

## 2022-12-15 RX ADMIN — AMLODIPINE BESYLATE 10 MG: 10 TABLET ORAL at 08:56

## 2022-12-15 RX ADMIN — ACETAMINOPHEN 650 MG: 325 TABLET ORAL at 20:53

## 2022-12-15 RX ADMIN — HYDROXYZINE HYDROCHLORIDE 25 MG: 25 TABLET, FILM COATED ORAL at 10:08

## 2022-12-15 RX ADMIN — ONDANSETRON 4 MG: 2 INJECTION INTRAMUSCULAR; INTRAVENOUS at 08:56

## 2022-12-15 RX ADMIN — HYDROXYZINE HYDROCHLORIDE 25 MG: 25 TABLET, FILM COATED ORAL at 20:53

## 2022-12-15 RX ADMIN — HYDRALAZINE HYDROCHLORIDE 50 MG: 50 TABLET, FILM COATED ORAL at 15:17

## 2022-12-15 RX ADMIN — Medication 3 MG: at 20:53

## 2022-12-15 ASSESSMENT — PAIN SCALES - GENERAL
PAINLEVEL_OUTOF10: 0

## 2022-12-15 NOTE — PROGRESS NOTES
[address] [id number]  INPATIENT SPEECH THERAPY  [facility] NEUROSCIENCES 4A  DAILY NOTE    TIME   SLP Individual Minutes  Time In: 1086  Time Out: 8466  Minutes: 27  Timed Code Treatment Minutes: 0 Minutes   Dysphagia tx: 16 minutes  Speech/language tx: 11 minutes     Date: 12/15/2022  Patient Name: Harmeet Hughes      CSN: [id number]   : 1960  (65 y.o.)  Gender: female   Referring Physician: Rocio Muro PA-C  Diagnosis: Acute CVA   Precautions: Fall risk, aspiration precautions  Current Diet: Puree diet with mildly thick liquids   Swallowing Strategies: Full Upright Position, Small Bite/Sip, No Straw, Multiple Swallow, Pulmonary Monitoring, Oral Care after all Meals, Medications Crushed with Puree, Direct 1:1 Supervision, Alternate Solids and Liquids, Limit Distractions, and Monitor for Fatigue              Date of Last MBS/FEES:  MBS:     Pain:  No pain reported. Subjective:  Patient seen at bedside, alert and cooperative. Patient did appear with flat affect and restlessness. Participated well with speech therapist.     Short-Term Goals:  SHORT TERM GOAL #1:  Goal 1: Patient will safely consume puree diet, mildly thick liquids (cup intake for fluids; advanced textures as clinically indicated) and engage in oropharyngeal strengthening+thermal gustatory program to improve oral phase weakness, airway protection, and timeliness for pharyngeal onset trigger. INTERVENTIONS:PADMINI Mir reporting, \"patient did ok with breakfast, doesn't care for it therefore limited PO intake overall. \" Patient did ask for her face to be wiped d/t anterior spillage to lips and chin from impaired left labial seal secondary to left side facial droop/weakness. Provided direct 1:1 feeding with lunch meal; patient consumed 1/2 puree corn, 1/2 puree pasta + sauce and mildly thick liquids via cup.  Patient demonstrated with impaired mastication with decreased bolus formation; although, appropriate and functional provided additional time. Anterior spillage noted from labial seal secondary to left side facial droop/weakness; ST provided patient with wash cloth patient with multiple independent attempts to clear material from face indicating fair sensation overall, even on impaired side. Patient with increasing need for rest breaks as evidence by intermittent periods of deep breathing. Patient with delayed coughing and increasing breath work. ST obtained vital stick and gathered patient's O2 levels; O2 level noted to be 90. ST provided patient with additional rest breaks and encouragement of \"extra dry swallows. \" Patient's O2 levels then increased to 94. ST provided verbal cue to patient to \"utilize tongue to sweep left cheek. \" Patient independently utilized finger sweep; no left oral residue noted. Lunch meal paused d/t patient benefiting from rest breaks. Patient with certainly remain at high risk for aspiration; ST unable to fully evaluate pharyngeal function at bedside alone. ST with concerns for potential need for supplemental oxygen when completing basic ADL/therapeutic tasks to improve performance. PADMINI Mir notified. Recommend continued puree diet with mildly thick liquids + STRICT use of compensatory strategies (Full Upright Position, Small Bite/Sip, No Straw, Multiple Swallow, Pulmonary Monitoring, Oral Care after all Meals, Medications Crushed with Puree, Direct 1:1 Supervision, Alternate Solids and Liquids, Limit Distractions, and Monitor for Fatigue) + provide rest breaks and provide wash cloth towel for patient with each meal.     [facility] reporting \"I will listen to her lung sounds. \" Should pulmonary concerns present; may consider completion of FEES to evaluate throughout entire meal with focus on endurance and potential risk for swallowing decompensation.      *post evaluation, patient without respiratory distress upon leaving room; PADMINI Mir notified re: clinical findings and recommendations from the assessment; verbal receptiveness noted     SHORT TERM GOAL #2:  Goal 2: Patient will engage in structured speech drills/tasks (DDK rates, multi-syllabic word repetition, core functional phrases) with implementation of SOS speech strategies to improve intelligibility to 50% in known contexts to optimize speech production skills for listener comprehension. INTERVENTIONS:Patient with severe dysarthria; poor ability to complete appropriate intra oral pressure and labial seal/pressure to complete bilabial sounds. Patient completed repetition of basic words beginning with /p/ and /b/- fair intelligibility; max cues provided to improve articulatory precision/oral opening. SHORT TERM GOAL #3:  Goal 3: Patient will complete functional immediate/delayed recall tasks with 70% accuracy, mod cues to improve retention of pertinent information. INTERVENTIONS: DNT secondary to focus on dysphagia and speech tx    SHORT TERM GOAL #4:  Goal 4: Patient will complete problem solving, verbal/visual reasoning, and executive functioning tasks (time, money/math/finances, medications) with 70% accuracy, mod cues to improve contributions within ADL/IADL requirements. INTERVENTIONS:DNT secondary to focus on dysphagia and speech tx    SHORT TERM GOAL #5:  Goal 5: Patient will complete sequencing and thought organization tasks with 70% accuracy, mod cues to improve mental flexibility for daily living scenarios.   INTERVENTIONS:DNT secondary to focus on dysphagia and speech tx    Long-Term Goals: No LTGs due to short ELOS          EDUCATION:  Learner: Patient  Education:  Reviewed results and recommendations of this evaluation, Reviewed diet and strategies, Reviewed signs, symptoms and risks of aspiration, Reviewed ST goals and Plan of Care, Reviewed recommendations for follow-up, and Education Related to Potential Risks and Complications Due to Impairment/Illness/Injury  Evaluation of Education: Verbalizes understanding, Needs further instruction, No evidence of learning, and Family not present    ASSESSMENT/PLAN:  Activity Tolerance:  Patient tolerance of  treatment: fair. Assessment/Plan: Patient progressing toward established goals. Continues to require skilled care of licensed speech pathologist to progress toward achievement of established goals and plan of care. .     Plan for Next Session: PO intake; puree and mildly thick liquids, ? FEES  Discharge Recommendations: SNF     LUISA Montero 23

## 2022-12-15 NOTE — CARE COORDINATION
12/15/22, 12:27 PM EST    DISCHARGE ON GOING EVALUATION    Heidy Hernandez day: 3  Location: -07/007-A Reason for admit: Acute CVA (cerebrovascular accident) ([facility] [address] [I63.9]  Abnormal QT interval present on electrocardiogram [R94.31]  Cerebrovascular accident (CVA), unspecified mechanism ([address] [I63.9]   Procedure:   12/12 CT Head: No acute findings; Stable appearing old infarcts in the left frontal lobe, both occipital lobes and a portion of the left parietal lobe; Small old infarct in the medial aspect of the right basal ganglia  12/12 CTA Head/Neck: Occluded cervical left internal carotid artery. There is an abrupt cut off. This may be an acute occlusion. This cavernous sinus segment is diminutive; Occluded right internal carotid artery. There is a normal cavernous sinus segment; Absent A1 and A2 segments of the left anterior cerebral artery; Patent but small left middle cerebral artery and proximal branches; Possible occlusion of the right subclavian artery at its origin. This is not well seen. The proximal right vertebral artery is not seen. This may also be occluded  12/13 MRI Brain: Moderate-sized acute infarct involving portions of the right frontoparietal junction, the posterior right frontal lobe, the posterior right temporal lobe and the anterior inferior right occipital lobe; Subacute infarct involving the superior right occipital lobe. There is some associated laminar necrosis; Old infarcts in the left frontal and parietal lobes  12/13 CT Head: Developing subacute right parietotemporal infarct  12/14 MBS: Puree diet, mildly thick liquids. Barriers to Discharge: PT/OT/ST, Neurology following, Palliative Care Eval, Urine (+) E. Coli, CVS consulted for occluded ICA. Asa, Lipitor, IV Rocephin, diabetes management, Protonix, Brilinta. PCP: Radha Santo DO  Readmission Risk Score: 17.6%  Patient Goals/Plan/Treatment Preferences: Plan is return to [facility]. SW following. Heather initiated.

## 2022-12-15 NOTE — PLAN OF CARE
Problem: Discharge Planning  Goal: Discharge to home or other facility with appropriate resources  12/14/2022 2120 by Reinaldo Villalpando RN  Outcome: Progressing  Flowsheets (Taken 12/14/2022 2120)  Discharge to home or other facility with appropriate resources:   Identify barriers to discharge with patient and caregiver   Arrange for needed discharge resources and transportation as appropriate   Identify discharge learning needs (meds, wound care, etc)   Arrange for interpreters to assist at discharge as needed   Refer to discharge planning if patient needs post-hospital services based on physician order or complex needs related to functional status, cognitive ability or social support system     Problem: Neurosensory - Adult  Goal: Achieves stable or improved neurological status  12/14/2022 2120 by Reinaldo Villalpando RN  Outcome: Progressing  Flowsheets (Taken 12/14/2022 2120)  Achieves stable or improved neurological status:   Assess for and report changes in neurological status   Initiate measures to prevent increased intracranial pressure   Maintain blood pressure and fluid volume within ordered parameters to optimize cerebral perfusion and minimize risk of hemorrhage   Monitor temperature, glucose, and sodium.  Initiate appropriate interventions as ordered     Problem: Neurosensory - Adult  Goal: Achieves maximal functionality and self care  12/14/2022 2120 by Reinaldo Villalpando RN  Outcome: Progressing  Flowsheets (Taken 12/14/2022 2120)  Achieves maximal functionality and self care:   Monitor swallowing and airway patency with patient fatigue and changes in neurological status   Encourage and assist patient to increase activity and self care with guidance from physical therapy/occupational therapy   Encourage visually impaired, hearing impaired and aphasic patients to use assistive/communication devices     Problem: Pain  Goal: Verbalizes/displays adequate comfort level or baseline comfort level  12/14/2022 2120 by Court Elliott RN  Outcome: Progressing  Flowsheets (Taken 12/14/2022 2120)  Verbalizes/displays adequate comfort level or baseline comfort level:   Encourage patient to monitor pain and request assistance   Assess pain using appropriate pain scale   Administer analgesics based on type and severity of pain and evaluate response   Implement non-pharmacological measures as appropriate and evaluate response   Consider cultural and social influences on pain and pain management     Problem: Safety - Adult  Goal: Free from fall injury  12/14/2022 2120 by Court Elliott RN  Outcome: Progressing  Flowsheets (Taken 12/14/2022 2120)  Free From Fall Injury:   Instruct family/caregiver on patient safety   Based on caregiver fall risk screen, instruct family/caregiver to ask for assistance with transferring infant if caregiver noted to have fall risk factors     Problem: ABCDS Injury Assessment  Goal: Absence of physical injury  12/14/2022 2120 by Court Elliott RN  Outcome: Progressing  Flowsheets (Taken 12/14/2022 2120)  Absence of Physical Injury: Implement safety measures based on patient assessment     Problem: Respiratory - Adult  Goal: Achieves optimal ventilation and oxygenation  12/14/2022 2120 by Court Elliott RN  Outcome: Progressing  Flowsheets (Taken 12/14/2022 2120)  Achieves optimal ventilation and oxygenation:   Assess for changes in respiratory status   Assess for changes in mentation and behavior   Position to facilitate oxygenation and minimize respiratory effort   Oxygen supplementation based on oxygen saturation or arterial blood gases   Initiate smoking cessation protocol as indicated   Encourage broncho-pulmonary hygiene including cough, deep breathe, incentive spirometry   Assess the need for suctioning and aspirate as needed   Assess and instruct to report shortness of breath or any respiratory difficulty   Respiratory therapy support as indicated     Problem: Cardiovascular - Adult  Goal: Maintains optimal cardiac output and hemodynamic stability  12/14/2022 2120 by Rusty Rose RN  Outcome: Progressing  Flowsheets (Taken 12/14/2022 2120)  Maintains optimal cardiac output and hemodynamic stability:   Monitor blood pressure and heart rate   Monitor urine output and notify Licensed Independent Practitioner for values outside of normal range   Assess for signs of decreased cardiac output     Problem: Skin/Tissue Integrity - Adult  Goal: Skin integrity remains intact  12/14/2022 2120 by Rusty Rose RN  Outcome: Progressing  Flowsheets (Taken 12/14/2022 2120)  Skin Integrity Remains Intact: Monitor for areas of redness and/or skin breakdown     Problem: Skin/Tissue Integrity - Adult  Goal: Oral mucous membranes remain intact  12/14/2022 2120 by Rusty Rose RN  Outcome: Progressing  Flowsheets (Taken 12/14/2022 2120)  Oral Mucous Membranes Remain Intact:   Assess oral mucosa and hygiene practices   Implement preventative oral hygiene regimen   Implement oral medicated treatments as ordered     Problem: Gastrointestinal - Adult  Goal: Maintains adequate nutritional intake  12/14/2022 2120 by Rusty Rose RN  Outcome: Progressing  Flowsheets (Taken 12/14/2022 2120)  Maintains adequate nutritional intake:   Monitor percentage of each meal consumed   Identify factors contributing to decreased intake, treat as appropriate   Assist with meals as needed   Monitor intake and output, weight and lab values   Obtain nutritional consult as needed     Problem: Metabolic/Fluid and Electrolytes - Adult  Goal: Glucose maintained within prescribed range  12/14/2022 2120 by Rusty Rose RN  Outcome: Progressing  Flowsheets (Taken 12/14/2022 2120)  Glucose maintained within prescribed range:   Monitor blood glucose as ordered   Assess for signs and symptoms of hyperglycemia and hypoglycemia   Administer ordered medications to maintain glucose within target range   Assess barriers to adequate nutritional intake and initiate nutrition consult as needed   Instruct patient on self management of diabetes and initiate consult as needed     Care plan reviewed with patient and family. Patient and family verbalize understanding of the plan of care and contribute to goal setting.

## 2022-12-15 NOTE — PROGRESS NOTES
[address] [id number]  INPATIENT PHYSICAL THERAPY  DAILY NOTE  [facility] 4A - 4A-07/007-A      Time In: 0570  Time Out: 0932  Timed Code Treatment Minutes: 38 Minutes  Minutes: 38      Co-tx with OT due to multiple complexity and needing 2 skilled hands for sitting edge of bed     Date: 12/15/2022  Patient Name: Jennifer Banks,  Gender:  female        MRN: [id number]  : 1960  (65 y.o.)     Referring Practitioner: Gwen Early PA-C  Diagnosis: Acute CVA  Additional Pertinent Hx: Jennifer Banks who is a 65 y.o. female with a history of strokes with reported residual left sided weakness, DM2, HLD, HTN who presented to [facility] ED this morning as an EMS activated stroke alert, initiated at 46. Patient comes from a nursing facility who reported last known well at 0925 this am. EMS reports baseline left side weakness and dysarthria secondary to previous stroke with acute worsening of these deficits this morning while working with therapy. EMS reports patient was leaning to the left and neglecting her left visual field. Initial NIH 6  - partial hemianopia L eye, LUE drift, LLE drift, left sided sensory deficit, dysarthria, intermittent command following. While in CT, patient noted to actively move LUE/LLE anti-gravity, seemingly waxing and waning. CT head negative for acute intracranial abnormalities. CTA head and neck revealed bilateral ICA occlusions. On-call neuro interventionalist, Dr. Alyx Murphy, on site in CT and recommended giving TNK and TNK administered 1108 . MRI shows Moderate-sized acute infarct involving portions of the right frontoparietal junction, the posterior right frontal lobe, the posterior right temporal lobe and the anterior inferior right occipital lobe and subacute infarct involving the superior right occipital lobe. There is some associated laminar necrosis. CTA neck shows Occluded cervical left internal carotid artery, there is abrupt cut off; occluded right ICA;  Absent A1 and A2 segments of left anterior cerebral artery; patent but small left middle cerebral artery and proximal branches; possible occlusion of right subclavian artery at its origin (this is not well seen); proximal right vertebral artery is not seen (this may also be occluded). Xray L shoulder negative for fracture. Prior Level of Function:  Type of Home: Facility  Home Layout: One level  Home Equipment: Walker, rolling        ADL Assistance: Needs assistance  Ambulation Assistance: Needs assistance  Transfer Assistance: Needs assistance  Active : No  Additional Comments: Prior to recent hospitalization for CVA and transfer to [facility] for rehab, pt had been living at home alone, independent with all ADL/IADL and mobility. Pt had been at [facility] for rehab since 12/2, was walking to/from bathroom with staff utilizing RW per family report.     Restrictions/Precautions:  Restrictions/Precautions: General Precautions, Fall Risk  Position Activity Restriction  Other position/activity restrictions: L hemianopsia, L neglect, pusher       SUBJECTIVE: nursing ok'd therapy, pt in bed on arrival and agreeable for therapy- pt slow to respond but was able to follow directions for ex - she cont to demonstrated left neglect and poor midline awareness     PAIN: pt c/o of pain in left shoulder     Vitals: Nurse checked vitals prior to session    OBJECTIVE:  Bed Mobility:  Rolling to Right: Maximum Assistance, X 2   Supine to Sit: Maximum Assistance, X 2  Sit to Supine: Maximum Assistance, X 2   Scooting: Maximum Assistance, X 2    Balance:  Pt sat edge of bed with assist x 2 persons she needed max assist of one and assist of a second person to help keep feet in proper position and for awareness and scanning to midline and left of midline   - pt pushing post and to the left most of session and needed assist x 2 persons frequently to reposition right hip back onto the bed- attempted use of mirror however pt demonstrated difficulty scanning to the mirror to help with her midline awareness, attempted to side prop and displace right UE distally from her body but these were for very short times - OT working on scanning and having pt reach w/ right UE     Exercise:  Patient was guided in 1 set(s) 10 reps of exercise to both lower extremities. Ankle pumps. she demonstrated partial range at left but needed many cues, heelsldies with max assist for alignment she was able to flex at hip but severely ER, short arc quads with cues to control descend and cues for full range cues to hit a target but she wasn't looking at her foot, lower trunk rotations with stretches, stretched left hip in IR and left heelcord   Exercises were completed for increased independence with functional mobility. Functional Outcome Measures: Completed  AM-PAC Inpatient Mobility without Stair Climbing Raw Score : 8  AM-PAC Inpatient without Stair Climbing T-Scale Score : 30.65    ASSESSMENT:  Assessment: Patient progressing toward established goals. Pt demonstrated generalized weakness in left south body along with decreased balance, endurance and safety awareness. Pt would greatly benefit from cont skilled therapy prior to discharge home to improve functional independence and safety with functional mobility. Activity Tolerance:  Patient tolerance of  treatment: fair.         Equipment Recommendations:Equipment Needed: No  Discharge Recommendations: Inpatient Therapy Stay  Plan: Current Treatment Recommendations: Strengthening, Balance training, Functional mobility training, Transfer training, Neuromuscular re-education, Safety education & training, Therapeutic activities, Patient/Caregiver education & training  General Plan:  (6x N)    Patient Education  Patient Education: Plan of Care    Goals:  Patient Goals : get better  Short Term Goals  Time Frame for Short Term Goals: by discharge  Short Term Goal 1: Pt to transfer supine <--> sit min A to enable pt to get in/out of bed.  Short Term Goal 2: Pt to sit EOB with SBA for balance for improved core strength in prep for transfers. Short Term Goal 3: Pt to transfer sit <--> stand mod A for increased functional mobility. Short Term Goal 4: PT to assess pivot transfer or ambulation when applicable. Long Term Goals  Time Frame for Long Term Goals : NA due to short length of stay. Following session, patient left in safe position with all fall risk precautions in place.

## 2022-12-15 NOTE — PROGRESS NOTES
Internal Medicine Resident Progress Note    Patient:  Minh Hernandez    YOB: 1960  Unit/Bed:4A-07/007-A  MRN: [id number]    Acct: [de-identified]   PCP: Gonzalez Yañez DO    Date of Admission: 12/12/2022      Assessment/Plan:  Ischemic stroke, recurrent on plavix, in both hemispheres. Hx of past CVA in 2010 and NOV2022. Had been recovering from most recent CVA in SNF with significantly worsened symptoms [id number] while on plavix. NIHSS 6 on arrival. Received TNK and admitted to ICU for 24h. MRI positive for acute infarcts to right frontoparietal junction, posterior right frontal region, posterior right temporal region, anterior-inferior occipital region, and a subacute infarct in the right occipital region. CTA noted bilateral ICA occlusions. No definitive intervention planned as risks of intervention unfortunately outweigh benefits. Echo grossly unremarkable. P2Y12 inhibitor test shows resistance to plavix. Neurology following  Continue brilinta 90mg BID + ASA 81mg daily. Recommend smoking cessation  Continue statin  Continue to maintain -180 per neurology. Vascular, Dr. Earl Llanes, consulted for further recommendations regarding ICA occlusions. Palliative consult placed, will discuss goals of care and code status with brother present and after seen by Dr. Earl Llanes. Continue PT/OT. Will benefit by aggressive rehab   ?UTI. Patient initially unable to confirm/deny symptoms d/t communication barrier caused by CVAs, but does not remember whether she had any symptoms of UTI prior to admit. UA shows E. coli, colony count >100K  Finish course of ceftriaxone  Acute hypoxic respiratory failure. Remains on 2L vis NC. Likely secondary to CVA. No home O2 needs prior to most recent strokes. Continue supplemental O2, titrate to >90%, wean as tolerated  NIDDM2. On metformin outpatient, currently holding. LD SSI in place, however has not needed insulin during this admit.   Continue to monitor with daily BMP  Dysarthria/dysphagia. Secondary to CVAs. Modified barium study [id number] showed laryngeal penetration of thin barium without evidence of aspiration. Diet per SLP recommendation  Currently recommends pureed diet, mildly thick liquids with intake via cup, and 1-to-1 assistance/supervision during meals. HTN, essential. Was on amlodipine-benazepril 10-20 outpatient. Was not taking atenolol at time of admit. Home meds restarted [id number]. Per neuro, to keep -180, now maintaining in goal on amlodipine 10mg daily and lisinopril 20mg daily. Lisinopril increased to 40mg daily and hydralazine 50mg TID started [id number]. Continue current regimen  Continue to closely monitor BP  Adjust meds as needed to achieve BP goal  Depression with anxiety. Has been on buspar, lexapro, and atarax outpatient in the past, however had only been on lexapro at time of admit. Resume home lexapro and atarax. HLD. Continue high dose statin. GERD. Resume prevacid  Obesity. BMI 34.76. Noted. Expected discharge date:  TBD    Disposition: SNF vs. Rehab  [] Home  [] TCU  [] Rehab  [] Psych  [] SNF  [] [place]  [] Other-    ===================================================================      Chief Complaint: stroke alert     Hospital Course:   Per original HPI:  Luz Carrasquillo is a 65 y.o. female everyday smoker with a PMHx of NIDDM 2, HLD, HTN, GERD, depression, anxiety, asthma, and prior CVA in 2010 with residual left-sided weakness and dysarthria. She presented to [facility] emergency department as a stroke alert with a last known well time on 12/12/2022 at 9:25 AM. She presented with an NIH score of 6. CT of the head was negative for acute intracranial abnormalities. CTA of the head/neck revealed bilateral ICA occlusions. The patient was consulted by neuro interventional and was subsequently administered TNK. She was admitted to the ICU for further observation post administration.     Of note the patient had recently been evaluated here at [facility] on 11/22/2022 with complaint of dizziness. CT head at that time without acute findings and the patient was discharged home. She later presented to [facility] on 11/24/2022 for similar complaints and transferred to [facility]. At this time she was evaluated by interventional neurology where she was admitted from 11/24/22-12/2/2022 for similar symptoms. MRI brain at that time had evidence of large acute to subacute stroke of watershed regions and right occipital lobe. Diagnostic cerebral angiogram completed on 11/28/2022 revealed complete occlusion of the right ICA, left ICA, and right PCA. There was no stent intervention at that time. Patient's brother provided majority of the patient's history and stated that this started all before Thanksgiving of this year. He stated that she had prior strokes but with minimal deficit except some cognitive impairment and was living independently prior to Thanksgiving. The patient was discharged to SNF with ASA, Plavix, high intensity statin. \"     [id number] - Ongoing blood pressure management required. Subjective (past 24 hours):   Patient continues to answer most questions by nodding/shaking her head, but is able to respond verbally when prompted albeit with significant dysarthria. Per nodding/shaking her head and occasional verbal answers, she indicates that she does not have any acute complaints but does not feel well overall without significant changes from yesterday. Does not remember if she had any symptoms of UTI prior to this admission. ROS: reviewed complete ROS unchanged unless otherwise stated in hospital course/subjective portion.        Medications:  Reviewed    Infusion Medications    dextrose      sodium chloride       Scheduled Medications    hydrALAZINE  50 mg Oral 3 times per day    ticagrelor  90 mg Oral BID    aspirin  81 mg Oral Daily    lisinopril  40 mg Oral Daily    cefTRIAXone (ROCEPHIN) IVPB in 50 mL NS  1,000 mg IntraVENous Daily    escitalopram  10 mg Oral Daily    pantoprazole  40 mg Oral QAM AC    amLODIPine  10 mg Oral Daily    atorvastatin  80 mg Oral Nightly    insulin lispro  0-4 Units SubCUTAneous TID WC    insulin lispro  0-4 Units SubCUTAneous Nightly    sodium chloride flush  5-40 mL IntraVENous 2 times per day     PRN Meds: hydrOXYzine HCl, melatonin, glucose, dextrose bolus **OR** dextrose bolus, glucagon (rDNA), dextrose, sodium chloride flush, sodium chloride, ondansetron **OR** ondansetron, polyethylene glycol, acetaminophen **OR** acetaminophen, promethazine        Intake/Output Summary (Last 24 hours) at 12/15/2022 1731  Last data filed at 12/15/2022 0838  Gross per 24 hour   Intake 460 ml   Output 350 ml   Net 110 ml       Exam:  BP (!) 177/95   Pulse 68   Temp 98.8 °F (37.1 °C) (Oral)   Resp 18   Ht 5' 6\" (1.676 m)   Wt 205 lb 14.4 oz (93.4 kg)   SpO2 91%   BMI 33.23 kg/m²     General: chronically ill appearing  female, lying in bed, eyes closed, but easily rousable and interactive. Follows commands. Eyes:  PERRLA, nonicteric, no conjunctivitis, EOMs appear grossly intact although she had difficulty crossing midline to left. HENT: Normocephalic, atraumatic. Nares normal. Oral mucosa dry. Hearing intact. Neck: Supple, with full range of motion. No gross JVD appreciated. Respiratory:  No extra work of breathing. Clear to auscultation, without rales or wheezes or rhonchi. Cardiovascular: Normal rate, regular rhythm with normal E5/V6 with systolic murmur. Bilateral lower extremity edema. Abdomen: Soft, non-tender, non-distended with normal bowel sounds. Musculoskeletal: No joint swelling or tenderness. Normal tone. No abnormal movements. Skin: Warm and dry. No rashes or lesions. Neurologic:  Alert, follows commands, severely dysarthric. Capillary Refill: Brisk,< 3 seconds. Peripheral Pulses: +2 palpable, equal bilaterally.          Labs:   Recent Labs 12/13/22 0432 12/14/22 0402 12/15/22  0414   WBC 9.0 8.3 7.2   HGB 12.3 11.8* 12.6   HCT 38.5 36.8* 40.5    242 220     Recent Labs     12/13/22 0432 12/14/22 0402 12/15/22  0414    143 142   K 3.6 3.9 3.8    102 102   CO2 32 30 27   BUN 8 9 9   CREATININE 0.6 0.7 0.7   CALCIUM 9.4 9.5 10.1     No results for input(s): AST, ALT, BILIDIR, BILITOT, ALKPHOS in the last 72 hours. No results for input(s): INR in the last 72 hours. No results for input(s): Radha Bigness in the last 72 hours. No results for input(s): PROCAL in the last 72 hours. Lab Results   Component Value Date/Time    NITRU POSITIVE 12/13/2022 01:50 AM    WBCUA 15-25 12/13/2022 01:50 AM    BACTERIA MANY 12/13/2022 01:50 AM    RBCUA 0-2 12/13/2022 01:50 AM    BLOODU TRACE 12/13/2022 01:50 AM    SPECGRAV 1.022 10/17/2019 11:45 AM    SPECGRAV >=1.030 10/17/2019 10:50 AM    GLUCOSEU NEGATIVE 12/13/2022 01:50 AM       Radiology (48 hours):  CTA HEAD W WO CONTRAST (CODE STROKE)    Result Date: 12/12/2022   1. Occluded cervical left internal carotid artery. There is an abrupt cut off. This may be an acute occlusion. This cavernous sinus segment is diminutive. 2. Occluded right internal carotid artery. There is a normal cavernous sinus segment. 3. Absent A1 and A2 segments of the left anterior cerebral artery. 4. Patent but small left middle cerebral artery and proximal branches. 5. Possible occlusion of the right subclavian artery at its origin. This is not well seen. The proximal right vertebral artery is not seen. This may also be occluded. These findings were telephoned to Nata Baeza MD at 11:48 AM on 12/12/2022. Milad Nolasco **This report has been created using voice recognition software. It may contain minor errors which are inherent in voice recognition technology. ** Final report electronically signed by Dr. Ligia Clinton on 12/12/2022 11:50 AM    CT HEAD WO CONTRAST    Result Date: 12/13/2022  Impression: 1.  Developing subacute right parietotemporal infarct. This document has been electronically signed by: Misha Wilson MD on 12/13/2022 01:37 AM All CTs at this facility use dose modulation techniques and iterative reconstructions, and/or weight-based dosing when appropriate to reduce radiation to a low as reasonably achievable. CT HEAD WO CONTRAST    Result Date: 12/12/2022   1. No acute findings. 2. Stable appearing old infarcts in the left frontal lobe, both occipital lobes and a portion of the left parietal lobe. 3. Small old infarct in the medial aspect of the right basal ganglia. These findings were telephoned to Kain Kimble at 11:02 AM on 12/12/2022 . **This report has been created using voice recognition software. It may contain minor errors which are inherent in voice recognition technology. ** Final report electronically signed by Dr. Ligia Clinton on 12/12/2022 11:06 AM    CTA NECK W WO CONTRAST    Result Date: 12/12/2022   1. Occluded cervical left internal carotid artery. There is an abrupt cut off. This may be an acute occlusion. This cavernous sinus segment is diminutive. 2. Occluded right internal carotid artery. There is a normal cavernous sinus segment. 3. Absent A1 and A2 segments of the left anterior cerebral artery. 4. Patent but small left middle cerebral artery and proximal branches. 5. Possible occlusion of the right subclavian artery at its origin. This is not well seen. The proximal right vertebral artery is not seen. This may also be occluded. These findings were telephoned to Nata Baeza MD at 11:48 AM on 12/12/2022. Milad Nolasco **This report has been created using voice recognition software. It may contain minor errors which are inherent in voice recognition technology. ** Final report electronically signed by Dr. Ligia Clinton on 12/12/2022 11:50 AM    XR SHOULDER LEFT (MIN 2 VIEWS)    Result Date: 12/13/2022   1. No evidence of acute osseous abnormality of the left shoulder.  2. Mild degenerative changes of the acromioclavicular joint. **This report has been created using voice recognition software. It may contain minor errors which are inherent in voice recognition technology. ** Final report electronically signed by Dr. Иван Sierra MD on 12/13/2022 4:14 PM    XR CHEST PORTABLE    Result Date: 12/12/2022  1. Borderline cardiomegaly. 2. Atelectasis or scarring at left lung base. 3. Otherwise negative chest x-ray. . **This report has been created using voice recognition software. It may contain minor errors which are inherent in voice recognition technology. ** Final report electronically signed by DR Juan Diego Ryan on 12/12/2022 12:37 PM    MRI brain without contrast    Result Date: 12/13/2022   1. Moderate-sized acute infarct involving portions of the right frontoparietal junction, the posterior right frontal lobe, the posterior right temporal lobe and the anterior inferior right occipital lobe. 2. Subacute infarct involving the superior right occipital lobe. There is some associated laminar necrosis. 3. Old infarcts in the left frontal and parietal lobes. **This report has been created using voice recognition software. It may contain minor errors which are inherent in voice recognition technology. ** Final report electronically signed by Dr. Robert Baires on 12/13/2022 12:02 PM       DVT prophylaxis:    [] Lovenox  [x] SCDs - bleeding risk  [] SQ Heparin  [] Encourage ambulation   [] Already on Anticoagulation       Diet: ADULT DIET; Dysphagia - Pureed; Mildly Thick (Nectar); No Drinking Straws  Code Status: Full Code  PT/OT: Yes  Tele: Yes  IVF: None if PO intake adequate. Electronically signed by Ramirez Fuentes MD, IM-PGY1 on 12/15/2022 at 5:31 PM    Case was discussed with Attending, Dr. Nolvia Dill.

## 2022-12-15 NOTE — PALLIATIVE CARE
Initial Evaluation          Patient:   Mike Lindquist  YOB: 1960  Age:  65 y.o. Room:  [address] [place] [address]  MRN:  [id number]   Acct: [de-identified]    Date of Admission:  12/12/2022 10:42 AM  Date of Service:  12/15/2022  Completed By:  Alfonso Reddy RN                 Reason for Palliative Care Evaluation:-             [x] Code Status Discussion              [] Goals of Care              [] Pain/Symptom Management               [] Emotional Support              [] Other:                   Current Issues:-  []  Pain  []  Fatigue  []  Nausea  []  Anxiety  []  Depression  []  Shortness of Breath  []  Constipation  [x]  Appetite  [x]  Other: recurrent strokes             Advance Directives:-  [] [place] DNR Form  [] Living Will  [] Medical POA             Current Code Status:-  [x] Full Resuscitation  [] DNR-Comfort Care-Arrest  [] DNR-Comfort Care       [] Limited Resuscitation             [] No CPR            [] No shock            [] No ET intubation/reintubation            [] No resuscitative medications            [] Other limitation:              Palliative Performance Status:          [] 60%  Ambulation reduced; Significant disease;Can't do hobbies/housework; intake normal or reduced; occasional assist; LOC full/confusion        [] 50%  Mainly sit/lie; Extensive disease; Can't do any work; Considerable assist; intake normal or reduced; LOC full/confusion        [x] 40%  Mainly in bed; Extensive disease; Mainly assist; intake normal or reduced; LOC full/confusion         [] 30%  Bed Bound; Extensive disease; Total care; intake reduced; LOC full/confusion        [] 20%  Bed Bound; Extensive disease; Total care; intake minimal; Drowsy/coma        [] 10%  Bed Bound; Extensive disease; Total care; Mouth care only; Drowsy/coma        [] 0  Death           Family/Patient Discussion:  Received consult for goals of care.  Per chart review patient has history of CVA 10-15 years ago. Patient was recently admitted to [facility] [address] due to stroke like symptoms 11/25-12/2. Patient returned to [address] [place] [address] from SNF due to stroke like symptoms again. Patient found to have significant bilateral carotid occlusions. Per consult note risks out weigh benefits for any intervention. In room to speak with patient. Patient sitting up in bed, SLP at bedside. Patient able to answer yes and no questions by shaking head. Patient is able to say \"no\" at items. Patient stating she does not have much appetite, and is continuing to have issues swallowing. Patient agreeing that her brother, Diamond Baum, typically visits with her. Patient agreeable for this Rn to return when Diamond Baum is at bedside. This Rn ended conversation to allow patient to work with SLP. Noted after palliative consult was placed, consult was made to Dr Audie Astudillo. Will await further discussion until Dr Audie Astudillo is able to evaluate patient. Plan/Follow-Up:  Will continue to follow.  Will wait to see if there are any recommendations from Dr Audie Astudillo        Electronically signed by Jaquelin Ponce RN on 12/15/2022 at 11:37 AM           Palliative Care Office: [phone]

## 2022-12-15 NOTE — CONSULTS
Tomi Vu is a 65years old female who was admitted with stroke. Medical history include hypertension, DM2, hypertension and hyperlipidemia. I have reviewed the neck CTA. The bilateral internal carotid arteries were found to be occluded. Patient is not indicated for carotid endarterectomy. Please call us again if needed.

## 2022-12-15 NOTE — FLOWSHEET NOTE
12/15/22 0946   Safe Environment   Safety Measures Other (comment)  (Virtual nurse round complete)   No response to audio. Camera turned on to check patient safety. Patient in bed eyes closed. No signs of distress noted at this time.

## 2022-12-15 NOTE — DISCHARGE INSTRUCTIONS
Please document Modified [place] Score on the Barthel Index Scale flow sheet before discharge.   {MODIFIED Harpreet Deluna [id number]}

## 2022-12-15 NOTE — PROGRESS NOTES
[address]  Occupational Therapy  Daily Note  Time:   Time In: 0198  Time Out: 0935  Timed Code Treatment Minutes: 24 Minutes  Minutes: 24      Co-Tx with PT this day secondary to medical complexity and pt inability to tolerate x2 sessions. Date: 12/15/2022  Patient Name: Bailey Irby,   Gender: female      Room: -07/007-A  MRN: [id number]  : 1960  (65 y.o.)  Referring Practitioner: Richa Stark PA-C  Diagnosis: CVA  Additional Pertinent Hx: Per EMR: Bailey Irby who is a 65 y.o. female with a history of strokes with reported residual left sided weakness, DM2, HLD, HTN who presented to [facility] ED this morning as an EMS activated stroke alert, initiated at 46. Patient comes from a nursing facility who reported last known well at 0925 this am. EMS reports baseline left side weakness and dysarthria secondary to previous stroke with acute worsening of these deficits this morning while working with therapy. EMS reports patient was leaning to the left and neglecting her left visual field. Initial NIH 6  - partial hemianopia L eye, LUE drift, LLE drift, left sided sensory deficit, dysarthria, intermittent command following. She is not on current anticoagulation. CT head negative for acute intracranial abnormalities. CTA head and neck revealed bilateral ICA occlusions. On-call neuro interventionalist, Dr. Kalpana Beatty, on site in CT and recommended giving TNK. TNK administered 1108. MRI showed Moderate-sized acute infarct involving portions of the right frontoparietal junction, the posterior right frontal lobe, the posterior right temporal lobe and the anterior inferior right occipital lobe. 2. Subacute infarct involving the superior right occipital lobe.     Restrictions/Precautions:  Restrictions/Precautions: General Precautions, Fall Risk  Position Activity Restriction  Other position/activity restrictions: L hemianopsia, L neglect, pusher     SUBJECTIVE: Nurse approved Tx. PAIN: reports L shoulder pain, brusing noted to L shoulder area     Vitals: Vitals not assessed per clinical judgement, see nursing flowsheet    COGNITION: Slow Processing, Decreased Insight, Decreased Safety Awareness, Impaired Attention, and Difficulty Following Commands, L side neglect     ADL:   No ADL's completed this session. Pink Fred BALANCE:  Sitting Balance:  Maximum Assistance, X 1, with cues for safety, with verbal cues , with increased time for completion. And pt required 2nd person assist to attend to BLE's for proper foot placement on floor to increase sitting posture and balance. Pt demo'd posterior and L lateral pushing. BED MOBILITY:  Supine to Sit: Maximum Assistance, X 2    Sit to Supine: Maximum Assistance, X 2, with head of bed flat    Max A x2 for repositioning. ADDITIONAL ACTIVITIES:  Engaged in dynamic and static sitting at EOB for about x15min. Required max A and CGA/MIN A x1 to maintain UB and LB posture alignment. Pt engaged in dynamic reaching with R UE, constant cuing for L side awareness required. Pt demo'd visual field cut, required max cuing to redirect. Use mirror to assist with midline and increasing L side awareness. Pt required max cuing and redirection for visual scanning. Pt briefly engaged with locating and pointing with RUE to objects around room. ASSESSMENT:     Activity Tolerance:  Patient tolerance of  treatment: fair.         Discharge Recommendations: ECF with OT  Equipment Recommendations: Equipment Needed: No  Other: defer to next level of care  Plan: Times Per Week: 6x  Current Treatment Recommendations: Strengthening, ROM, Balance training, Functional mobility training, Endurance training, Neuromuscular re-education, Safety education & training, Patient/Caregiver education & training, Positioning, Self-Care / ADL    Patient Education  Patient Education: Role of OT and Plan of Care    Goals  Short Term Goals  Time Frame for Short Term Goals: by discharge  Short Term Goal 1: Pt will tolerate sitting at EOB X15 minutes with consistent CGA and moderate cues for midline orientation and awareness to L hemibody/environment, in prep for ADL completion. Short Term Goal 2: Pt will complete grooming/UB ADL with moderate assistance and moderate cuing for awareness to L hemibody to increase independence with self care tasks. Short Term Goal 3: Pt will scan to L side of body/environment with moderate cuing to increase awareness as needed for ADL completion. Short Term Goal 4: Pt will tolerate further assessment of transfers/functional mobility by OTR when appropriate. Following session, patient left in safe position with all fall risk precautions in place.

## 2022-12-15 NOTE — CARE COORDINATION
12/15/22, 1:09 PM EST    DISCHARGE PLANNING EVALUATION    Met with Beverly Oliva this morning. She was somewhat confused. Introduced self and verified plan to return to [facility] at discharge. Helped patient cover up and turn on the TV before leaving. Insurance precert was started yesterday to return to [facility].

## 2022-12-15 NOTE — PALLIATIVE CARE
Follow Up / Progress Note        Patient:   Yessi Pedraza  YOB: 1960  Age:  65 y.o. Room:  [facility]  MRN:  [id number]         1187 on unit to speak with patient. Patient's primary RN, Adeola, at bedside for morning assessment. Will attempt to follow up with patient later. 2222 Again attempted to speak with patient. Patient door closed for patient care.  Will attempt to speak with patient later today      Electronically signed by Jovanny Salcido RN on 12/15/2022 at 9:17 AM             Palliative Care Office: [phone]

## 2022-12-15 NOTE — PLAN OF CARE
Problem: Discharge Planning  Goal: Discharge to home or other facility with appropriate resources  Outcome: Progressing  Discharge to home or other facility with appropriate resources:   Identify barriers to discharge with patient and caregiver   Arrange for needed discharge resources and transportation as appropriate   Identify discharge learning needs (meds, wound care, etc)   Arrange for interpreters to assist at discharge as needed   Refer to discharge planning if patient needs post-hospital services based on physician order or complex needs related to functional status, cognitive ability or social support system  Note: Discharge planning in process and discussed with patient/family. Social work consulted for any additional needs. Care manager aware of discharge needs. Problem: Neurosensory - Adult  Goal: Achieves stable or improved neurological status  Outcome: Progressing  Achieves stable or improved neurological status:   Assess for and report changes in neurological status   Initiate measures to prevent increased intracranial pressure   Maintain blood pressure and fluid volume within ordered parameters to optimize cerebral perfusion and minimize risk of hemorrhage   Monitor temperature, glucose, and sodium. Initiate appropriate interventions as ordered  Note: Pt is alert and oriented x4, remains dysarthric and weak on left side. Able to follow commands.         Problem: Respiratory - Adult  Goal: Achieves optimal ventilation and oxygenation  Outcome: Progressing  Achieves optimal ventilation and oxygenation:   Assess for changes in respiratory status   Assess for changes in mentation and behavior   Position to facilitate oxygenation and minimize respiratory effort   Oxygen supplementation based on oxygen saturation or arterial blood gases   Initiate smoking cessation protocol as indicated   Encourage broncho-pulmonary hygiene including cough, deep breathe, incentive spirometry   Assess the need for suctioning and aspirate as needed   Assess and instruct to report shortness of breath or any respiratory difficulty   Respiratory therapy support as indicated  Note: Pt on room air. Lungs clear and diminished. Problem: Cardiovascular - Adult  Goal: Maintains optimal cardiac output and hemodynamic stability  Outcome: Progressing  Maintains optimal cardiac output and hemodynamic stability:   Monitor blood pressure and heart rate   Monitor urine output and notify Licensed Independent Practitioner for values outside of normal range   Assess for signs of decreased cardiac output  Note:   Vitals:    12/15/22 0402 12/15/22 0852 12/15/22 1007 12/15/22 1154   BP: (!) 170/82 (!) 187/78 (!) 163/82 (!) 163/77   Pulse: 58 72  65   Resp: 18 16  22   Temp: 98.3 °F (36.8 °C) 98.8 °F (37.1 °C)  98.6 °F (37 °C)   TempSrc: Oral Oral  Oral   SpO2: 95% 94%  94%   Weight: 205 lb 14.4 oz (93.4 kg)      Height:              Problem: Skin/Tissue Integrity - Adult  Goal: Skin integrity remains intact  Outcome: Progressing  Skin Integrity Remains Intact: Monitor for areas of redness and/or skin breakdown  Note: No signs of new skin breakdown with each assessment. Skin remains warm, dry, intact. Mucous membranes pink & moist. Turning patient every two hours and as needed. Problem: Gastrointestinal - Adult  Goal: Maintains adequate nutritional intake  Outcome: Progressing  Maintains adequate nutritional intake:   Monitor percentage of each meal consumed   Identify factors contributing to decreased intake, treat as appropriate   Assist with meals as needed   Monitor intake and output, weight and lab values   Obtain nutritional consult as needed  Note: Pt tolerating PO diet.         Problem: Metabolic/Fluid and Electrolytes - Adult  Goal: Glucose maintained within prescribed range  Outcome: Progressing  Glucose maintained within prescribed range:   Monitor blood glucose as ordered   Assess for signs and symptoms of hyperglycemia and hypoglycemia   Administer ordered medications to maintain glucose within target range   Assess barriers to adequate nutritional intake and initiate nutrition consult as needed   Instruct patient on self management of diabetes and initiate consult as needed       Problem: Pain  Goal: Verbalizes/displays adequate comfort level or baseline comfort level  Outcome: Progressing  Verbalizes/displays adequate comfort level or baseline comfort level:   Encourage patient to monitor pain and request assistance   Assess pain using appropriate pain scale   Administer analgesics based on type and severity of pain and evaluate response   Implement non-pharmacological measures as appropriate and evaluate response   Consider cultural and social influences on pain and pain management  Note: Patient able to use 0-10 pain scale, pain goal of no pain. Denies pain at this time. Agreeable to take PRN pain medications. Problem: Safety - Adult  Goal: Free from fall injury  Outcome: Progressing  Free From Fall Injury:   Instruct family/caregiver on patient safety   Based on caregiver fall risk screen, instruct family/caregiver to ask for assistance with transferring infant if caregiver noted to have fall risk factors  Note: Pt free from fall injury this shift. Problem: ABCDS Injury Assessment  Goal: Absence of physical injury  Outcome: Progressing  Absence of Physical Injury: Implement safety measures based on patient assessment     Care plan reviewed with patient and her brother. Patient verbalizes understanding of the plan of care and contributed to goal setting.

## 2022-12-16 LAB
ANION GAP SERPL CALCULATED.3IONS-SCNC: 13 MEQ/L (ref 8–16)
BASOPHILS # BLD: 0.1 %
BASOPHILS ABSOLUTE: 0 THOU/MM3 (ref 0–0.1)
BUN BLDV-MCNC: 11 MG/DL (ref 7–22)
CALCIUM SERPL-MCNC: 9.5 MG/DL (ref 8.5–10.5)
CHLORIDE BLD-SCNC: 97 MEQ/L (ref 98–111)
CO2: 28 MEQ/L (ref 23–33)
CREAT SERPL-MCNC: 0.7 MG/DL (ref 0.4–1.2)
EOSINOPHIL # BLD: 0.4 %
EOSINOPHILS ABSOLUTE: 0 THOU/MM3 (ref 0–0.4)
ERYTHROCYTE [DISTWIDTH] IN BLOOD BY AUTOMATED COUNT: 14.7 % (ref 11.5–14.5)
ERYTHROCYTE [DISTWIDTH] IN BLOOD BY AUTOMATED COUNT: 49.9 FL (ref 35–45)
GFR SERPL CREATININE-BSD FRML MDRD: > 60 ML/MIN/1.73M2
GLUCOSE BLD-MCNC: 122 MG/DL (ref 70–108)
GLUCOSE BLD-MCNC: 142 MG/DL (ref 70–108)
GLUCOSE BLD-MCNC: 151 MG/DL (ref 70–108)
GLUCOSE BLD-MCNC: 166 MG/DL (ref 70–108)
GLUCOSE BLD-MCNC: 180 MG/DL (ref 70–108)
HCT VFR BLD CALC: 41.5 % (ref 37–47)
HEMOGLOBIN: 13.3 GM/DL (ref 12–16)
IMMATURE GRANS (ABS): 0.01 THOU/MM3 (ref 0–0.07)
IMMATURE GRANULOCYTES: 0.1 %
LYMPHOCYTES # BLD: 15 %
LYMPHOCYTES ABSOLUTE: 1 THOU/MM3 (ref 1–4.8)
MAGNESIUM: 1.6 MG/DL (ref 1.6–2.4)
MAGNESIUM: 2.1 MG/DL (ref 1.6–2.4)
MCH RBC QN AUTO: 29.8 PG (ref 26–33)
MCHC RBC AUTO-ENTMCNC: 32 GM/DL (ref 32.2–35.5)
MCV RBC AUTO: 93 FL (ref 81–99)
MONOCYTES # BLD: 8.1 %
MONOCYTES ABSOLUTE: 0.6 THOU/MM3 (ref 0.4–1.3)
NUCLEATED RED BLOOD CELLS: 0 /100 WBC
PLATELET # BLD: 195 THOU/MM3 (ref 130–400)
PMV BLD AUTO: 10.5 FL (ref 9.4–12.4)
POTASSIUM SERPL-SCNC: 3.3 MEQ/L (ref 3.5–5.2)
POTASSIUM SERPL-SCNC: 4.1 MEQ/L (ref 3.5–5.2)
POTASSIUM SERPL-SCNC: 5.9 MEQ/L (ref 3.5–5.2)
RBC # BLD: 4.46 MILL/MM3 (ref 4.2–5.4)
SEG NEUTROPHILS: 76.3 %
SEGMENTED NEUTROPHILS ABSOLUTE COUNT: 5.2 THOU/MM3 (ref 1.8–7.7)
SODIUM BLD-SCNC: 138 MEQ/L (ref 135–145)
WBC # BLD: 6.8 THOU/MM3 (ref 4.8–10.8)

## 2022-12-16 PROCEDURE — 6370000000 HC RX 637 (ALT 250 FOR IP)

## 2022-12-16 PROCEDURE — 97112 NEUROMUSCULAR REEDUCATION: CPT

## 2022-12-16 PROCEDURE — 6370000000 HC RX 637 (ALT 250 FOR IP): Performed by: STUDENT IN AN ORGANIZED HEALTH CARE EDUCATION/TRAINING PROGRAM

## 2022-12-16 PROCEDURE — 2060000000 HC ICU INTERMEDIATE R&B

## 2022-12-16 PROCEDURE — 6370000000 HC RX 637 (ALT 250 FOR IP): Performed by: SOCIAL WORKER

## 2022-12-16 PROCEDURE — 92526 ORAL FUNCTION THERAPY: CPT

## 2022-12-16 PROCEDURE — 84132 ASSAY OF SERUM POTASSIUM: CPT

## 2022-12-16 PROCEDURE — 97535 SELF CARE MNGMENT TRAINING: CPT

## 2022-12-16 PROCEDURE — 97110 THERAPEUTIC EXERCISES: CPT

## 2022-12-16 PROCEDURE — 6360000002 HC RX W HCPCS: Performed by: STUDENT IN AN ORGANIZED HEALTH CARE EDUCATION/TRAINING PROGRAM

## 2022-12-16 PROCEDURE — 85025 COMPLETE CBC W/AUTO DIFF WBC: CPT

## 2022-12-16 PROCEDURE — 2580000003 HC RX 258: Performed by: STUDENT IN AN ORGANIZED HEALTH CARE EDUCATION/TRAINING PROGRAM

## 2022-12-16 PROCEDURE — 97530 THERAPEUTIC ACTIVITIES: CPT

## 2022-12-16 PROCEDURE — 80048 BASIC METABOLIC PNL TOTAL CA: CPT

## 2022-12-16 PROCEDURE — 83735 ASSAY OF MAGNESIUM: CPT

## 2022-12-16 PROCEDURE — 99233 SBSQ HOSP IP/OBS HIGH 50: CPT | Performed by: STUDENT IN AN ORGANIZED HEALTH CARE EDUCATION/TRAINING PROGRAM

## 2022-12-16 PROCEDURE — 36415 COLL VENOUS BLD VENIPUNCTURE: CPT

## 2022-12-16 PROCEDURE — 2580000003 HC RX 258

## 2022-12-16 PROCEDURE — 82948 REAGENT STRIP/BLOOD GLUCOSE: CPT

## 2022-12-16 PROCEDURE — 92507 TX SP LANG VOICE COMM INDIV: CPT

## 2022-12-16 RX ORDER — HYDROXYZINE HYDROCHLORIDE 25 MG/1
50 TABLET, FILM COATED ORAL EVERY EVENING
Status: DISCONTINUED | OUTPATIENT
Start: 2022-12-16 | End: 2022-12-17 | Stop reason: HOSPADM

## 2022-12-16 RX ORDER — POTASSIUM CHLORIDE 20 MEQ/1
40 TABLET, EXTENDED RELEASE ORAL 2 TIMES DAILY WITH MEALS
Status: DISCONTINUED | OUTPATIENT
Start: 2022-12-16 | End: 2022-12-16

## 2022-12-16 RX ORDER — HYDRALAZINE HYDROCHLORIDE 25 MG/1
25 TABLET, FILM COATED ORAL EVERY 8 HOURS SCHEDULED
Status: DISCONTINUED | OUTPATIENT
Start: 2022-12-16 | End: 2022-12-17 | Stop reason: HOSPADM

## 2022-12-16 RX ORDER — CARVEDILOL 6.25 MG/1
12.5 TABLET ORAL 2 TIMES DAILY WITH MEALS
Status: DISCONTINUED | OUTPATIENT
Start: 2022-12-16 | End: 2022-12-17 | Stop reason: HOSPADM

## 2022-12-16 RX ORDER — MAGNESIUM SULFATE IN WATER 40 MG/ML
2000 INJECTION, SOLUTION INTRAVENOUS ONCE
Status: COMPLETED | OUTPATIENT
Start: 2022-12-16 | End: 2022-12-16

## 2022-12-16 RX ORDER — POTASSIUM CHLORIDE 20 MEQ/1
40 TABLET, EXTENDED RELEASE ORAL PRN
Status: DISCONTINUED | OUTPATIENT
Start: 2022-12-16 | End: 2022-12-17 | Stop reason: HOSPADM

## 2022-12-16 RX ORDER — POTASSIUM CHLORIDE 7.45 MG/ML
10 INJECTION INTRAVENOUS PRN
Status: DISCONTINUED | OUTPATIENT
Start: 2022-12-16 | End: 2022-12-17 | Stop reason: HOSPADM

## 2022-12-16 RX ORDER — HYDROXYZINE HYDROCHLORIDE 25 MG/1
25 TABLET, FILM COATED ORAL EVERY 6 HOURS PRN
Status: DISCONTINUED | OUTPATIENT
Start: 2022-12-16 | End: 2022-12-17 | Stop reason: HOSPADM

## 2022-12-16 RX ORDER — POTASSIUM CHLORIDE 7.45 MG/ML
10 INJECTION INTRAVENOUS
Status: COMPLETED | OUTPATIENT
Start: 2022-12-16 | End: 2022-12-16

## 2022-12-16 RX ORDER — SODIUM CHLORIDE 9 MG/ML
INJECTION, SOLUTION INTRAVENOUS CONTINUOUS
Status: DISCONTINUED | OUTPATIENT
Start: 2022-12-16 | End: 2022-12-17 | Stop reason: HOSPADM

## 2022-12-16 RX ADMIN — AMLODIPINE BESYLATE 10 MG: 10 TABLET ORAL at 11:14

## 2022-12-16 RX ADMIN — CARVEDILOL 12.5 MG: 6.25 TABLET, FILM COATED ORAL at 10:11

## 2022-12-16 RX ADMIN — CEFTRIAXONE SODIUM 1000 MG: 1 INJECTION, POWDER, FOR SOLUTION INTRAMUSCULAR; INTRAVENOUS at 08:50

## 2022-12-16 RX ADMIN — ASPIRIN 81 MG: 81 TABLET, CHEWABLE ORAL at 08:54

## 2022-12-16 RX ADMIN — POTASSIUM CHLORIDE 10 MEQ: 7.46 INJECTION, SOLUTION INTRAVENOUS at 15:32

## 2022-12-16 RX ADMIN — ATORVASTATIN CALCIUM 80 MG: 80 TABLET, FILM COATED ORAL at 20:15

## 2022-12-16 RX ADMIN — POTASSIUM CHLORIDE 10 MEQ: 7.46 INJECTION, SOLUTION INTRAVENOUS at 14:11

## 2022-12-16 RX ADMIN — POTASSIUM CHLORIDE 10 MEQ: 7.46 INJECTION, SOLUTION INTRAVENOUS at 12:46

## 2022-12-16 RX ADMIN — ACETAMINOPHEN 650 MG: 325 TABLET ORAL at 08:52

## 2022-12-16 RX ADMIN — HYDRALAZINE HYDROCHLORIDE 25 MG: 25 TABLET, FILM COATED ORAL at 20:16

## 2022-12-16 RX ADMIN — ESCITALOPRAM OXALATE 10 MG: 10 TABLET ORAL at 08:53

## 2022-12-16 RX ADMIN — TICAGRELOR 90 MG: 90 TABLET ORAL at 08:51

## 2022-12-16 RX ADMIN — HYDROXYZINE HYDROCHLORIDE 50 MG: 25 TABLET, FILM COATED ORAL at 20:15

## 2022-12-16 RX ADMIN — TICAGRELOR 90 MG: 90 TABLET ORAL at 20:15

## 2022-12-16 RX ADMIN — CARVEDILOL 12.5 MG: 6.25 TABLET, FILM COATED ORAL at 16:26

## 2022-12-16 RX ADMIN — POTASSIUM CHLORIDE 10 MEQ: 7.46 INJECTION, SOLUTION INTRAVENOUS at 10:22

## 2022-12-16 RX ADMIN — MAGNESIUM SULFATE HEPTAHYDRATE 2000 MG: 40 INJECTION, SOLUTION INTRAVENOUS at 10:07

## 2022-12-16 RX ADMIN — SODIUM CHLORIDE: 9 INJECTION, SOLUTION INTRAVENOUS at 15:04

## 2022-12-16 RX ADMIN — PANTOPRAZOLE SODIUM 40 MG: 40 TABLET, DELAYED RELEASE ORAL at 06:12

## 2022-12-16 RX ADMIN — LISINOPRIL 40 MG: 40 TABLET ORAL at 08:55

## 2022-12-16 RX ADMIN — SODIUM CHLORIDE, PRESERVATIVE FREE 10 ML: 5 INJECTION INTRAVENOUS at 08:49

## 2022-12-16 RX ADMIN — HYDRALAZINE HYDROCHLORIDE 50 MG: 50 TABLET, FILM COATED ORAL at 06:12

## 2022-12-16 ASSESSMENT — PAIN DESCRIPTION - DESCRIPTORS: DESCRIPTORS: ACHING

## 2022-12-16 ASSESSMENT — PAIN DESCRIPTION - FREQUENCY: FREQUENCY: CONTINUOUS

## 2022-12-16 ASSESSMENT — PAIN - FUNCTIONAL ASSESSMENT: PAIN_FUNCTIONAL_ASSESSMENT: PREVENTS OR INTERFERES SOME ACTIVE ACTIVITIES AND ADLS

## 2022-12-16 ASSESSMENT — PAIN SCALES - GENERAL
PAINLEVEL_OUTOF10: 6
PAINLEVEL_OUTOF10: 0
PAINLEVEL_OUTOF10: 0
PAINLEVEL_OUTOF10: 10

## 2022-12-16 ASSESSMENT — PAIN DESCRIPTION - ONSET: ONSET: ON-GOING

## 2022-12-16 ASSESSMENT — PAIN DESCRIPTION - LOCATION: LOCATION: RIB CAGE;FLANK

## 2022-12-16 ASSESSMENT — PAIN DESCRIPTION - PAIN TYPE: TYPE: ACUTE PAIN

## 2022-12-16 ASSESSMENT — PAIN DESCRIPTION - ORIENTATION: ORIENTATION: LEFT

## 2022-12-16 NOTE — PLAN OF CARE
Problem: Discharge Planning  Goal: Discharge to home or other facility with appropriate resources  12/15/2022 2116 by Anne Rice RN  Outcome: [id number] (Taken 12/15/2022 2051)  Discharge to home or other facility with appropriate resources:   Identify barriers to discharge with patient and caregiver   Refer to discharge planning if patient needs post-hospital services based on physician order or complex needs related to functional status, cognitive ability or social support system     Problem: Neurosensory - Adult  Goal: Achieves stable or improved neurological status  12/15/2022 2116 by Anne Rice RN  Outcome: Progressing     Problem: Neurosensory - Adult  Goal: Achieves maximal functionality and self care  12/15/2022 2116 by Anne Rice RN  Outcome: Progressing     Problem: Cardiovascular - Adult  Goal: Maintains optimal cardiac output and hemodynamic stability  12/15/2022 2116 by Anne Rice RN  Outcome: Progressing     Problem: Skin/Tissue Integrity - Adult  Goal: Skin integrity remains intact  12/15/2022 2116 by Anne Rice RN  Outcome: Progressing  Flowsheets (Taken 12/15/2022 2051)  Skin Integrity Remains Intact: Monitor for areas of redness and/or skin breakdown     Problem: Skin/Tissue Integrity - Adult  Goal: Oral mucous membranes remain intact  12/15/2022 2116 by Anne Rice RN  Outcome: Progressing  Flowsheets (Taken 12/15/2022 2051)  Oral Mucous Membranes Remain Intact: Assess oral mucosa and hygiene practices     Problem: Gastrointestinal - Adult  Goal: Maintains adequate nutritional intake  12/15/2022 2116 by Anne Rice RN  Outcome: Progressing  Flowsheets (Taken 12/15/2022 2051)  Maintains adequate nutritional intake: Monitor percentage of each meal consumed     Problem: Metabolic/Fluid and Electrolytes - Adult  Goal: Glucose maintained within prescribed range  12/15/2022 2116 by Anne Rice RN  Outcome: Progressing  Flowsheets (Taken 12/15/2022 2051)  Glucose maintained within prescribed range:   Monitor blood glucose as ordered   Assess for signs and symptoms of hyperglycemia and hypoglycemia     Problem: Pain  Goal: Verbalizes/displays adequate comfort level or baseline comfort level  12/15/2022 2116 by Gerber Wong RN  Outcome: Progressing  Note: Pain goal of no pain     Problem: Safety - Adult  Goal: Free from fall injury  12/15/2022 2116 by Gerber Wong RN  Outcome: Roxane Cart (Taken 12/15/2022 2116)  Free From Fall Injury:   Instruct family/caregiver on patient safety   Based on caregiver fall risk screen, instruct family/caregiver to ask for assistance with transferring infant if caregiver noted to have fall risk factors     Problem: ABCDS Injury Assessment  Goal: Absence of physical injury  12/15/2022 2116 by Gerber Wong RN  Outcome: Progressing  [address] (Taken 12/15/2022 2116)  Absence of Physical Injury: Implement safety measures based on patient assessment. Care plan reviewed with patient. Patient  verbalize understanding of the plan of care and contribute to goal setting. No

## 2022-12-16 NOTE — CARE COORDINATION
12/16/22, 11:57 AM EST    DISCHARGE PLANNING EVALUATION    Called Fadumo Leonard at [facility]. He reports that they have not heard back on patients precert. He will let  know if it comes back today. Update 3:09 pm: Ce[facility] precert has been approved. Spoke with Dr. Tamara Grant and she is agreeable to  setting up transport for tomorrow. 12/16/22, 3:10 PM EST    Patient goals/plan/ treatment preferences discussed by  and . Patient goals/plan/ treatment preferences reviewed with patient/ family. Patient/ family verbalize understanding of discharge plan and are in agreement with goal/plan/treatment preferences. Understanding was demonstrated using the teach back method. AVS provided by RN at time of discharge, which includes all necessary medical information pertaining to the patients current course of illness, treatment, post-discharge goals of care, and treatment preferences. Services At/After Discharge: [facility] [place] (SNF), Aide services, In ambulance, Nursing service, OT, and PT       IMM Letter  IMM Letter given to Patient/Family/Significant other/Guardian/POA/by[de-identified]   IMM Letter date given[de-identified] 12/16/22  IMM Letter time given[de-identified] Raven Engel is a potential discharge for tomorrow back to [facility]. She will be skilled at the facility under her [employer] Medicare benefit. She will be transported by ambulance. Set up transport for Saturday after 2:00 pm. Discharge instructions and transport forms are attached to blue discharge packet. Spoke with Beatriz Boles at [facility] to make him aware. Called brother Alysha Franklin to update him.

## 2022-12-16 NOTE — PROGRESS NOTES
[address] [address]  INPATIENT SPEECH THERAPY  [facility] NEUROSCIENCES 4A  DAILY NOTE    TIME   SLP Individual Minutes  Time In: 1169  Time Out: [place]  Minutes: 16  Timed Code Treatment Minutes: 0 Minutes   Dysphagia therapy: 8 minutes  Speech/language therapy: 8 minutes     Date: 2022  Patient Name: Velia Gonzalez      CSN: [id number]   : 1960  (65 y.o.)  Gender: female   Referring Physician: Carmen Goldman PA-C  Diagnosis: Acute CVA   Precautions: Fall risk, aspiration precautions  Current Diet: Puree diet with mildly thick liquids   Swallowing Strategies: Full Upright Position, Small Bite/Sip, No Straw, Multiple Swallow, Pulmonary Monitoring, Oral Care after all Meals, Medications Crushed with Puree, Direct 1:1 Supervision, Alternate Solids and Liquids, Limit Distractions, and Monitor for Fatigue              Date of Last MBS/FEES: MBS: 2022    Pain:  No pain reported. Subjective:  PADMINI Wesley with approval to proceed with session. Upon arrival, patient resting in bed with flat affect, however, active participation in structured interventions. Short-Term Goals:  SHORT TERM GOAL #1:  Goal 1: Patient will safely consume puree diet, mildly thick liquids (cup intake for fluids; advanced textures as clinically indicated) and engage in oropharyngeal strengthening+thermal gustatory program to improve oral phase weakness, airway protection, and timeliness for pharyngeal onset trigger. INTERVENTIONS: Nursing with indications for limited-0 intake of breakfast meal at date, however, consumption of medications without overt difficulty crushed in puree. Head of bed elevated to approximate high hu placement with direct 1:1 assistance employed for intake of PO offerings to assist with dysphagia management POC development. Compensatory strategies included: modality for intake of fluids via flexible cup with placement on R labial quadrant, small sips, and multiple swallows.  Patient with delayed oral phase given significance of weakness presenting, efficacious with additional time with no presence of overt pocketing. Absence of anterior spillage with utilization of modified cup for intake of mildly thick liquids to additionally assist with control measures. No overt s/s aspiration exhibited across all consistencies/trials consumed, certainly not able to r/o totality of airway invasion events at bedside alone; patient's swallow physiology appears functional to support goal for comfortable oral intake without distress. Patient maintains to be at heightened risk for pulmonary compromise as it r/t swallow function. Recommendations to maintain conservative diet of puree solids with mildly thick liquids, STRICT adherence to identified compensatory strategies. Ongoing dysphagia interventions to be prioritized. *post session, patient without respiratory distress upon leaving room; RN Ander Lombard notified re: clinical findings and recommendations, including should pulmonary concerns be noted, recommendations to downgrade to NPO with alternate instrumental evaluation via FEES be completed to evaluate throughout the meal span to assess endurance measures and potential risk for swallowing decompensation; verbal receptiveness noted   **alternate f/u completed with Laveta Sport re: usage of flexible cup for intake of mildly thick liquids to focus on small sip consumption; verbal receptiveness noted    SHORT TERM GOAL #2:  Goal 2: Patient will engage in structured speech drills/tasks (DDK rates, multi-syllabic word repetition, core functional phrases) with implementation of SOS speech strategies to improve intelligibility to 50% in known contexts to optimize speech production skills for listener comprehension.   INTERVENTIONS: Speech intelligibility best approximates 30% in known context with severe dysarthria noted; presence of blended word boundaries and imprecise articulatory placement with specific strategies of speaking loudly and over-enunciating highlighted. C repetition  -/p/: poor ability to generate intra-oral pressure for plosive sound  -/m/: fair intelligibility  -/t/: fair intelligibility  -/k/: good intelligibility, likely given velar placement    CVC repetition  -Pat: 50% intelligibility d/t ongoing challenges with bilabial and plosive sound    SHORT TERM GOAL #3:  Goal 3: Patient will complete functional immediate/delayed recall tasks with 70% accuracy, mod cues to improve retention of pertinent information. INTERVENTIONS:  Orientation: 5/6 independent, 1/6 min cues for MARINE via calendar  -excellent awareness to immediate surroundings and ability to scan to locate external aid    SHORT TERM GOAL #4:  Goal 4: Patient will complete problem solving, verbal/visual reasoning, and executive functioning tasks (time, money/math/finances, medications) with 70% accuracy, mod cues to improve contributions within ADL/IADL requirements. INTERVENTIONS: DNT d/t focus on additional STGs    SHORT TERM GOAL #5:  Goal 5: Patient will complete sequencing and thought organization tasks with 70% accuracy, mod cues to improve mental flexibility for daily living scenarios. INTERVENTIONS: DNT d/t focus on additional STGs    Long-Term Goals:   No LTG established given short ELOS      EDUCATION:  Learner: Patient  Education:  Reviewed diet and strategies, Reviewed signs, symptoms and risks of aspiration, Reviewed ST goals and Plan of Care, Reviewed recommendations for follow-up, and Education Related to Potential Risks and Complications Due to Impairment/Illness/Injury  Evaluation of Education: Demonstrates with assistance, Needs further instruction, and Family not present    ASSESSMENT/PLAN:  Activity Tolerance:  Patient tolerance of  treatment: fair. Assessment/Plan: Patient progressing toward established goals.   Continues to require skilled care of licensed speech pathologist to progress toward achievement of established goals and plan of care. .     Plan for Next Session: dysphagia management  Discharge Recommendations: [facility]         Brando Fairbanks M.A., [address] [place] [address]

## 2022-12-16 NOTE — PROGRESS NOTES
[address] [address]  INPATIENT PHYSICAL THERAPY  DAILY NOTE  [facility] 4A - 4A-07/007-A      Time In: 09  Time Out: 9153  Timed Code Treatment Minutes: 41 Minutes  Minutes: 41   Co-tx with OT due to multiple complexity and needing 2 skilled therapist for edge of bed        Date: 2022  Patient Name: Franca Baltazar,  Gender:  female        MRN: [id number]  : 1960  (65 y.o.)     Referring Practitioner: Ivette Ellison PA-C  Diagnosis: Acute CVA  Additional Pertinent Hx: Franca Baltazar who is a 65 y.o. female with a history of strokes with reported residual left sided weakness, DM2, HLD, HTN who presented to [address] [place] [address] ED this morning as an EMS activated stroke alert, initiated at 46. Patient comes from a nursing facility who reported last known well at 0925 this am. EMS reports baseline left side weakness and dysarthria secondary to previous stroke with acute worsening of these deficits this morning while working with therapy. EMS reports patient was leaning to the left and neglecting her left visual field. Initial NIH 6  - partial hemianopia L eye, LUE drift, LLE drift, left sided sensory deficit, dysarthria, intermittent command following. While in CT, patient noted to actively move LUE/LLE anti-gravity, seemingly waxing and waning. CT head negative for acute intracranial abnormalities. CTA head and neck revealed bilateral ICA occlusions. On-call neuro interventionalist, Dr. Cat Samson, on site in CT and recommended giving TNK and TNK administered 1108 . MRI shows Moderate-sized acute infarct involving portions of the right frontoparietal junction, the posterior right frontal lobe, the posterior right temporal lobe and the anterior inferior right occipital lobe and subacute infarct involving the superior right occipital lobe. There is some associated laminar necrosis. CTA neck shows Occluded cervical left internal carotid artery, there is abrupt cut off; occluded right ICA;  Absent A1 and A2 segments of left anterior cerebral artery; patent but small left middle cerebral artery and proximal branches; possible occlusion of right subclavian artery at its origin (this is not well seen); proximal right vertebral artery is not seen (this may also be occluded). Xray L shoulder negative for fracture. Prior Level of Function:  Type of Home: Facility  Home Layout: One level  Home Equipment: Walker, rolling        ADL Assistance: Needs assistance  Ambulation Assistance: Needs assistance  Transfer Assistance: Needs assistance  Active : No  Additional Comments: Prior to recent hospitalization for CVA and transfer to [facility] for rehab, pt had been living at home alone, independent with all ADL/IADL and mobility. Pt had been at [facility] for rehab since 12/2, was walking to/from bathroom with staff utilizing RW per family report.     Restrictions/Precautions:  Restrictions/Precautions: General Precautions, Fall Risk  Position Activity Restriction  Other position/activity restrictions: L hemianopsia, L neglect, pusher       SUBJECTIVE: nursing ok'd therapy, pt cooperative she demonstrated delay to respond to questions and commands, yet impulsive at times w/ sitting balance and awareness, noted slightly improved scanning to the left of midline however cont to demonstrate right field preference - pt incont of urine on arrival     PAIN: shoulder but no number given     Vitals: Vitals not assessed per clinical judgement, see nursing flowsheet    OBJECTIVE:  Bed Mobility:  Rolling to Left: Minimal Assistance, X 2, with rail, with verbal cues    Rolling to Right: Maximum Assistance, X 2, pt needed cues to keep left LE in hooklying position and she was somewhat resistive to roll to the right    Supine to Sit: Maximum Assistance, X 2  Sit to Supine: Maximum Assistance, X 2   Scooting: Maximum Assistance, X 2- pt needed assist to scoot right hip back on bed while sitting edge of bed  Balance:  Pt sat edge of bed for ~15 min- we used mirror for midline awareness- pt pushing to the left of midline and demonstrated decreased righting reaction when loss of balance occurred but she was helping to return to midline- pt required max assist of 1 with occ short moments of CGA and needed CGA of another for safety and for left UE awareness and placement- pt did complete reaching task and OT working on scanning to midline and left of midline- pt needed cues for left foot placement while sitting as she tended to ER and adduct her foot as she pushes with right UE and LE- did complete side prop to the right x 10 sec     Exercise:  Patient was guided in 1 set(s) 5 reps of exercise to both lower extremities. Long arc quads and pt attempted sitting hip flexion however at left only completed heel raises- prior to out of bed stretched left hip as she tends to rest it in extension with ER and slight tone noted but she demonstrated active movmenet in ankle, knee and hip with decreased coord  . Exercises were completed for increased independence with functional mobility. Functional Outcome Measures: Completed  AM-PAC Inpatient Mobility without Stair Climbing Raw Score : 7  AM-PAC Inpatient without Stair Climbing T-Scale Score : 28.66    ASSESSMENT:  Assessment: Patient progressing toward established goals. Pt demonstrated generalized weakness in left south body along with decreased balance as she cont to require 2 persons to safely sit edge of bed, she demonstrated decreased endurance and safety awareness. Pt would greatly benefit from cont skilled therapy prior to discharge home to improve functional independence and safety with functional mobility. Activity Tolerance:  Patient tolerance of  treatment: good.         Equipment Recommendations:Equipment Needed: No  Discharge Recommendations: Subacte/Skilled Nursing Facility  Plan: Current Treatment Recommendations: Strengthening, Balance training, Functional mobility training, Transfer training, Neuromuscular re-education, Safety education & training, Therapeutic activities, Patient/Caregiver education & training  General Plan:  (6x N)    Patient Education  Patient Education: Plan of Care    Goals:  Patient Goals : get better  Short Term Goals  Time Frame for Short Term Goals: by discharge  Short Term Goal 1: Pt to transfer supine <--> sit min A to enable pt to get in/out of bed. Short Term Goal 2: Pt to sit EOB with SBA for balance for improved core strength in prep for transfers. Short Term Goal 3: Pt to transfer sit <--> stand mod A for increased functional mobility. Short Term Goal 4: PT to assess pivot transfer or ambulation when applicable. Long Term Goals  Time Frame for Long Term Goals : NA due to short length of stay. Following session, patient left in safe position with all fall risk precautions in place.

## 2022-12-16 NOTE — PROGRESS NOTES
[address] [facility]  Occupational Therapy  Daily Note  Time:   Time In: 09  Time Out: 9997  Timed Code Treatment Minutes: 41 Minutes  Minutes: 41        Co-tx with PT d/t medical complexity requiring +2 skilled assist for therapeutic intervention. Date: 2022  Patient Name: Celestina Talamantes,   Gender: female      Room: -07/007-A  MRN: [id number]  : 1960  (65 y.o.)  Referring Practitioner: Mason Delaney PA-C  Diagnosis: CVA  Additional Pertinent Hx: Per EMR: Celestina Talamantes who is a 65 y.o. female with a history of strokes with reported residual left sided weakness, DM2, HLD, HTN who presented to [facility] ED this morning as an EMS activated stroke alert, initiated at 46. Patient comes from a nursing facility who reported last known well at 0925 this am. EMS reports baseline left side weakness and dysarthria secondary to previous stroke with acute worsening of these deficits this morning while working with therapy. EMS reports patient was leaning to the left and neglecting her left visual field. Initial NIH 6  - partial hemianopia L eye, LUE drift, LLE drift, left sided sensory deficit, dysarthria, intermittent command following. She is not on current anticoagulation. CT head negative for acute intracranial abnormalities. CTA head and neck revealed bilateral ICA occlusions. On-call neuro interventionalist, Dr. Lilian Alcazar, on site in [place] and recommended giving TNK. TNK administered 1108. MRI showed Moderate-sized acute infarct involving portions of the right frontoparietal junction, the posterior right frontal lobe, the posterior right temporal lobe and the anterior inferior right occipital lobe. 2. Subacute infarct involving the superior right occipital lobe.     Restrictions/Precautions:  Restrictions/Precautions: General Precautions, Fall Risk  Position Activity Restriction  Other position/activity restrictions: L hemianopsia, L neglect, pusher     SUBJECTIVE: Pt in bed upon arrival, agreeable to OT session. PAIN: Did not rate: L shoulder    Vitals: Vitals not assessed per clinical judgement, see nursing flowsheet    COGNITION: Slow Processing, Decreased Recall, Decreased Insight, Impaired Memory, Inattention, Decreased Problem Solving, Decreased Safety Awareness, Impaired Attention, Difficulty Following Commands, and pt able to verbalize short responses that were appropriate with increased time for word finding. ADL:   Grooming: Maximum Assistance and X 1. For sitting balance as pt completed reaching with RUE to obtain wash cloth and then able to wash face with cue'ing to wash L side of face. Lower Extremity Dressing: Moderate Assistance. Donning socks in supine after therapist started threading around toes and pt able to bend BLE to manage socks over heels   Toileting: Dependent. Pt incontinent of urine at start of session . BALANCE:  Sitting Balance:  Maximum Assistance, X 1. And CGA of another seated EOB ~15 minutes. Pt has poor midline orientation pushing to L hemibody    BED MOBILITY:  Rolling to Left: Maximum Assistance, X 2    Rolling to Right: Minimal Assistance, X 2    Supine to Sit: Maximum Assistance, X 2    Sit to Supine: Maximum Assistance, X 2    Scooting: Maximum Assistance, X 2 EOB    TRANSFERS & FUNCTIONAL MOBILITY:  OTR to further assess as appropriate. ADDITIONAL ACTIVITIES:  Pt participated in dynamic sitting activity to challenge balance, Wb'ing through south UE, and facilitate functional reaching into for visual scanning towards L side for rings and toss onto target with RUE- maxA for [address] position. Completed to increase safety with dynamic sitting required for showering tasks. ASSESSMENT:     Activity Tolerance:  Patient tolerance of  treatment: fair.  \      Discharge Recommendations: Subacute/skilled nursing facility  Equipment Recommendations: Equipment Needed: No  Other: defer to next level of care  Plan: Times Per Week: 6x  Current Treatment Recommendations: Strengthening, ROM, Balance training, Functional mobility training, Endurance training, Neuromuscular re-education, Safety education & training, Patient/Caregiver education & training, Positioning, Self-Care / ADL    Patient Education  Patient Education: ADL's, Orientation, Hemibody Awareness/Attention, Importance of Increasing Activity, and Safety with bed mobility, and midline orientation. Goals  Short Term Goals  Time Frame for Short Term Goals: by discharge  Short Term Goal 1: Pt will tolerate sitting at EOB X15 minutes with consistent CGA and moderate cues for midline orientation and awareness to L hemibody/environment, in prep for ADL completion. Short Term Goal 2: Pt will complete grooming/UB ADL with moderate assistance and moderate cuing for awareness to L hemibody to increase independence with self care tasks. Short Term Goal 3: Pt will scan to L side of body/environment with moderate cuing to increase awareness as needed for ADL completion. Short Term Goal 4: Pt will tolerate further assessment of transfers/functional mobility by OTR when appropriate. Following session, patient left in safe position with all fall risk precautions in place.

## 2022-12-16 NOTE — PLAN OF CARE
Problem: Discharge Planning  Goal: Discharge to home or other facility with appropriate resources  Outcome: Progressing  Flowsheets (Taken 12/16/2022 0817)  Discharge to home or other facility with appropriate resources: Identify barriers to discharge with patient and caregiver  Note: Patient educated that discharge plan is still in progress. Problem: Neurosensory - Adult  Goal: Achieves stable or improved neurological status  Outcome: Progressing  Flowsheets  Taken 12/16/2022 1156  Achieves stable or improved neurological status: Assess for and report changes in neurological status  Taken 12/16/2022 0817  Achieves stable or improved neurological status: Assess for and report changes in neurological status     Problem: Neurosensory - Adult  Goal: Achieves maximal functionality and self care  Outcome: Progressing  Flowsheets  Taken 12/16/2022 1156  Achieves maximal functionality and self care: Monitor swallowing and airway patency with patient fatigue and changes in neurological status  Taken 12/16/2022 0817  Achieves maximal functionality and self care: Monitor swallowing and airway patency with patient fatigue and changes in neurological status     Problem: Pain  Goal: Verbalizes/displays adequate comfort level or baseline comfort level  Outcome: Progressing  Flowsheets  Taken 12/16/2022 1156  Verbalizes/displays adequate comfort level or baseline comfort level: Encourage patient to monitor pain and request assistance  Taken 12/16/2022 1055  Verbalizes/displays adequate comfort level or baseline comfort level: Encourage patient to monitor pain and request assistance  Taken 12/16/2022 0817  Verbalizes/displays adequate comfort level or baseline comfort level:   Assess pain using appropriate pain scale   Encourage patient to monitor pain and request assistance  Note: Patient reports pain as a 10 on a 0-10 scale this shift. Patient's stated pain goal is no pain.  Pain is acute and located on the left side, described as aching. Non-pharmacological pain interventions include rest and repositioning. Pharmacological pain interventions on board per physician's order. Problem: Safety - Adult  Goal: Free from fall injury  Outcome: Progressing  Flowsheets (Taken 12/16/2022 1156)  Free From Fall Injury: Instruct family/caregiver on patient safety  Note: Patient remains free from falls this shift. Fall precautions in place with bed/chair exit alarmed. Fall sign posted and fall armband in place. Nonskid footwear used with transferring. Educated patient to use call light when in need of staff assistance with transferring, ambulating, and other activities of daily living. Patient appropriately uses call light this shift. Problem: ABCDS Injury Assessment  Goal: Absence of physical injury  Outcome: Progressing  Flowsheets (Taken 12/16/2022 1156)  Absence of Physical Injury: Implement safety measures based on patient assessment     Problem: Respiratory - Adult  Goal: Achieves optimal ventilation and oxygenation  Outcome: Progressing  Flowsheets  Taken 12/16/2022 1156  Achieves optimal ventilation and oxygenation: Assess for changes in respiratory status  Taken 12/16/2022 0817  Achieves optimal ventilation and oxygenation: Assess for changes in mentation and behavior  Note: Patient on room air at this time with oxygen saturations > 90%. Tolerating well.       Problem: Cardiovascular - Adult  Goal: Maintains optimal cardiac output and hemodynamic stability  Outcome: Progressing  Flowsheets  Taken 12/16/2022 1156  Maintains optimal cardiac output and hemodynamic stability: Monitor blood pressure and heart rate  Taken 12/16/2022 0817  Maintains optimal cardiac output and hemodynamic stability: Monitor blood pressure and heart rate  Note:   Vitals:    12/16/22 0358 12/16/22 0600 12/16/22 0817 12/16/22 1055   BP: (!) 175/79  (!) 158/65 123/71   Pulse: 66  74 79   Resp: 18  16 16   Temp:   98.8 °F (37.1 °C) 98.5 °F (36.9 °C) TempSrc:   Oral Oral   SpO2: 97%  91% 97%   Weight:  216 lb 8 oz (98.2 kg)     Height:              Problem: Skin/Tissue Integrity - Adult  Goal: Skin integrity remains intact  Outcome: Progressing  Flowsheets  Taken 12/16/2022 1156  Skin Integrity Remains Intact: Monitor for areas of redness and/or skin breakdown  Taken 12/16/2022 0817  Skin Integrity Remains Intact:   Monitor for areas of redness and/or skin breakdown   Assess vascular access sites hourly  Note: Patient exhibits no new skin breakdown this shift. Patient repositioned Q2H and as needed with staff assistance. All skin integrity issuse charted in Flowsheets. Will continue to monitor. Problem: Skin/Tissue Integrity - Adult  Goal: Oral mucous membranes remain intact  Outcome: Progressing  Flowsheets  Taken 12/16/2022 1156  Oral Mucous Membranes Remain Intact:   Assess oral mucosa and hygiene practices   Implement preventative oral hygiene regimen  Taken 12/16/2022 0817  Oral Mucous Membranes Remain Intact: Assess oral mucosa and hygiene practices     Problem: Gastrointestinal - Adult  Goal: Maintains adequate nutritional intake  Outcome: Progressing  Flowsheets  Taken 12/16/2022 1156  Maintains adequate nutritional intake: Monitor percentage of each meal consumed  Taken 12/16/2022 0817  Maintains adequate nutritional intake: Monitor percentage of each meal consumed     Problem: Metabolic/Fluid and Electrolytes - Adult  Goal: Glucose maintained within prescribed range  Outcome: Progressing  Flowsheets  Taken 12/16/2022 1156  Glucose maintained within prescribed range: Monitor blood glucose as ordered  Taken 12/16/2022 0817  Glucose maintained within prescribed range: Monitor blood glucose as ordered   Care plan reviewed with patient and family. Patient and family verbalize understanding of the plan of care and contribute to goal setting.

## 2022-12-16 NOTE — PROGRESS NOTES
Internal Medicine Resident Progress Note    Patient:  Minh Hernandez    YOB: 1960  Unit/Bed:4A-07/007-A  MRN: [id number]    Acct: [de-identified]   PCP: Gonzalez Yañez DO    Date of Admission: 12/12/2022      Assessment/Plan:  Ischemic stroke, recurrent on plavix, in both hemispheres. Hx of past CVA in 2010 and NOV2022. Had been recovering from most recent CVA in SNF with significantly worsened symptoms [id number] while on plavix. NIHSS 6 on arrival. Received TNK and admitted to ICU for 24h. MRI positive for acute infarcts to right frontoparietal junction, posterior right frontal region, posterior right temporal region, anterior-inferior occipital region, and a subacute infarct in the right occipital region. CTA noted bilateral ICA occlusions. No definitive intervention planned as risks of intervention unfortunately outweigh benefits. Echo grossly unremarkable. P2Y12 inhibitor test shows resistance to plavix. Palliative consulted, discussed goals of care with patient and brother present. Code status changed to Limited x 4 per patient wishes and family agreement. Neurology following  Continue brilinta 90mg BID + ASA 81mg daily. Recommend smoking cessation  Continue statin  Cleared by neurology to maximize antihypertensive regimen. Vascular, Dr. Earl Llanes, consulted:  No indication for endarterectomy. Palliative following. Continue PT/OT. Will require aggressive rehab  Pending precertification back to SNF  ?UTI. Patient initially unable to confirm/deny symptoms d/t communication barrier caused by CVAs, but does not remember whether she had any symptoms of UTI prior to admit. UA shows E. coli, colony count >100K. Suprapubic tenderness to palpation. Finish course of ceftriaxone  Acute hypoxic respiratory failure. Remains on 2L vis NC. Likely secondary to CVA. No home O2 needs prior to most recent strokes. Continue supplemental O2, titrate to >90%, wean as tolerated  NIDDM2.  On metformin outpatient, currently holding. LD SSI in place, however has not needed insulin during this admit. Continue to monitor with daily BMP  Dysarthria/dysphagia. Secondary to CVAs. Modified barium study [id number] showed laryngeal penetration of thin barium without evidence of aspiration. Poor PO intake on current diet. Start gentle IVFs  Diet per SLP recommendation  Currently recommends pureed diet, mildly thick liquids with intake via cup, and 1-to-1 assistance/supervision during meals. HTN, essential. Was on amlodipine-benazepril 10-20 outpatient. Was not taking atenolol at time of admit. Home meds restarted [id number]. Per neuro, cleared for optimization of BP. Initially on amlodipine 10mg daily and lisinopril 20mg daily. Lisinopril increased to 40mg daily and hydralazine 50mg TID started [id number]. Ongoing pressures to 202/90 overnight. Started on carvedilol 12.5 BID [id number], appears to be tolerating well: HR 79, /71. Continue current regimen  Continue to closely monitor BP  Adjust meds as needed to achieve BP goal  Depression with anxiety. Has been on buspar, lexapro, and atarax outpatient in the past, however had only been on lexapro at time of admit. Continue home lexapro and atarax. HLD. Continue high dose statin. GERD. Resume prevacid  Obesity. BMI 34.76. Noted. Expected discharge date:  Likely [id number], pending placement    Disposition: SNF   [] Home  [] TCU  [] Rehab  [] Psych  [] SNF  [] [place]  [] Other-    ===================================================================      Chief Complaint: stroke alert     Hospital Course:   Per original HPI:  Lyssa Gregg is a 65 y.o. female everyday smoker with a PMHx of NIDDM 2, HLD, HTN, GERD, depression, anxiety, asthma, and prior CVA in 2010 with residual left-sided weakness and dysarthria.  She presented to [facility] emergency department as a stroke alert with a last known well time on 12/12/2022 at 9:25 AM. She presented with an NIH score of 6. CT of the head was negative for acute intracranial abnormalities. CTA of the head/neck revealed bilateral ICA occlusions. The patient was consulted by neuro interventional and was subsequently administered TNK. She was admitted to the ICU for further observation post administration. Of note the patient had recently been evaluated here at [facility] on 11/22/2022 with complaint of dizziness. CT head at that time without acute findings and the patient was discharged home. She later presented to [facility] on 11/24/2022 for similar complaints and transferred to [facility]. At this time she was evaluated by interventional neurology where she was admitted from 11/24/22-12/2/2022 for similar symptoms. MRI brain at that time had evidence of large acute to subacute stroke of watershed regions and right occipital lobe. Diagnostic cerebral angiogram completed on 11/28/2022 revealed complete occlusion of the right ICA, left ICA, and right PCA. There was no stent intervention at that time. Patient's brother provided majority of the patient's history and stated that this started all before Thanksgiving of this year. He stated that she had prior strokes but with minimal deficit except some cognitive impairment and was living independently prior to Thanksgiving. The patient was discharged to SNF with ASA, Plavix, high intensity statin. \"     [id number] - Ongoing blood pressure management required. [id number] - Evaluated by vascular surgeon, Dr. Cande Ohara, who found no indication for vascular intervention. [id number] - Palliative care met with patient and family to discuss goals of care in setting of her multiple bihemispheric acute strokes over the past month with no definitive intervention possible by neurology or vascular surgery. Per patient wishes, code status updated to Limited x 4. Pending discharge back to SNF. Concern that there will be further strokes. Will require aggressive rehab. Subjective (past 24 hours):   Patient states through hand signing that she feels worse than yesterday with ongoing left-sided weakness and adjusting to her current functional status. Communicates through signing, nodding/shaking, and verbally. Denies headache, dizziness, n/v, chest pain, dyspnea, or abdominal pain. ROS: reviewed complete ROS unchanged unless otherwise stated in hospital course/subjective portion. Medications:  Reviewed    Infusion Medications    dextrose      sodium chloride       Scheduled Medications    carvedilol  12.5 mg Oral BID WC    potassium chloride  10 mEq IntraVENous Q2H    hydrALAZINE  25 mg Oral 3 times per day    ticagrelor  90 mg Oral BID    aspirin  81 mg Oral Daily    lisinopril  40 mg Oral Daily    cefTRIAXone (ROCEPHIN) IVPB in 50 mL NS  1,000 mg IntraVENous Daily    escitalopram  10 mg Oral Daily    pantoprazole  40 mg Oral QAM AC    amLODIPine  10 mg Oral Daily    atorvastatin  80 mg Oral Nightly    insulin lispro  0-4 Units SubCUTAneous TID WC    insulin lispro  0-4 Units SubCUTAneous Nightly    sodium chloride flush  5-40 mL IntraVENous 2 times per day     PRN Meds: potassium chloride **OR** potassium alternative oral replacement **OR** potassium chloride, hydrOXYzine HCl, melatonin, glucose, dextrose bolus **OR** dextrose bolus, glucagon (rDNA), dextrose, sodium chloride flush, sodium chloride, ondansetron **OR** ondansetron, polyethylene glycol, acetaminophen **OR** acetaminophen, promethazine        Intake/Output Summary (Last 24 hours) at 12/16/2022 1449  Last data filed at 12/16/2022 0358  Gross per 24 hour   Intake --   Output 0 ml   Net 0 ml       Exam:  /71   Pulse 79   Temp 98.5 °F (36.9 °C) (Oral)   Resp 16   Ht 5' 6\" (1.676 m)   Wt 216 lb 8 oz (98.2 kg)   SpO2 97%   BMI 34.94 kg/m²     General: chronically ill appearing  female, lying in bed, eyes closed, but easily rousable and interactive. Follows commands.   Eyes:  Nonicteric, no conjunctivitis, EOMs appear grossly intact, improved over prior exams. HENT: Normocephalic, atraumatic. Nares normal. Oral mucosa dry. Hearing intact. Neck: Supple, with full range of motion. No gross JVD appreciated. Respiratory:  No extra work of breathing. Clear to auscultation, without rales or wheezes or rhonchi. Cardiovascular: Normal rate, regular rhythm with normal J4/U8 with systolic murmur. Bilateral lower extremity edema. Abdomen: Soft, non-tender, non-distended with normal bowel sounds. Musculoskeletal: No joint swelling or tenderness. Normal tone. No abnormal movements. Skin: Warm and dry. No rashes or lesions. Neurologic:  Alert, follows commands, severely dysarthric. Improved left lower extremity strength over prior exam, ongoing severe left upper extremity weakness. Capillary Refill: Brisk,< 3 seconds. Peripheral Pulses: +2 palpable, equal bilaterally. Labs:   Recent Labs     12/14/22  0402 12/15/22  0414 12/16/22  0434   WBC 8.3 7.2 6.8   HGB 11.8* 12.6 13.3   HCT 36.8* 40.5 41.5    220 195     Recent Labs     12/14/22  0402 12/15/22  0414 12/16/22  0434    142 138   K 3.9 3.8 3.3*    102 97*   CO2 30 27 28   BUN 9 9 11   CREATININE 0.7 0.7 0.7   CALCIUM 9.5 10.1 9.5     No results for input(s): AST, ALT, BILIDIR, BILITOT, ALKPHOS in the last 72 hours. No results for input(s): INR in the last 72 hours. No results for input(s): Ailyn Ericka in the last 72 hours. No results for input(s): PROCAL in the last 72 hours. Lab Results   Component Value Date/Time    NITRU POSITIVE 12/13/2022 01:50 AM    WBCUA 15-25 12/13/2022 01:50 AM    BACTERIA MANY 12/13/2022 01:50 AM    RBCUA 0-2 12/13/2022 01:50 AM    BLOODU TRACE 12/13/2022 01:50 AM    SPECGRAV 1.022 10/17/2019 11:45 AM    SPECGRAV >=1.030 10/17/2019 10:50 AM    GLUCOSEU NEGATIVE 12/13/2022 01:50 AM       Radiology (48 hours):  CTA HEAD W WO CONTRAST (CODE STROKE)    Result Date: 12/12/2022   1. Occluded cervical left internal carotid artery. There is an abrupt cut off. This may be an acute occlusion. This cavernous sinus segment is diminutive. 2. Occluded right internal carotid artery. There is a normal cavernous sinus segment. 3. Absent A1 and A2 segments of the left anterior cerebral artery. 4. Patent but small left middle cerebral artery and proximal branches. 5. Possible occlusion of the right subclavian artery at its origin. This is not well seen. The proximal right vertebral artery is not seen. This may also be occluded. These findings were telephoned to Percy Nix MD at 11:48 AM on 12/12/2022. Christina Brown **This report has been created using voice recognition software. It may contain minor errors which are inherent in voice recognition technology. ** Final report electronically signed by Dr. Miguelangel Marsh on 12/12/2022 11:50 AM    CT HEAD WO CONTRAST    Result Date: 12/13/2022  Impression: 1. Developing subacute right parietotemporal infarct. This document has been electronically signed by: Geary Mortimer, MD on 12/13/2022 01:37 AM All CTs at this facility use dose modulation techniques and iterative reconstructions, and/or weight-based dosing when appropriate to reduce radiation to a low as reasonably achievable. CT HEAD WO CONTRAST    Result Date: 12/12/2022   1. No acute findings. 2. Stable appearing old infarcts in the left frontal lobe, both occipital lobes and a portion of the left parietal lobe. 3. Small old infarct in the medial aspect of the right basal ganglia. These findings were telephoned to Gwen Early at 11:02 AM on 12/12/2022 . **This report has been created using voice recognition software. It may contain minor errors which are inherent in voice recognition technology. ** Final report electronically signed by Dr. Miguelangel Marsh on 12/12/2022 11:06 AM    CTA NECK W WO CONTRAST    Result Date: 12/12/2022   1. Occluded cervical left internal carotid artery.  There is an abrupt cut off. This may be an acute occlusion. This cavernous sinus segment is diminutive. 2. Occluded right internal carotid artery. There is a normal cavernous sinus segment. 3. Absent A1 and A2 segments of the left anterior cerebral artery. 4. Patent but small left middle cerebral artery and proximal branches. 5. Possible occlusion of the right subclavian artery at its origin. This is not well seen. The proximal right vertebral artery is not seen. This may also be occluded. These findings were telephoned to Percy Nix MD at 11:48 AM on 12/12/2022. Christina Brown **This report has been created using voice recognition software. It may contain minor errors which are inherent in voice recognition technology. ** Final report electronically signed by Dr. Miguelangel Marsh on 12/12/2022 11:50 AM    XR SHOULDER LEFT (MIN 2 VIEWS)    Result Date: 12/13/2022   1. No evidence of acute osseous abnormality of the left shoulder. 2. Mild degenerative changes of the acromioclavicular joint. **This report has been created using voice recognition software. It may contain minor errors which are inherent in voice recognition technology. ** Final report electronically signed by Dr. Harjeet Cartwright MD on 12/13/2022 4:14 PM    XR CHEST PORTABLE    Result Date: 12/12/2022  1. Borderline cardiomegaly. 2. Atelectasis or scarring at left lung base. 3. Otherwise negative chest x-ray. . **This report has been created using voice recognition software. It may contain minor errors which are inherent in voice recognition technology. ** Final report electronically signed by DR Juan Subramanian on 12/12/2022 12:37 PM    MRI brain without contrast    Result Date: 12/13/2022   1. Moderate-sized acute infarct involving portions of the right frontoparietal junction, the posterior right frontal lobe, the posterior right temporal lobe and the anterior inferior right occipital lobe. 2. Subacute infarct involving the superior right occipital lobe.  There is some associated laminar necrosis. 3. Old infarcts in the left frontal and parietal lobes. **This report has been created using voice recognition software. It may contain minor errors which are inherent in voice recognition technology. ** Final report electronically signed by Dr. Alex Santos on 12/13/2022 12:02 PM       DVT prophylaxis:    [] Lovenox  [x] SCDs - bleeding risk  [] SQ Heparin  [] Encourage ambulation   [] Already on Anticoagulation       Diet: ADULT DIET; Dysphagia - Pureed; Mildly Thick (Nectar); No Drinking Straws  Code Status: Limited  PT/OT: Yes  Tele: Yes  IVF: NS 75ml/h in light of poor PO intake. Electronically signed by Mya Dumas MD, IM-[id number] on 12/16/2022 at 2:49 PM    Case was discussed with Attending, Dr. Aleta Pantoja.

## 2022-12-16 NOTE — PALLIATIVE CARE
Follow Up / Progress Note        Patient:   Velia Gonzalez  YOB: 1960  Age:  65 y.o. Room:  [address] [place] [address]  MRN:  [id number]            Family/Patient Discussion:  Chart reviewed. Dr Rosemarie Espinoza also stating in his note that there are no interventions available for patient. Reached out to Dr Kurtis Del Valle to discuss options for patient. Dr Kurtis Del Valle stating that unfortunately due to patient's 100% occlusion patient is not a candidate for any interventions. Dr Kurtis Del Valle stating she spoke with patient and patient was agreeable to [address] NO X4 code status, but Dr Kurtis Del Valle wanted to ensure family was also involved with plan of care. Call made to patient's brother, David Padilla. David Padilla was updated that there were no options for interventions for patient. Updated Pedrito that physician had spoken with patient regarding code status. David Padilla stated he understood code status change, denied further questions. Perfect serve message sent to Dr Kurtis Del Valle updating that this RN was able to speak with brother to update on code status change.           Plan/Follow-Up:  Code status changed to LIMITED NO X4      Electronically signed by Jac Brooks RN on 12/16/2022 at 11:06 AM             Palliative Care Office: [phone]

## 2022-12-17 VITALS
WEIGHT: 212.2 LBS | BODY MASS INDEX: 34.1 KG/M2 | RESPIRATION RATE: 16 BRPM | TEMPERATURE: 98.1 F | HEIGHT: 66 IN | HEART RATE: 56 BPM | DIASTOLIC BLOOD PRESSURE: 61 MMHG | OXYGEN SATURATION: 100 % | SYSTOLIC BLOOD PRESSURE: 140 MMHG

## 2022-12-17 LAB
ANION GAP SERPL CALCULATED.3IONS-SCNC: 15 MEQ/L (ref 8–16)
BUN BLDV-MCNC: 13 MG/DL (ref 7–22)
CALCIUM SERPL-MCNC: 9.8 MG/DL (ref 8.5–10.5)
CHLORIDE BLD-SCNC: 101 MEQ/L (ref 98–111)
CO2: 24 MEQ/L (ref 23–33)
CREAT SERPL-MCNC: 0.7 MG/DL (ref 0.4–1.2)
ERYTHROCYTE [DISTWIDTH] IN BLOOD BY AUTOMATED COUNT: 14.6 % (ref 11.5–14.5)
ERYTHROCYTE [DISTWIDTH] IN BLOOD BY AUTOMATED COUNT: 49.6 FL (ref 35–45)
GFR SERPL CREATININE-BSD FRML MDRD: > 60 ML/MIN/1.73M2
GLUCOSE BLD-MCNC: 128 MG/DL (ref 70–108)
GLUCOSE BLD-MCNC: 132 MG/DL (ref 70–108)
GLUCOSE BLD-MCNC: 173 MG/DL (ref 70–108)
HCT VFR BLD CALC: 40.7 % (ref 37–47)
HEMOGLOBIN: 12.9 GM/DL (ref 12–16)
MAGNESIUM: 1.9 MG/DL (ref 1.6–2.4)
MCH RBC QN AUTO: 29.4 PG (ref 26–33)
MCHC RBC AUTO-ENTMCNC: 31.7 GM/DL (ref 32.2–35.5)
MCV RBC AUTO: 92.7 FL (ref 81–99)
PLATELET # BLD: 196 THOU/MM3 (ref 130–400)
PMV BLD AUTO: 10.7 FL (ref 9.4–12.4)
POTASSIUM SERPL-SCNC: 3.7 MEQ/L (ref 3.5–5.2)
RBC # BLD: 4.39 MILL/MM3 (ref 4.2–5.4)
SODIUM BLD-SCNC: 140 MEQ/L (ref 135–145)
WBC # BLD: 6.7 THOU/MM3 (ref 4.8–10.8)

## 2022-12-17 PROCEDURE — 83735 ASSAY OF MAGNESIUM: CPT

## 2022-12-17 PROCEDURE — 97112 NEUROMUSCULAR REEDUCATION: CPT

## 2022-12-17 PROCEDURE — 97530 THERAPEUTIC ACTIVITIES: CPT

## 2022-12-17 PROCEDURE — 6370000000 HC RX 637 (ALT 250 FOR IP)

## 2022-12-17 PROCEDURE — 6370000000 HC RX 637 (ALT 250 FOR IP): Performed by: STUDENT IN AN ORGANIZED HEALTH CARE EDUCATION/TRAINING PROGRAM

## 2022-12-17 PROCEDURE — 80048 BASIC METABOLIC PNL TOTAL CA: CPT

## 2022-12-17 PROCEDURE — 82948 REAGENT STRIP/BLOOD GLUCOSE: CPT

## 2022-12-17 PROCEDURE — 2580000003 HC RX 258: Performed by: STUDENT IN AN ORGANIZED HEALTH CARE EDUCATION/TRAINING PROGRAM

## 2022-12-17 PROCEDURE — 6360000002 HC RX W HCPCS: Performed by: STUDENT IN AN ORGANIZED HEALTH CARE EDUCATION/TRAINING PROGRAM

## 2022-12-17 PROCEDURE — 6370000000 HC RX 637 (ALT 250 FOR IP): Performed by: SOCIAL WORKER

## 2022-12-17 PROCEDURE — 85027 COMPLETE CBC AUTOMATED: CPT

## 2022-12-17 PROCEDURE — 36415 COLL VENOUS BLD VENIPUNCTURE: CPT

## 2022-12-17 PROCEDURE — 97110 THERAPEUTIC EXERCISES: CPT

## 2022-12-17 PROCEDURE — 92526 ORAL FUNCTION THERAPY: CPT

## 2022-12-17 RX ORDER — HYDROXYZINE HYDROCHLORIDE 25 MG/1
25 TABLET, FILM COATED ORAL EVERY 6 HOURS PRN
Qty: 120 TABLET | Refills: 0 | Status: SHIPPED | OUTPATIENT
Start: 2022-12-17 | End: 2023-01-16

## 2022-12-17 RX ORDER — CARVEDILOL 12.5 MG/1
12.5 TABLET ORAL 2 TIMES DAILY WITH MEALS
Qty: 60 TABLET | Refills: 3 | Status: SHIPPED | OUTPATIENT
Start: 2022-12-17

## 2022-12-17 RX ORDER — HYDRALAZINE HYDROCHLORIDE 25 MG/1
25 TABLET, FILM COATED ORAL EVERY 8 HOURS SCHEDULED
Qty: 90 TABLET | Refills: 3 | Status: SHIPPED | OUTPATIENT
Start: 2022-12-17

## 2022-12-17 RX ORDER — AMLODIPINE BESYLATE 10 MG/1
10 TABLET ORAL DAILY
Qty: 30 TABLET | Refills: 3 | Status: SHIPPED | OUTPATIENT
Start: 2022-12-17

## 2022-12-17 RX ORDER — LISINOPRIL 40 MG/1
40 TABLET ORAL DAILY
Qty: 30 TABLET | Refills: 3 | Status: SHIPPED | OUTPATIENT
Start: 2022-12-17

## 2022-12-17 RX ADMIN — ESCITALOPRAM OXALATE 10 MG: 10 TABLET ORAL at 07:53

## 2022-12-17 RX ADMIN — SODIUM CHLORIDE, PRESERVATIVE FREE 10 ML: 5 INJECTION INTRAVENOUS at 08:54

## 2022-12-17 RX ADMIN — CARVEDILOL 12.5 MG: 6.25 TABLET, FILM COATED ORAL at 07:53

## 2022-12-17 RX ADMIN — LISINOPRIL 40 MG: 40 TABLET ORAL at 07:53

## 2022-12-17 RX ADMIN — ASPIRIN 81 MG: 81 TABLET, CHEWABLE ORAL at 07:53

## 2022-12-17 RX ADMIN — CEFTRIAXONE SODIUM 1000 MG: 1 INJECTION, POWDER, FOR SOLUTION INTRAMUSCULAR; INTRAVENOUS at 09:39

## 2022-12-17 RX ADMIN — AMLODIPINE BESYLATE 10 MG: 10 TABLET ORAL at 07:52

## 2022-12-17 RX ADMIN — HYDRALAZINE HYDROCHLORIDE 25 MG: 25 TABLET, FILM COATED ORAL at 06:30

## 2022-12-17 RX ADMIN — PANTOPRAZOLE SODIUM 40 MG: 40 TABLET, DELAYED RELEASE ORAL at 06:30

## 2022-12-17 RX ADMIN — TICAGRELOR 90 MG: 90 TABLET ORAL at 07:54

## 2022-12-17 ASSESSMENT — PAIN SCALES - GENERAL: PAINLEVEL_OUTOF10: 0

## 2022-12-17 NOTE — PROGRESS NOTES
[address] [place] [address]  Occupational Therapy  Daily Note  Time:   Time In: 6101  Time Out: 3049  Timed Code Treatment Minutes: 27 Minutes  Minutes: 27     Co-tx with PT due to medical complexity of patient, and pt requiring assistance of two skilled therapists       Date: 2022  Patient Name: Harmeet Hughes,   Gender: female      Room: [facility]07/007-A  MRN: [id number]  : 1960  (65 y.o.)  Referring Practitioner: Rocio Muro PA-C  Diagnosis: CVA  Additional Pertinent Hx: Per EMR: Harmeet Hughes who is a 65 y.o. female with a history of strokes with reported residual left sided weakness, DM2, HLD, HTN who presented to [facility] ED this morning as an EMS activated stroke alert, initiated at 46. Patient comes from a nursing facility who reported last known well at 0925 this am. EMS reports baseline left side weakness and dysarthria secondary to previous stroke with acute worsening of these deficits this morning while working with therapy. EMS reports patient was leaning to the left and neglecting her left visual field. Initial NIH 6  - partial hemianopia L eye, LUE drift, LLE drift, left sided sensory deficit, dysarthria, intermittent command following. She is not on current anticoagulation. CT head negative for acute intracranial abnormalities. CTA head and neck revealed bilateral ICA occlusions. On-call neuro interventionalist, Dr. Ivon Tillman, on site in CT and recommended giving TNK. TNK administered 1108. MRI showed Moderate-sized acute infarct involving portions of the right frontoparietal junction, the posterior right frontal lobe, the posterior right temporal lobe and the anterior inferior right occipital lobe. 2. Subacute infarct involving the superior right occipital lobe.     Restrictions/Precautions:  Restrictions/Precautions: General Precautions, Fall Risk  Position Activity Restriction  Other position/activity restrictions: L hemianopsia, L neglect, pusher     SUBJECTIVE: Pt laying in bed upon arrival, pt agreeable to OT session, RN Gave verbal approval for session     PAIN: 6/10: left side    Vitals: Nurse checked vitals prior to session    COGNITION: Impulsive and Expressive Aphasia    ADL:   Lower Extremity Dressing: Maximum Assistance. For donning socks . BALANCE:  Sitting Balance:  Minimal Assistance, Maximum Assistance. With pt sitting for 8 minutes this date, with pt requiring max A of one, and occasionally min A of another, with pt given max vc's for posterior lean, and for left lateral lean. Pt required [facility] assist with placing L hand on the bed. Pt noted to continue to have L side neglect with vc's for tracking, with mirror placed in front of pt, however pt able to follow 25% of commands. Pt continued to be impulsive with sitting at the EOB with ataxic movements with RLE. BED MOBILITY:  Rolling to Left: Maximum Assistance    Rolling to Right: Maximum Assistance    Supine to Sit: Maximum Assistance, X 2, with head of bed flat    Sit to Supine: Maximum Assistance, X 2, with head of bed flat      ADDITIONAL ACTIVITIES:  Pt able to tolerate PROM to LUE focusing on flexion, abd/add, elbow flexion, wrist flexion, with pt tolerating 5-10 repetitions this date. ASSESSMENT:     Activity Tolerance:  Patient tolerance of  treatment: good.        Discharge Recommendations: Inpatient Rehabilitation  Equipment Recommendations: Equipment Needed: No  Other: defer to next level of care  Plan: Times Per Week: 6x  Current Treatment Recommendations: Strengthening, ROM, Balance training, Functional mobility training, Endurance training, Neuromuscular re-education, Safety education & training, Patient/Caregiver education & training, Positioning, Self-Care / ADL    Patient Education  Patient Education: Precautions and Orientation    Goals  Short Term Goals  Time Frame for Short Term Goals: by discharge  Short Term Goal 1: Pt will tolerate sitting at EOB X15 minutes with consistent CGA and moderate cues for midline orientation and awareness to L hemibody/environment, in prep for ADL completion. Short Term Goal 2: Pt will complete grooming/UB ADL with moderate assistance and moderate cuing for awareness to L hemibody to increase independence with self care tasks. Short Term Goal 3: Pt will scan to L side of body/environment with moderate cuing to increase awareness as needed for ADL completion. Short Term Goal 4: Pt will tolerate further assessment of transfers/functional mobility by OTR when appropriate. Following session, patient left in safe position with all fall risk precautions in place.

## 2022-12-17 NOTE — PLAN OF CARE
Problem: Discharge Planning  Goal: Discharge to home or other facility with appropriate resources  Outcome: Not Progressing  Flowsheets (Taken 12/17/2022 1117)  Discharge to home or other facility with appropriate resources:   Arrange for needed discharge resources and transportation as appropriate   Identify barriers to discharge with patient and caregiver     Problem: Neurosensory - Adult  Goal: Achieves stable or improved neurological status  Outcome: Not Progressing  Flowsheets (Taken 12/17/2022 1117)  Achieves stable or improved neurological status:   Initiate measures to prevent increased intracranial pressure   Maintain blood pressure and fluid volume within ordered parameters to optimize cerebral perfusion and minimize risk of hemorrhage  Goal: Achieves maximal functionality and self care  Outcome: Not Progressing  Flowsheets (Taken 12/17/2022 1117)  Achieves maximal functionality and self care:   Encourage and assist patient to increase activity and self care with guidance from physical therapy/occupational therapy   Monitor swallowing and airway patency with patient fatigue and changes in neurological status     Problem: Pain  Goal: Verbalizes/displays adequate comfort level or baseline comfort level  Outcome: Not Progressing  Flowsheets (Taken 12/17/2022 1117)  Verbalizes/displays adequate comfort level or baseline comfort level:   Encourage patient to monitor pain and request assistance   Assess pain using appropriate pain scale     Problem: Safety - Adult  Goal: Free from fall injury  Outcome: Not Progressing  Flowsheets (Taken 12/17/2022 1117)  Free From Fall Injury: Instruct family/caregiver on patient safety     Problem: ABCDS Injury Assessment  Goal: Absence of physical injury  Outcome: Not Progressing     Problem: Respiratory - Adult  Goal: Achieves optimal ventilation and oxygenation  Outcome: Not Progressing  Flowsheets (Taken 12/17/2022 1117)  Achieves optimal ventilation and oxygenation: Assess for changes in mentation and behavior   Position to facilitate oxygenation and minimize respiratory effort     Problem: Cardiovascular - Adult  Goal: Maintains optimal cardiac output and hemodynamic stability  Outcome: Not Progressing  Flowsheets (Taken 12/17/2022 1117)  Maintains optimal cardiac output and hemodynamic stability:   Assess for signs of decreased cardiac output   Monitor urine output and notify Licensed Independent Practitioner for values outside of normal range     Problem: Skin/Tissue Integrity - Adult  Goal: Skin integrity remains intact  Outcome: Not Progressing  Flowsheets (Taken 12/17/2022 1117)  Skin Integrity Remains Intact: Assess vascular access sites hourly  Goal: Oral mucous membranes remain intact  Outcome: Not Progressing  Flowsheets (Taken 12/16/2022 2011 by Aisha Ferraro RN)  Oral Mucous Membranes Remain Intact: Assess oral mucosa and hygiene practices     Problem: Gastrointestinal - Adult  Goal: Maintains adequate nutritional intake  Outcome: Not Progressing  Flowsheets (Taken 12/17/2022 1117)  Maintains adequate nutritional intake:   Monitor percentage of each meal consumed   Assist with meals as needed     Problem: Metabolic/Fluid and Electrolytes - Adult  Goal: Glucose maintained within prescribed range  Outcome: Not Progressing  Flowsheets (Taken 12/17/2022 1117)  Glucose maintained within prescribed range:   Assess for signs and symptoms of hyperglycemia and hypoglycemia   Administer ordered medications to maintain glucose within target range   Care plan reviewed with patient. Patient verbalizes understanding of the plan of care and contributed to goal setting.

## 2022-12-17 NOTE — PLAN OF CARE
Problem: Discharge Planning  Goal: Discharge to home or other facility with appropriate resources  12/17/2022 1155 by Gilma Melara RN  Outcome: Completed  12/17/2022 1117 by Gilma Melara RN  Outcome: Not Progressing  Flowsheets (Taken 12/17/2022 1117)  Discharge to home or other facility with appropriate resources:   Arrange for needed discharge resources and transportation as appropriate   Identify barriers to discharge with patient and caregiver     Problem: Neurosensory - Adult  Goal: Achieves stable or improved neurological status  12/17/2022 1155 by Gilma Melara RN  Outcome: Completed  12/17/2022 1117 by Gilma Melara RN  Outcome: Not Progressing  Flowsheets (Taken 12/17/2022 1117)  Achieves stable or improved neurological status:   Initiate measures to prevent increased intracranial pressure   Maintain blood pressure and fluid volume within ordered parameters to optimize cerebral perfusion and minimize risk of hemorrhage  Goal: Achieves maximal functionality and self care  12/17/2022 1155 by Gilma Melara RN  Outcome: Completed  12/17/2022 1117 by Gilma Melara RN  Outcome: Not Progressing  Flowsheets (Taken 12/17/2022 1117)  Achieves maximal functionality and self care:   Encourage and assist patient to increase activity and self care with guidance from physical therapy/occupational therapy   Monitor swallowing and airway patency with patient fatigue and changes in neurological status     Problem: Pain  Goal: Verbalizes/displays adequate comfort level or baseline comfort level  12/17/2022 1155 by Gilma Melara RN  Outcome: Completed  12/17/2022 1117 by Gilma Melara RN  Outcome: Not Progressing  Flowsheets (Taken 12/17/2022 1117)  Verbalizes/displays adequate comfort level or baseline comfort level:   Encourage patient to monitor pain and request assistance   Assess pain using appropriate pain scale     Problem: Safety - Adult  Goal: Free from fall injury  12/17/2022 1155 by Gilma Melara RN  Outcome: Completed  12/17/2022 1117 by Naren Odell RN  Outcome: Not Progressing  Flowsheets (Taken 12/17/2022 1117)  Free From Fall Injury: Instruct family/caregiver on patient safety     Problem: ABCDS Injury Assessment  Goal: Absence of physical injury  12/17/2022 1155 by Naren Odell RN  Outcome: Completed  12/17/2022 1117 by Naren Odell RN  Outcome: Not Progressing     Problem: Respiratory - Adult  Goal: Achieves optimal ventilation and oxygenation  12/17/2022 1155 by Naren Odell RN  Outcome: Completed  12/17/2022 1117 by Naren Odell RN  Outcome: Not Progressing  Flowsheets (Taken 12/17/2022 1117)  Achieves optimal ventilation and oxygenation:   Assess for changes in mentation and behavior   Position to facilitate oxygenation and minimize respiratory effort     Problem: Cardiovascular - Adult  Goal: Maintains optimal cardiac output and hemodynamic stability  12/17/2022 1155 by Naren Odell RN  Outcome: Completed  12/17/2022 1117 by Naren Odell RN  Outcome: Not Progressing  Flowsheets (Taken 12/17/2022 1117)  Maintains optimal cardiac output and hemodynamic stability:   Assess for signs of decreased cardiac output   Monitor urine output and notify Licensed Independent Practitioner for values outside of normal range     Problem: Skin/Tissue Integrity - Adult  Goal: Skin integrity remains intact  12/17/2022 1155 by Naren Odell RN  Outcome: Completed  12/17/2022 1117 by Naren Odell RN  Outcome: Not Progressing  Flowsheets (Taken 12/17/2022 1117)  Skin Integrity Remains Intact: Assess vascular access sites hourly  Goal: Oral mucous membranes remain intact  12/17/2022 1155 by Naren Odell RN  Outcome: Completed  12/17/2022 1117 by Naren Odell RN  Outcome: Not Progressing  Flowsheets (Taken 12/16/2022 2011 by Barbara Chand RN)  Oral Mucous Membranes Remain Intact: Assess oral mucosa and hygiene practices     Problem: Gastrointestinal - Adult  Goal: Maintains adequate nutritional intake  12/17/2022 1155 by Naren Odell RN  Outcome: Completed  12/17/2022 1117 by Mayela Zapaat RN  Outcome: Not Progressing  Flowsheets (Taken 12/17/2022 1117)  Maintains adequate nutritional intake:   Monitor percentage of each meal consumed   Assist with meals as needed     Problem: Metabolic/Fluid and Electrolytes - Adult  Goal: Glucose maintained within prescribed range  12/17/2022 1155 by Mayela Zapata RN  Outcome: Completed  12/17/2022 1117 by Mayela Zapata RN  Outcome: Not Progressing  Flowsheets (Taken 12/17/2022 1117)  Glucose maintained within prescribed range:   Assess for signs and symptoms of hyperglycemia and hypoglycemia   Administer ordered medications to maintain glucose within target range     Problem: Discharge Planning  Goal: Discharge to home or other facility with appropriate resources  12/17/2022 1155 by Mayela Zapata RN  Outcome: Completed  12/17/2022 1117 by Mayela Zapata RN  Outcome: Not Progressing  Flowsheets (Taken 12/17/2022 1117)  Discharge to home or other facility with appropriate resources:   Arrange for needed discharge resources and transportation as appropriate   Identify barriers to discharge with patient and caregiver     Problem: Neurosensory - Adult  Goal: Achieves stable or improved neurological status  12/17/2022 1155 by Mayela Zapata RN  Outcome: Completed  12/17/2022 1117 by Mayela Zapata RN  Outcome: Not Progressing  Flowsheets (Taken 12/17/2022 1117)  Achieves stable or improved neurological status:   Initiate measures to prevent increased intracranial pressure   Maintain blood pressure and fluid volume within ordered parameters to optimize cerebral perfusion and minimize risk of hemorrhage  Goal: Achieves maximal functionality and self care  12/17/2022 1155 by Mayela Zapata RN  Outcome: Completed  12/17/2022 1117 by Mayela Zapata RN  Outcome: Not Progressing  Flowsheets (Taken 12/17/2022 1117)  Achieves maximal functionality and self care:   Encourage and assist patient to increase activity and self care with guidance from physical therapy/occupational therapy   Monitor swallowing and airway patency with patient fatigue and changes in neurological status     Problem: Pain  Goal: Verbalizes/displays adequate comfort level or baseline comfort level  12/17/2022 1155 by Stephy Avelar RN  Outcome: Completed  12/17/2022 1117 by Stephy Avelar RN  Outcome: Not Progressing  Flowsheets (Taken 12/17/2022 1117)  Verbalizes/displays adequate comfort level or baseline comfort level:   Encourage patient to monitor pain and request assistance   Assess pain using appropriate pain scale     Problem: Safety - Adult  Goal: Free from fall injury  12/17/2022 1155 by Stephy Avelar RN  Outcome: Completed  12/17/2022 1117 by Stephy Avelar RN  Outcome: Not Progressing  Flowsheets (Taken 12/17/2022 1117)  Free From Fall Injury: Instruct family/caregiver on patient safety     Problem: ABCDS Injury Assessment  Goal: Absence of physical injury  12/17/2022 1155 by Stephy Avelar RN  Outcome: Completed  12/17/2022 1117 by Stephy Avelar RN  Outcome: Not Progressing     Problem: Respiratory - Adult  Goal: Achieves optimal ventilation and oxygenation  12/17/2022 1155 by Stephy Avelar RN  Outcome: Completed  12/17/2022 1117 by Stephy Avelar RN  Outcome: Not Progressing  Flowsheets (Taken 12/17/2022 1117)  Achieves optimal ventilation and oxygenation:   Assess for changes in mentation and behavior   Position to facilitate oxygenation and minimize respiratory effort     Problem: Cardiovascular - Adult  Goal: Maintains optimal cardiac output and hemodynamic stability  12/17/2022 1155 by Stephy Avelar RN  Outcome: Completed  12/17/2022 1117 by Stephy Avelar RN  Outcome: Not Progressing  Flowsheets (Taken 12/17/2022 1117)  Maintains optimal cardiac output and hemodynamic stability:   Assess for signs of decreased cardiac output   Monitor urine output and notify Licensed Independent Practitioner for values outside of normal range     Problem: Skin/Tissue Integrity - Adult  Goal: Skin integrity remains intact  12/17/2022 1155 by Edward Enriquez RN  Outcome: Completed  12/17/2022 1117 by Edward Enriquez RN  Outcome: Not Progressing  Flowsheets (Taken 12/17/2022 1117)  Skin Integrity Remains Intact: Assess vascular access sites hourly  Goal: Oral mucous membranes remain intact  12/17/2022 1155 by Edward Enriquez RN  Outcome: Completed  12/17/2022 1117 by Edward Enriquez RN  Outcome: Not Progressing  Flowsheets (Taken 12/16/2022 2011 by Susi Charles RN)  Oral Mucous Membranes Remain Intact: Assess oral mucosa and hygiene practices     Problem: Gastrointestinal - Adult  Goal: Maintains adequate nutritional intake  12/17/2022 1155 by Edward Enriquez RN  Outcome: Completed  12/17/2022 1117 by Edward Enriquez RN  Outcome: Not Progressing  Flowsheets (Taken 12/17/2022 1117)  Maintains adequate nutritional intake:   Monitor percentage of each meal consumed   Assist with meals as needed     Problem: Metabolic/Fluid and Electrolytes - Adult  Goal: Glucose maintained within prescribed range  12/17/2022 1155 by Edward Enriquez RN  Outcome: Completed  12/17/2022 1117 by Edward Enriquez RN  Outcome: Not Progressing  Flowsheets (Taken 12/17/2022 1117)  Glucose maintained within prescribed range:   Assess for signs and symptoms of hyperglycemia and hypoglycemia   Administer ordered medications to maintain glucose within target range

## 2022-12-17 NOTE — PROGRESS NOTES
This RN spoke with Geneva Rock at [facility] to give report. Fax will be sent to the number she provided. # [phone]. A paper copy of last dose MAR, AILYN and discharge papers sent with transport. No other concerns voiced at this time.

## 2022-12-17 NOTE — PROGRESS NOTES
[facility]  INPATIENT SPEECH THERAPY  [facility] 4A  DAILY NOTE    TIME   SLP Individual Minutes  Time In: 3773  Time Out: 3633  Minutes: 14  Timed Code Treatment Minutes: 0 Minutes      Date: 2022  Patient Name: David Johnson      CSN: [id number]   : 1960  (65 y.o.)  Gender: female   Referring Physician: Brooke Barragan PA-C  Diagnosis: Acute CVA   Precautions: Fall risk, aspiration precautions  Current Diet: Puree diet with mildly thick liquids   Swallowing Strategies: Full Upright Position, Small Bite/Sip, No Straw, Multiple Swallow, Pulmonary Monitoring, Oral Care after all Meals, Medications Crushed with Puree, Direct 1:1 Supervision, Alternate Solids and Liquids, Limit Distractions, and Monitor for Fatigue              Date of Last MBS/FEES: MBS: 2022    Pain:  No pain reported. Subjective:  PADMINI Fernandez with approval of dysphagia session this date. Upon arrival to room, patient awake in bed and agreeable to po trials. Adequate alertness and engagement throughout. Short-Term Goals:  SHORT TERM GOAL #1:  Goal 1: Patient will safely consume puree diet, mildly thick liquids (cup intake for fluids; advanced textures as clinically indicated) and engage in oropharyngeal strengthening+thermal gustatory program to improve oral phase weakness, airway protection, and timeliness for pharyngeal onset trigger. INTERVENTIONS: ST completed PO trials of puree and mildly thick liquids via cup this date. Patient with evidence of decrease labial strength resulting in occasional min anterior escape. Patient with suspected decreased bolus control and complete bolus clearance across all trials this date. Patient with suspected consistent swallow initiation this date; however, concerns for delayed swallow initiation. No overt s/s of aspiration; however, patient intermittently with increased work of breathing across trials.  Patient continues to be at heightened risk for pulmonary compromise as it r/t swallow function. Recommendations to continue a conservative diet of puree solids with mildly thick liquids, STRICT adherence to identified compensatory strategies. Ongoing dysphagia interventions to be prioritized. *post session, patient without respiratory distress upon leaving room    SHORT TERM GOAL #2:  Goal 2: Patient will engage in structured speech drills/tasks (DDK rates, multi-syllabic word repetition, core functional phrases) with implementation of SOS speech strategies to improve intelligibility to 50% in known contexts to optimize speech production skills for listener comprehension. INTERVENTIONS: Did not address this session d/t focus on other goals. SHORT TERM GOAL #3:  Goal 3: Patient will complete functional immediate/delayed recall tasks with 70% accuracy, mod cues to improve retention of pertinent information. INTERVENTIONS: Did not address this session d/t focus on other goals. SHORT TERM GOAL #4:  Goal 4: Patient will complete problem solving, verbal/visual reasoning, and executive functioning tasks (time, money/math/finances, medications) with 70% accuracy, mod cues to improve contributions within ADL/IADL requirements. INTERVENTIONS: Did not address this session d/t focus on other goals. SHORT TERM GOAL #5:  Goal 5: Patient will complete sequencing and thought organization tasks with 70% accuracy, mod cues to improve mental flexibility for daily living scenarios. INTERVENTIONS: Did not address this session d/t focus on other goals.      Long-Term Goals:   No LTG established given short ELOS      EDUCATION:  Learner: Patient  Education:  Reviewed diet and strategies, Reviewed signs, symptoms and risks of aspiration, Reviewed ST goals and Plan of Care, Reviewed recommendations for follow-up, and Education Related to Potential Risks and Complications Due to Impairment/Illness/Injury  Evaluation of Education: Demonstrates with assistance, Needs further instruction, and Family not present    ASSESSMENT/PLAN:  Activity Tolerance:  Patient tolerance of  treatment: fair. Assessment/Plan: Patient progressing toward established goals. Continues to require skilled care of licensed speech pathologist to progress toward achievement of established goals and plan of care. .     Plan for Next Session: dysphagia management + Language/Cognitive tx  Discharge Recommendations: SNF    [facility]) [address]

## 2022-12-17 NOTE — PROGRESS NOTES
Pt is a 63y.o. female, propped up in bed watching television, in 4A-07. Gerardo Mott is recovering from a CVA, with communication significantly impaired-thus our visit was short. She did mention that she is headed to [facility] and it's a good place.  provided active listening, encouragement and prayer-met with gratitude by Gerardo Mott.     12/17/22 1606   Encounter Summary   Encounter Overview/Reason  Spiritual/Emotional Needs   Service Provided For: Patient and family together   Referral/Consult From: [facility] Family members   Last Encounter  12/17/22   Complexity of Encounter Low   Begin Time 1415   End Time  1420   Total Time Calculated 5 min   Assessment/Intervention/Outcome   Assessment Anxious; Compromised coping; Hopeful;Impaired social interaction   Intervention Active listening;Prayer (assurance of)/Sunshine;Nurtured Hope   Outcome Receptive; Expressed Gratitude

## 2022-12-22 NOTE — DISCHARGE SUMMARY
Hospitalist Discharge Summary        Patient: Darío Bey  YOB: 1960  MRN: [id number]   Acct: [de-identified]    Primary Care Physician: Rose Marie Bhat DO    Admit date  12/12/2022    Discharge date:  12/17/2022 3:50 PM    Chief Complaint on presentation : Presented as a stroke alert    Admission HPI:  Per original HPI:  Jimenez Ragland is a 65 y.o. female everyday smoker with a PMHx of NIDDM 2, HLD, HTN, GERD, depression, anxiety, asthma, and prior CVA in 2010 with residual left-sided weakness and dysarthria. She presented to [facility] emergency department as a stroke alert with a last known well time on 12/12/2022 at 9:25 AM. She presented with an NIH score of 6. CT of the head was negative for acute intracranial abnormalities. CTA of the head/neck revealed bilateral ICA occlusions. The patient was consulted by neuro interventional and was subsequently administered TNK. She was admitted to the ICU for further observation post administration. Of note the patient had recently been evaluated here at [facility] on 11/22/2022 with complaint of dizziness. CT head at that time without acute findings and the patient was discharged home. She later presented to [facility] on 11/24/2022 for similar complaints and transferred to [facility]. At this time she was evaluated by interventional neurology where she was admitted from 11/24/22-12/2/2022 for similar symptoms. MRI brain at that time had evidence of large acute to subacute stroke of watershed regions and right occipital lobe. Diagnostic cerebral angiogram completed on 11/28/2022 revealed complete occlusion of the right ICA, left ICA, and right PCA. There was no stent intervention at that time. Patient's brother provided majority of the patient's history and stated that this started all before Thanksgiving of this year.  He stated that she had prior strokes but with minimal deficit except some cognitive impairment and was living independently prior to Thanksgiving. The patient was discharged to SNF with ASA, Plavix, high intensity statin. [facility] KAUR course:    During admission the following problems were addressed:    Ischemic stroke, recurrent on plavix, in both hemispheres. Hx of past CVA in 2010 and NOV2022. Had been recovering from most recent CVA in SNF with significantly worsened symptoms [id number] while on plavix. NIHSS 6 on arrival. Received TNK and admitted to ICU for 24h. MRI positive for acute infarcts to right frontoparietal junction, posterior right frontal region, posterior right temporal region, anterior-inferior occipital region, and a subacute infarct in the right occipital region. CTA noted bilateral ICA occlusions. No definitive intervention planned as risks of intervention unfortunately outweigh benefits. Echo grossly unremarkable. P2Y12 inhibitor test shows resistance to plavix. Palliative consulted, discussed goals of care with patient and brother present. Code status changed to Limited x 4 per patient wishes and family agreement. Neurology following  Continue brilinta 90mg BID + ASA 81mg daily. Recommend smoking cessation  Continue statin  Cleared by neurology to maximize antihypertensive regimen. Vascular, Dr. Adri Thakur, consulted:  No indication for endarterectomy. Palliative following. Continue PT/OT. Will require aggressive rehab  Pending precertification back to SNF  ?UTI. Patient initially unable to confirm/deny symptoms d/t communication barrier caused by CVAs, but does not remember whether she had any symptoms of UTI prior to admit. UA shows E. coli, colony count >100K. Suprapubic tenderness to palpation. Finish course of ceftriaxone  Acute hypoxic respiratory failure. Remains on 2L vis NC. Likely secondary to CVA. No home O2 needs prior to most recent strokes. Continue supplemental O2, titrate to >90%, wean as tolerated  NIDDM2. On metformin outpatient, currently holding.    LD SSI in place, however has not needed insulin during this admit. Continue to monitor with daily BMP  Dysarthria/dysphagia. Secondary to CVAs. Modified barium study [id number] showed laryngeal penetration of thin barium without evidence of aspiration. Poor PO intake on current diet. Start gentle IVFs  Diet per SLP recommendation  Currently recommends pureed diet, mildly thick liquids with intake via cup, and 1-to-1 assistance/supervision during meals. HTN, essential. Was on amlodipine-benazepril 10-20 outpatient. Was not taking atenolol at time of admit. Home meds restarted [id number]. Per neuro, cleared for optimization of BP. Initially on amlodipine 10mg daily and lisinopril 20mg daily. Lisinopril increased to 40mg daily and hydralazine 50mg TID started [id number]. Ongoing pressures to 202/90 overnight. Started on carvedilol 12.5 BID [id number], appears to be tolerating well: HR 79, /71. Continue current regimen  Continue to closely monitor BP  Adjust meds as needed to achieve BP goal  Depression with anxiety. Has been on buspar, lexapro, and atarax outpatient in the past, however had only been on lexapro at time of admit. Continue home lexapro and atarax. HLD. Continue high dose statin. GERD. Resume prevacid  Obesity. BMI 34.76. Noted. Hospital Course:      [id number] - Ongoing blood pressure management required. [id number] - Evaluated by vascular surgeon, Dr. Ya Boudreaux, who found no indication for vascular intervention. [id number] - Palliative care met with patient and family to discuss goals of care in setting of her multiple bihemispheric acute strokes over the past month with no definitive intervention possible by neurology or vascular surgery. Per patient wishes, code status updated to Limited x 4. Pending discharge back to SNF. Concern that there will be further strokes. Will require aggressive rehab.         Physical Exam:  Vitals: 36.7, 140/61, 56, 16, 100% on RA  Weight:   Weight: 212 lb 3.2 oz (96.3 kg) General: chronically ill appearing  female, lying in bed, eyes closed, but easily rousable and interactive. Follows commands. Eyes:  Nonicteric, no conjunctivitis, EOMs appear grossly intact, improved over prior exams. HENT: Normocephalic, atraumatic. Nares normal. Oral mucosa dry. Hearing intact. Neck: Supple, with full range of motion. No gross JVD appreciated. Respiratory:  No extra work of breathing. Clear to auscultation, without rales or wheezes or rhonchi. Cardiovascular: Normal rate, regular rhythm with normal U1/X5 with systolic murmur. Bilateral lower extremity edema. Abdomen: Soft, non-tender, non-distended with normal bowel sounds. Musculoskeletal: No joint swelling or tenderness. Normal tone. No abnormal movements. Skin: Warm and dry. No rashes or lesions. Neurologic:  Alert, follows commands, severely dysarthric. Improved left lower extremity strength over prior exam, ongoing severe left upper extremity weakness. Discharge Medications:     Medication List        START taking these medications      amLODIPine 10 MG tablet  Commonly known as: NORVASC  Take 1 tablet by mouth daily     busPIRone 15 MG tablet  Commonly known as: BUSPAR  TAKE ONE TABLET BY MOUTH TWICE DAILY @ 9AM & 5PM     carvedilol 12.5 MG tablet  Commonly known as: COREG  Take 1 tablet by mouth 2 times daily (with meals)     hydrALAZINE 25 MG tablet  Commonly known as: APRESOLINE  Take 1 tablet by mouth every 8 hours     lansoprazole 30 MG disintegrating tablet  Commonly known as: PREVACID SOLUTAB     lisinopril 40 MG tablet  Commonly known as: PRINIVIL;ZESTRIL  Take 1 tablet by mouth daily     ticagrelor 90 MG Tabs tablet  Commonly known as: BRILINTA  Take 1 tablet by mouth 2 times daily            CHANGE how you take these medications      aspirin 81 MG EC tablet  What changed: Another medication with the same name was removed. Continue taking this medication, and follow the directions you see here. atorvastatin 80 MG tablet  Commonly known as: LIPITOR  What changed: Another medication with the same name was removed.  Continue taking this medication, and follow the directions you see here.     hydrOXYzine HCl 25 MG tablet  Commonly known as: ATARAX  Take 1 tablet by mouth every 6 hours as needed for Anxiety  What changed: when to take this            CONTINUE taking these medications      Blood Glucose Monitoring Suppl Kit  Use As Directed     blood glucose test strips  Use to check sugars twice daily     escitalopram 10 MG tablet  Commonly known as: Lexapro  Take 1 tablet by mouth daily     gabapentin 100 MG capsule  Commonly known as: NEURONTIN  take 1 capsule by mouth three times a day     Lancets Misc  Use to check sugars twice daily     metFORMIN 1000 MG tablet  Commonly known as: GLUCOPHAGE  take 1 tablet by mouth twice a day with meals     omega-3 acid ethyl esters 1 g capsule  Commonly known as: LOVAZA     tiZANidine 4 MG tablet  Commonly known as: [place]  take 1 tablet by mouth every 8 hours if needed for muscle spasm     Walker Misc  Wheeled walker with a seat (Rollator)            STOP taking these medications      amLODIPine-benazepril 10-20 MG per capsule  Commonly known as: LOTREL     atenolol 25 MG tablet  Commonly known as: TENORMIN     clopidogrel 75 MG tablet  Commonly known as: PLAVIX     Vitamin D3 20 MCG (800 UNIT) Tabs            ASK your doctor about these medications      albuterol sulfate  (90 Base) MCG/ACT inhaler  Commonly known as: ProAir HFA  Inhale 2 puffs into the lungs every 4 hours as needed for Wheezing or Shortness of Breath     fluticasone 110 MCG/ACT inhaler  Commonly known as: FLOVENT HFA  Inhale 1 puff into the lungs 2 times daily     oxybutynin 10 MG extended release tablet  Commonly known as: DITROPAN-XL  TAKE 1 TABLET BY MOUTH ONCE DAILY     vitamin D 25 MCG (1000 UT) Tabs tablet  Commonly known as: CHOLECALCIFEROL               Where to Get Your Medications These medications were sent to [address] [place] [address] #[id number] - [facility], [address] [phone] - F [phone]  [address] [place] [address], [facility]      Phone: [phone]   amLODIPine 10 MG tablet  carvedilol 12.5 MG tablet  hydrALAZINE 25 MG tablet  hydrOXYzine HCl 25 MG tablet  lisinopril 40 MG tablet  ticagrelor 90 MG Tabs tablet          Labs :  No results found for this or any previous visit (from the past 72 hour(s)). Microbiology:    Blood culture #1: No results found for: BC    Blood culture #2:No results found for: BLOODCULT2    Organism:  No results found for: LABGRAM    MRSA culture only:No results found for: [facility]    Urine culture:   Lab Results   Component Value Date/Time    LABURIN  10/17/2019 11:45 AM     Mixed growth. The mixture of organisms present represents both organisms that may cause urinary tract infections and organisms that are not a common cause of urinary tract infections and are possibly skin brenda or distal urethral brenda. Lab Results   Component Value Date/Time    ORG Escherichia coli 12/13/2022 01:50 AM        Respiratory culture: No results found for: CULTRESP    Aerobic and Anaerobic :  Lab Results   Component Value Date/Time    LABAERO No Acinetobacter species isolated. 12/12/2022 03:00 PM     No results found for: LABANAE    Urinalysis:      Lab Results   Component Value Date/Time    NITRU POSITIVE 12/13/2022 01:50 AM    WBCUA 15-25 12/13/2022 01:50 AM    BACTERIA MANY 12/13/2022 01:50 AM    RBCUA 0-2 12/13/2022 01:50 AM    BLOODU TRACE 12/13/2022 01:50 AM    SPECGRAV 1.022 10/17/2019 11:45 AM    SPECGRAV >=1.030 10/17/2019 10:50 AM    GLUCOSEU NEGATIVE 12/13/2022 01:50 AM       Radiology:  CTA HEAD W WO CONTRAST (CODE STROKE)    Result Date: 12/12/2022  PROCEDURE: CTA HEAD W WO CONTRAST, CTA NECK W WO CONTRAST CLINICAL INFORMATION: [address] [facility]. Right-sided weakness. Old infarcts on the left. COMPARISON: Head CT 12/12/2022 and 11/22/2022. TECHNIQUE: 1 mm axial images were obtained through the head and neck after the fast bolus administration of contrast. A noncontrast localizer was obtained. 3-D reconstructions were performed on a dedicated 3-D workstation. These include multiplanar MPR images and multiplanar MIP images. Centerline reconstructions were obtained of the carotid systems. Isovue intravenous contrast was given. The [employer] application was used in analyzing the CTA head. An image was sent to PACS. All CT scans at this facility use dose modulation, iterative reconstruction, and/or weight-based dosing when appropriate to reduce radiation dose to as low as reasonably achievable. FINDINGS: CTA NECK: Aortic arch and branches: There is calcified atheromatosis of the aortic arch. There is no stenosis at the origin innominate artery, left common carotid artery or the left subclavian artery. There is a possible proximal occlusion of the right subclavian artery. This is obscured by artifact from incoming contrast. Right common carotid artery/ICA: There is some calcified atherosclerosis of the right common carotid artery. There is heavy atherosclerosis of the right carotid bulb. The origin of the right external carotid artery is patent. The right internal carotid artery is occluded throughout its cervical segment. Left common carotid artery/ICA: The left common carotid artery is normal. The origin of the left external carotid artery is affected by atherosclerosis. There is no gross stenosis. The proximal left carotid bulb is patent. There is been an abrupt cut off. The distal left carotid bulb is occluded. The left internal carotid artery is occluded. Vertebral arteries: The right vertebral artery is small. This is difficult to follow due to artifact from incoming contrast. The proximal segment is not seen. Distally this is a very small vessel. The left vertebral artery is the dominant vessel. This is  normal throughout.  CTA HEAD: Internal carotid arteries: The right internal carotid artery reconstitutes at the level skull base. This is due to collateral flow. There is scattered calcified atheromatosis. There is no stenosis of the cavernous segment. The ophthalmic artery origin is  patent. The left internal carotid artery is diminutive. This is a small vessel there is severe stenosis in its cavernous segment. The ophthalmic artery on the left is patent. Middle cerebral arteries: On the right the MCA and its proximal branches are normal. On the left, the MCA is a small vessel. The M1 and M2 branches are patent. Anterior cerebral arteries: There is a normal right A1 segment of the anterior cerebral artery. A left A1 segment is not identified. Only a single A2 segment is identified. Vertebral arteries: The distal right vertebral artery is diminutive. There is a normal dominant left vertebral artery. Basilar artery: Normal. Superior cerebellar arteries: Normal. Posterior cerebral arteries: There is a normal large left posterior cerebral artery. There is a normal left posterior communicating artery. There is a small right posterior communicating artery. There is a aplastic right P1 segment. No aneurysms are identified. The superior sagittal sinus, vein of Paresh, internal cerebral veins, straight sinus, transverse sinuses and sigmoid sinuses are patent. Axial source data: There are no suspicious findings the lung apices. There is no upper mediastinal adenopathy. There is a small vague nodule in the right thyroid lobe measuring 1 cm. There is no cervical adenopathy. In the brain, there are old infarcts in both occipital lobes and the left frontal and parietal lobes. 1. Occluded cervical left internal carotid artery. There is an abrupt cut off. This may be an acute occlusion. This cavernous sinus segment is diminutive. 2. Occluded right internal carotid artery. There is a normal cavernous sinus segment.  3. Absent A1 and A2 segments of the left anterior cerebral artery. 4. Patent but small left middle cerebral artery and proximal branches. 5. Possible occlusion of the right subclavian artery at its origin. This is not well seen. The proximal right vertebral artery is not seen. This may also be occluded. These findings were telephoned to Ivon High MD at 11:48 AM on 12/12/2022. Kali Silva **This report has been created using voice recognition software. It may contain minor errors which are inherent in voice recognition technology. ** Final report electronically signed by Dr. Paramjit Paige on 12/12/2022 11:50 AM    CT HEAD WO CONTRAST    Result Date: 12/13/2022   ADDENDUM #1 This report was discussed with Joesph Ellis RN on Dec 13, 2022 01:47:00 EST. This document has been electronically signed by: Sandi Starks on 12/13/2022 01:47 AM  ORIGINAL REPORT  CT head without contrast Comparison: CT/SR - CT HEAD WO CONTRAST - 12/12/2022 10:54 AM EST Findings: There is a new area of right parietotemporal loss of gray-white differentiation. Old multifocal infarcts are unchanged otherwise. Mild atrophy-like change. No midline shift, hydrocephalus, or acute hemorrhage. Orbits, sinuses, and mastoids unremarkable. No fracture. Impression: 1. Developing subacute right parietotemporal infarct. This document has been electronically signed by: Connie Mohamud MD on 12/13/2022 01:37 AM All CTs at this facility use dose modulation techniques and iterative reconstructions, and/or weight-based dosing when appropriate to reduce radiation to a low as reasonably achievable. CT HEAD WO CONTRAST    Result Date: 12/12/2022  PROCEDURE: CT HEAD WO CONTRAST CLINICAL INFORMATION: STROKE ALERT. Right-sided weakness. COMPARISON: Head CT 11/22/2022. TECHNIQUE: Noncontrast 5 mm axial images were obtained through the brain. Sagittal and coronal reconstructions were obtained.  All CT scans at this facility use dose modulation, iterative reconstruction, and/or weight-based dosing when appropriate to reduce radiation dose to as low as reasonably achievable. FINDINGS: There is no hemorrhage. There are no intra-or extra-axial collections. There is no hydrocephalus, midline shift or mass effect. There are stable old infarcts in the left frontal lobe and the left parieto-occipital lobe. There is also an old appearing infarct in the right occipital lobe. There is an old infarct in the right medial basal ganglia. There is a moderate amount of abnormal low attenuation in the white matter the brain suggesting chronic small vessel ischemic changes. The paranasal sinuses and mastoid air cells are normally aerated. There is no suspicious calvarial abnormality. 1. No acute findings. 2. Stable appearing old infarcts in the left frontal lobe, both occipital lobes and a portion of the left parietal lobe. 3. Small old infarct in the medial aspect of the right basal ganglia. These findings were telephoned to Ciera Pickering at 11:02 AM on 12/12/2022 . **This report has been created using voice recognition software. It may contain minor errors which are inherent in voice recognition technology. ** Final report electronically signed by Dr. Yuniel Perez on 12/12/2022 11:06 AM    CTA NECK W WO CONTRAST    Result Date: 12/12/2022  PROCEDURE: CTA HEAD W WO CONTRAST, CTA NECK W WO CONTRAST CLINICAL INFORMATION: [address]. Right-sided weakness. Old infarcts on the left. COMPARISON: Head CT 12/12/2022 and 11/22/2022. TECHNIQUE: 1 mm axial images were obtained through the head and neck after the fast bolus administration of contrast. A noncontrast localizer was obtained. 3-D reconstructions were performed on a dedicated 3-D workstation. These include multiplanar MPR images and multiplanar MIP images. Centerline reconstructions were obtained of the carotid systems. Isovue intravenous contrast was given. The [employer] application was used in analyzing the CTA head. An image was sent to PACS.  All CT scans at this facility use dose modulation, iterative reconstruction, and/or weight-based dosing when appropriate to reduce radiation dose to as low as reasonably achievable. FINDINGS: CTA NECK: Aortic arch and branches: There is calcified atheromatosis of the aortic arch. There is no stenosis at the origin innominate artery, left common carotid artery or the left subclavian artery. There is a possible proximal occlusion of the right subclavian artery. This is obscured by artifact from incoming contrast. Right common carotid artery/ICA: There is some calcified atherosclerosis of the right common carotid artery. There is heavy atherosclerosis of the right carotid bulb. The origin of the right external carotid artery is patent. The right internal carotid artery is occluded throughout its cervical segment. Left common carotid artery/ICA: The left common carotid artery is normal. The origin of the left external carotid artery is affected by atherosclerosis. There is no gross stenosis. The proximal left carotid bulb is patent. There is been an abrupt cut off. The distal left carotid bulb is occluded. The left internal carotid artery is occluded. Vertebral arteries: The right vertebral artery is small. This is difficult to follow due to artifact from incoming contrast. The proximal segment is not seen. Distally this is a very small vessel. The left vertebral artery is the dominant vessel. This is  normal throughout. CTA HEAD: Internal carotid arteries: The right internal carotid artery reconstitutes at the level skull base. This is due to collateral flow. There is scattered calcified atheromatosis. There is no stenosis of the cavernous segment. The ophthalmic artery origin is  patent. The left internal carotid artery is diminutive. This is a small vessel there is severe stenosis in its cavernous segment. The ophthalmic artery on the left is patent.  Middle cerebral arteries: On the right the MCA and its proximal branches are normal. On the left, the MCA is a small vessel. The M1 and M2 branches are patent. Anterior cerebral arteries: There is a normal right A1 segment of the anterior cerebral artery. A left A1 segment is not identified. Only a single A2 segment is identified. Vertebral arteries: The distal right vertebral artery is diminutive. There is a normal dominant left vertebral artery. Basilar artery: Normal. Superior cerebellar arteries: Normal. Posterior cerebral arteries: There is a normal large left posterior cerebral artery. There is a normal left posterior communicating artery. There is a small right posterior communicating artery. There is a aplastic right P1 segment. No aneurysms are identified. The superior sagittal sinus, vein of Paresh, internal cerebral veins, straight sinus, transverse sinuses and sigmoid sinuses are patent. Axial source data: There are no suspicious findings the lung apices. There is no upper mediastinal adenopathy. There is a small vague nodule in the right thyroid lobe measuring 1 cm. There is no cervical adenopathy. In the brain, there are old infarcts in both occipital lobes and the left frontal and parietal lobes. 1. Occluded cervical left internal carotid artery. There is an abrupt cut off. This may be an acute occlusion. This cavernous sinus segment is diminutive. 2. Occluded right internal carotid artery. There is a normal cavernous sinus segment. 3. Absent A1 and A2 segments of the left anterior cerebral artery. 4. Patent but small left middle cerebral artery and proximal branches. 5. Possible occlusion of the right subclavian artery at its origin. This is not well seen. The proximal right vertebral artery is not seen. This may also be occluded. These findings were telephoned to Ollie Kelly MD at 11:48 AM on 12/12/2022. Romel Kent **This report has been created using voice recognition software. It may contain minor errors which are inherent in voice recognition technology. ** Final report electronically signed by  Stefan Sid on 12/12/2022 11:50 AM    XR SHOULDER LEFT (MIN 2 VIEWS)    Result Date: 12/13/2022  PROCEDURE: XR SHOULDER LEFT (MIN 2 VIEWS) CLINICAL INFORMATION: recent fall, bruise to left shoulder, pain. COMPARISON: No prior study. TECHNIQUE: 4 views of the left shoulder. FINDINGS: The humeral head is normally located opposite the glenoid. No fracture is evident. There is mild hypertrophy of the distal clavicle. Acromioclavicular and coracoclavicular intervals are normal.      1. No evidence of acute osseous abnormality of the left shoulder. 2. Mild degenerative changes of the acromioclavicular joint. **This report has been created using voice recognition software. It may contain minor errors which are inherent in voice recognition technology. ** Final report electronically signed by Dr. Bhavana Lindsay MD on 12/13/2022 4:14 PM    XR CHEST PORTABLE    Result Date: 12/12/2022  PROCEDURE: XR CHEST PORTABLE CLINICAL INFORMATION: ams. COMPARISON: Chest x-ray dated 22nd of November 2022. TECHNIQUE: AP upright view of the chest. FINDINGS: There is borderline cardiomegaly. .The mediastinum is not widened. There is  atelectasis or scarring at the left lung base. The remaining lungs are clear. There are no effusions. . The pulmonary vascularity is normal. No suspicious osseous lesions are present. 1. Borderline cardiomegaly. 2. Atelectasis or scarring at left lung base. 3. Otherwise negative chest x-ray. . **This report has been created using voice recognition software. It may contain minor errors which are inherent in voice recognition technology. ** Final report electronically signed by DR Manuel Dwyer on 12/12/2022 12:37 PM    MRI brain without contrast    Result Date: 12/13/2022  PROCEDURE: MRI BRAIN WO CONTRAST CLINICAL INFORMATION 24 hours post-TNK administration (12/12/22 1108). COMPARISON: Head CT 12/13/2022.  TECHNIQUE: Multiplanar and multiple spin echo MRI images were obtained of the brain without contrast. FINDINGS: On the diffusion-weighted images, there is abnormal high signal on the right at the frontoparietal junction. This also extends into the more inferior aspects of the right frontal lobe. This involves portions of the posterior right temporal lobe and the anterior medial right occipital lobe. This also involves the insular cortex. There is corresponding low signal on ADC map. There is corresponding high signal on the FLAIR and T2-weighted sequences. This is consistent with a moderate-sized acute infarct. In the right occipital lobe, more superiorly, there is some high signal on the diffusion-weighted images and ADC map. This does not involve the cortex. In the cortex at this location, there is high signal on the T1-weighted images. This particular portion of the infarct may be subacute. Of note, this area did have low attenuation on the head CT. The brain volume is reduced. There is a moderate amount of abnormal signal in the white matter of the brain seen on the FLAIR and T2-weighted sequences. This is consistent with moderate severity chronic small vessel ischemic changes. There are old infarcts in the left frontal lobe and left parietal lobe. There is an old lacunar type infarct in the medial right basal ganglia. There are no intra-or extra-axial collections. There is no hydrocephalus or midline shift. There is evidence of old hemorrhage associated with the medial right basal ganglia. There is no evidence of susceptibility artifact associated with the acute infarcts on the right. There is some high signal on the T1-weighted images associated with the infarct in the right occipital lobe consistent with some laminar necrosis. This does not have associated susceptibility artifact. The internal carotid artery flow voids are abnormal at the skull base bilaterally.  The supraclinoid segments are normal. The midline craniocervical junction structures are normal.  The pituitary gland and brainstem are normal.      1. Moderate-sized acute infarct involving portions of the right frontoparietal junction, the posterior right frontal lobe, the posterior right temporal lobe and the anterior inferior right occipital lobe. 2. Subacute infarct involving the superior right occipital lobe. There is some associated laminar necrosis. 3. Old infarcts in the left frontal and parietal lobes. **This report has been created using voice recognition software. It may contain minor errors which are inherent in voice recognition technology. ** Final report electronically signed by Dr. Paramjit Paige on 12/13/2022 12:02 PM       Follow-up scheduled after discharge :  in the next few days with Kirill Ward DO      Consultations during this hospital stay:  [] NONE [] Cardiology  [] Nephrology  [] Hemo onco   [] GI   [] ID  [] Endocrine  [] Pulm    [x] Neuro    [] Psych   [] Urology  [] ENT   [] G SURGERY   []Ortho    [x]CV surg    [] Palliative  [] Hospice [] Pain management   []    []TCU   [x] PT/OT  OTHERS:    Disposition: SNF  Condition at Discharge: Stable    Time Spent:40 minutes    Electronically signed by Tami Jain MD on 12/17/22 at 3:50 PM EST   Discharging Hospitalist

## 2023-03-06 ENCOUNTER — FOLLOWUP TELEPHONE ENCOUNTER (OUTPATIENT)
Dept: NEUROLOGY | Age: 63
End: 2023-03-06

## 2023-03-28 ENCOUNTER — FOLLOWUP TELEPHONE ENCOUNTER (OUTPATIENT)
Dept: NEUROLOGY | Age: 63
End: 2023-03-28